# Patient Record
Sex: FEMALE | Race: BLACK OR AFRICAN AMERICAN | NOT HISPANIC OR LATINO | Employment: FULL TIME | URBAN - METROPOLITAN AREA
[De-identification: names, ages, dates, MRNs, and addresses within clinical notes are randomized per-mention and may not be internally consistent; named-entity substitution may affect disease eponyms.]

---

## 2017-02-09 DIAGNOSIS — I10 ESSENTIAL HYPERTENSION: ICD-10-CM

## 2017-02-09 RX ORDER — LISINOPRIL AND HYDROCHLOROTHIAZIDE 20; 25 MG/1; MG/1
TABLET ORAL
Qty: 90 TABLET | Refills: 0 | Status: SHIPPED | OUTPATIENT
Start: 2017-02-09 | End: 2017-05-20 | Stop reason: SDUPTHER

## 2017-03-21 DIAGNOSIS — Z30.41 ENCOUNTER FOR SURVEILLANCE OF CONTRACEPTIVE PILLS: ICD-10-CM

## 2017-03-21 RX ORDER — NORETHINDRONE ACETATE AND ETHINYL ESTRADIOL 1MG-20(21)
1 KIT ORAL DAILY
Qty: 28 TABLET | Refills: 3 | Status: SHIPPED | OUTPATIENT
Start: 2017-03-21 | End: 2017-07-27 | Stop reason: SINTOL

## 2017-03-21 NOTE — TELEPHONE ENCOUNTER
Patient is requesting a refill on birth control.  Last annual was on 04/21/2015.  Last seen on 06/21/2016.  Please advise

## 2017-03-21 NOTE — TELEPHONE ENCOUNTER
----- Message from Jojo Cline sent at 3/21/2017 11:14 AM CDT -----  Call pt at [phone]//regarding a rx refill for birth control call to [facility] on [address] @ [facility] [place]//[facility]

## 2017-03-21 NOTE — TELEPHONE ENCOUNTER
----- Message from Amrit Rousseau sent at 3/21/2017 11:22 AM CDT -----  Contact: Patient  Pt needs a return call @ ..[phone] (home) regarding a  refill on her birth control medication.  .. Please send to:    [id number] - [place] - [address] AT [facility]  [address] [place] [address]  Phone: [phone] Fax: [phone]

## 2017-05-20 DIAGNOSIS — I10 ESSENTIAL HYPERTENSION: ICD-10-CM

## 2017-05-22 RX ORDER — LISINOPRIL AND HYDROCHLOROTHIAZIDE 20; 25 MG/1; MG/1
TABLET ORAL
Qty: 90 TABLET | Refills: 0 | Status: SHIPPED | OUTPATIENT
Start: 2017-05-22 | End: 2017-08-15 | Stop reason: SDUPTHER

## 2017-07-27 ENCOUNTER — OFFICE VISIT (OUTPATIENT)
Dept: OBSTETRICS AND GYNECOLOGY | Facility: CLINIC | Age: 32
End: 2017-07-27
Payer: COMMERCIAL

## 2017-07-27 VITALS — WEIGHT: 231 LBS | HEIGHT: 65 IN | BODY MASS INDEX: 38.49 KG/M2

## 2017-07-27 DIAGNOSIS — Z30.41 ENCOUNTER FOR SURVEILLANCE OF CONTRACEPTIVE PILLS: ICD-10-CM

## 2017-07-27 DIAGNOSIS — B35.6 TINEA CRURIS: ICD-10-CM

## 2017-07-27 DIAGNOSIS — Z01.419 WELL WOMAN EXAM WITH ROUTINE GYNECOLOGICAL EXAM: Primary | ICD-10-CM

## 2017-07-27 PROCEDURE — 99999 PR PBB SHADOW E&M-EST. PATIENT-LVL II: CPT | Mod: PBBFAC,,, | Performed by: OBSTETRICS & GYNECOLOGY

## 2017-07-27 PROCEDURE — 99395 PREV VISIT EST AGE 18-39: CPT | Mod: S$GLB,,, | Performed by: OBSTETRICS & GYNECOLOGY

## 2017-07-27 RX ORDER — KETOCONAZOLE 20 MG/G
CREAM TOPICAL
Qty: 30 G | Refills: 1 | Status: SHIPPED | OUTPATIENT
Start: 2017-07-27 | End: 2018-11-02

## 2017-07-27 NOTE — PROGRESS NOTES
Subjective:       Patient ID: Malgorzata Yoder is a 32 y.o. female.    Chief Complaint:  Gynecologic Exam      History of Present Illness  Gynecologic Exam   The patient's pertinent negatives include no pelvic pain or vaginal discharge. Associated symptoms include rash. Pertinent negatives include no abdominal pain, back pain, constipation, diarrhea, fever, frequency, headaches, hematuria, nausea or vomiting. There is no history of menorrhagia.     Annual Exam-Premenopausal  Patient presents for annual exam. The patient complains of pruritic rash to bilateral breast folds. The patient is sexually active. GYN screening history: last pap: approximate date  and was normal. The patient wears seatbelts: yes. The patient participates in regular exercise: no. Has the patient ever been transfused or tattooed?: yes. The patient reports that there is not domestic violence in her life.  Pt would like to discuss switching to alternate OCP.  Pt stopped Microgestin use due to persistent breakthrough bleeding.        GYN & OB HistoryPatient's last menstrual period was 07/10/2017.   Date of Last Pap: 2016    OB History    Para Term  AB Living   1 1   1   1   SAB TAB Ectopic Multiple Live Births                  # Outcome Date GA Lbr Tre/2nd Weight Sex Delivery Anes PTL Lv   1  12   0.907 kg (2 lb) F CS-Unspec             Review of Systems  Review of Systems   Constitutional: Negative for activity change, appetite change, fatigue, fever and unexpected weight change.   Respiratory: Negative for shortness of breath.    Cardiovascular: Negative for chest pain, palpitations and leg swelling.   Gastrointestinal: Negative for abdominal pain, constipation, diarrhea, nausea and vomiting.   Genitourinary: Negative for dyspareunia, frequency, genital sores, hematuria, menorrhagia, menstrual problem, pelvic pain, vaginal bleeding, vaginal discharge, vaginal pain, dysmenorrhea and vaginal odor.    Musculoskeletal: Negative for back pain.   Skin:  Positive for rash.   Neurological: Negative for syncope and headaches.   Breast: Negative for breast mass, breast pain, nipple discharge and skin changes          Objective:    Physical Exam:   Constitutional: She is oriented to person, place, and time. She appears well-developed and well-nourished. No distress.    HENT:   Head: Normocephalic and atraumatic.    Eyes: EOM are normal. Pupils are equal, round, and reactive to light.    Neck: Normal range of motion. Neck supple.    Cardiovascular: Normal rate, regular rhythm and normal heart sounds.     Pulmonary/Chest: Effort normal and breath sounds normal. Right breast exhibits no inverted nipple, no mass, no nipple discharge, no skin change, no tenderness, no bleeding and no swelling. Left breast exhibits no inverted nipple, no mass, no nipple discharge, no skin change, no tenderness, no bleeding and no swelling. Breasts are symmetrical.            Abdominal: Soft. Bowel sounds are normal. She exhibits no distension. There is no tenderness.     Genitourinary: Vagina normal and uterus normal. Pelvic exam was performed with patient supine. There is no rash, tenderness, lesion or injury on the right labia. There is no rash, tenderness, lesion or injury on the left labia. Uterus is not deviated, not enlarged and not tender. Cervix is normal. Right adnexum displays no mass, no tenderness and no fullness. Left adnexum displays no mass, no tenderness and no fullness. No erythema, tenderness or bleeding in the vagina. No foreign body in the vagina. No signs of injury around the vagina. No vaginal discharge found. Cervix exhibits no motion tenderness, no discharge and no friability.           Musculoskeletal: Normal range of motion and moves all extremeties. She exhibits no edema or tenderness.       Neurological: She is alert and oriented to person, place, and time.    Skin: Skin is warm and dry.    Psychiatric: She has a  normal mood and affect. Her behavior is normal. Thought content normal.          Assessment:        1. Well woman exam with routine gynecological exam    2. Encounter for surveillance of contraceptive pills    3. Tinea cruris             Plan:      Well woman exam with routine gynecological exam  -     Pt was counseled on cervical/vaginal screening guidelines and recommendations.  Last pap NILM on 2016.  As per current ASCCP guidelines, next pap is due 2019.  -     Pt was advised on current breast cancer screening recommendations.  Pt requests to proceed with breast exam today.  -     Follow up with PCP for routine health maintenance needs.    Encounter for surveillance of contraceptive pills  -     norgestrel-ethinyl estradiol (LO/OVRAL) 0.3-30 mg-mcg per tablet; Take 1 tablet by mouth once daily.  Dispense: 28 tablet; Refill: 11  -     Medical history was reviewed.  Pt remains a candidate for OCP.  Will try higher dose OCP as Microgestin resulted in persistent breakthrough bleeding.      Tinea cruris  -     ketoconazole (NIZORAL) 2 % cream; Apply to affected area daily  Dispense: 30 g; Refill: 1      Return in about 1 year (around 7/27/2018).

## 2017-08-15 ENCOUNTER — OFFICE VISIT (OUTPATIENT)
Dept: INTERNAL MEDICINE | Facility: CLINIC | Age: 32
End: 2017-08-15
Payer: COMMERCIAL

## 2017-08-15 VITALS
SYSTOLIC BLOOD PRESSURE: 130 MMHG | BODY MASS INDEX: 38.6 KG/M2 | TEMPERATURE: 98 F | HEIGHT: 65 IN | DIASTOLIC BLOOD PRESSURE: 88 MMHG | HEART RATE: 64 BPM | OXYGEN SATURATION: 99 % | WEIGHT: 231.69 LBS

## 2017-08-15 DIAGNOSIS — G56.03 CARPAL TUNNEL SYNDROME ON BOTH SIDES: ICD-10-CM

## 2017-08-15 DIAGNOSIS — I10 ESSENTIAL HYPERTENSION: ICD-10-CM

## 2017-08-15 DIAGNOSIS — E66.01 SEVERE OBESITY (BMI 35.0-39.9): ICD-10-CM

## 2017-08-15 DIAGNOSIS — E87.6 HYPOKALEMIA: ICD-10-CM

## 2017-08-15 DIAGNOSIS — Z00.00 ROUTINE GENERAL MEDICAL EXAMINATION AT A HEALTH CARE FACILITY: Primary | ICD-10-CM

## 2017-08-15 PROCEDURE — 99999 PR PBB SHADOW E&M-EST. PATIENT-LVL III: CPT | Mod: PBBFAC,,, | Performed by: FAMILY MEDICINE

## 2017-08-15 PROCEDURE — 99395 PREV VISIT EST AGE 18-39: CPT | Mod: S$GLB,,, | Performed by: FAMILY MEDICINE

## 2017-08-15 RX ORDER — LISINOPRIL AND HYDROCHLOROTHIAZIDE 20; 25 MG/1; MG/1
1 TABLET ORAL DAILY
Qty: 90 TABLET | Refills: 3 | Status: SHIPPED | OUTPATIENT
Start: 2017-08-15 | End: 2018-10-31 | Stop reason: SDUPTHER

## 2017-08-15 NOTE — PROGRESS NOTES
"Subjective:       Patient ID: Malgorzata Yoder is a 32 y.o. female.    Chief Complaint: Annual Exam    32-year-old Afro-American female patient with Patient Active Problem List:     Hypertension     Hypokalemia     Severe obesity (BMI 35.0-39.9)  Here for routine annual physicals.  Patient reports that she's been taking her blood pressure medication regularly, I would like to know whether she needs to continue her potassium supplements has been out of potassium supplements lately.  Denies of any leg cramps, chest pain shortness of breath or palpitations.  Reports minimal fatigue  Has not been exercising lately  Patient reported that she had bilateral wrist pains off and on lately, in spite of having carpal tunnel surgery 2 years ago.  Patient reports that she lifts heavy stuff at work          Review of Systems   Constitutional: Positive for fatigue.   Eyes: Negative for visual disturbance.   Respiratory: Negative for shortness of breath.    Cardiovascular: Negative for chest pain and leg swelling.   Gastrointestinal: Negative for abdominal pain, nausea and vomiting.   Musculoskeletal: Positive for arthralgias. Negative for myalgias.   Skin: Negative for rash.   Neurological: Positive for numbness. Negative for weakness, light-headedness and headaches.   Psychiatric/Behavioral: Negative for sleep disturbance.         /88   Pulse 64   Temp 97.5 °F (36.4 °C) (Tympanic)   Ht 5' 5" (1.651 m)   Wt 105.1 kg (231 lb 11.3 oz)   LMP 07/10/2017   SpO2 99%   BMI 38.56 kg/m²   Objective:      Physical Exam   Constitutional: She is oriented to person, place, and time. She appears well-developed and well-nourished.   HENT:   Head: Normocephalic and atraumatic.   Mouth/Throat: Oropharynx is clear and moist.   Cardiovascular: Normal rate, regular rhythm and normal heart sounds.    No murmur heard.  Pulmonary/Chest: Effort normal and breath sounds normal. She has no wheezes.   Abdominal: Soft. Bowel sounds are " normal. There is no tenderness.   Musculoskeletal: She exhibits tenderness. She exhibits no edema.   Positive for bilateral wrist pain   Neurological: She is alert and oriented to person, place, and time.   Skin: Skin is warm and dry. No rash noted.   Psychiatric: She has a normal mood and affect.         Assessment:       1. Routine general medical examination at a health care facility    2. Essential hypertension    3. Hypokalemia    4. Severe obesity (BMI 35.0-39.9)    5. Carpal tunnel syndrome on both sides        Plan:   Routine general medical examination at a health care facility  -     CBC auto differential; Future; Expected date: 08/15/2017  -     Comprehensive metabolic panel; Future; Expected date: 08/15/2017  -     Lipid panel; Future; Expected date: 08/15/2017  -     TSH; Future; Expected date: 08/15/2017  -     Urinalysis; Future; Expected date: 08/15/2017  -     Vitamin D; Future; Expected date: 08/15/2017  -     Vitamin B12; Future; Expected date: 08/15/2017  Vital signs stable today.  Clinical exam stable.   Will check complete labs today non fasting  Advised to start Some modifications with low-fat and low-cholesterol diet and exercise 30 minutes daily  Up-to-date with screenings    Essential hypertension  -     Comprehensive metabolic panel; Future; Expected date: 08/15/2017  -     Lipid panel; Future; Expected date: 08/15/2017  -     lisinopril-hydrochlorothiazide (PRINZIDE,ZESTORETIC) 20-25 mg Tab; Take 1 tablet by mouth once daily.  Dispense: 90 tablet; Refill: 3  Blood pressure stable today, refill given on lisinopril hydrochlorothiazide 20/25 mg daily.   Advised to restrict salt intake and eat low-fat and low-cholesterol diet and exercise 30 minutes daily to lose weight with BMI 38    Hypokalemia - we will check BMP to check on potassium levels.     Severe obesity (BMI 35.0-39.9)-lifestyle modifications recommended as noted above    Carpal tunnel syndrome on both sides- status post bilateral  carpal tunnel surgery, advised to try using carpal tunnel brace, lifts heavy loads at work, if symptoms continue to persist discuss further with Dr. Silvina Mancera

## 2017-09-26 ENCOUNTER — LAB VISIT (OUTPATIENT)
Dept: LAB | Facility: HOSPITAL | Age: 32
End: 2017-09-26
Attending: OPHTHALMOLOGY
Payer: COMMERCIAL

## 2017-09-26 DIAGNOSIS — I10 ESSENTIAL HYPERTENSION: ICD-10-CM

## 2017-09-26 DIAGNOSIS — Z00.00 ROUTINE GENERAL MEDICAL EXAMINATION AT A HEALTH CARE FACILITY: ICD-10-CM

## 2017-09-26 LAB
25(OH)D3+25(OH)D2 SERPL-MCNC: 27 NG/ML
ALBUMIN SERPL BCP-MCNC: 3.5 G/DL
ALP SERPL-CCNC: 51 U/L
ALT SERPL W/O P-5'-P-CCNC: 21 U/L
ANION GAP SERPL CALC-SCNC: 9 MMOL/L
AST SERPL-CCNC: 23 U/L
BASOPHILS # BLD AUTO: 0.01 K/UL
BASOPHILS NFR BLD: 0.2 %
BILIRUB SERPL-MCNC: 0.4 MG/DL
BUN SERPL-MCNC: 8 MG/DL
CALCIUM SERPL-MCNC: 8.9 MG/DL
CHLORIDE SERPL-SCNC: 101 MMOL/L
CHOLEST SERPL-MCNC: 125 MG/DL
CHOLEST/HDLC SERPL: 3.1 {RATIO}
CO2 SERPL-SCNC: 28 MMOL/L
CREAT SERPL-MCNC: 1 MG/DL
DIFFERENTIAL METHOD: ABNORMAL
EOSINOPHIL # BLD AUTO: 0.1 K/UL
EOSINOPHIL NFR BLD: 1.8 %
ERYTHROCYTE [DISTWIDTH] IN BLOOD BY AUTOMATED COUNT: 13.6 %
EST. GFR  (AFRICAN AMERICAN): >60 ML/MIN/1.73 M^2
EST. GFR  (NON AFRICAN AMERICAN): >60 ML/MIN/1.73 M^2
GLUCOSE SERPL-MCNC: 87 MG/DL
HCT VFR BLD AUTO: 34.1 %
HDLC SERPL-MCNC: 40 MG/DL
HDLC SERPL: 32 %
HGB BLD-MCNC: 11.4 G/DL
LDLC SERPL CALC-MCNC: 71.4 MG/DL
LYMPHOCYTES # BLD AUTO: 2 K/UL
LYMPHOCYTES NFR BLD: 36.2 %
MCH RBC QN AUTO: 29.9 PG
MCHC RBC AUTO-ENTMCNC: 33.4 G/DL
MCV RBC AUTO: 90 FL
MONOCYTES # BLD AUTO: 0.5 K/UL
MONOCYTES NFR BLD: 8.4 %
NEUTROPHILS # BLD AUTO: 2.9 K/UL
NEUTROPHILS NFR BLD: 53.4 %
NONHDLC SERPL-MCNC: 85 MG/DL
PLATELET # BLD AUTO: 239 K/UL
PMV BLD AUTO: 12.1 FL
POTASSIUM SERPL-SCNC: 2.9 MMOL/L
PROT SERPL-MCNC: 7.6 G/DL
RBC # BLD AUTO: 3.81 M/UL
SODIUM SERPL-SCNC: 138 MMOL/L
TRIGL SERPL-MCNC: 68 MG/DL
TSH SERPL DL<=0.005 MIU/L-ACNC: 0.7 UIU/ML
VIT B12 SERPL-MCNC: 636 PG/ML
WBC # BLD AUTO: 5.47 K/UL

## 2017-09-26 PROCEDURE — 85025 COMPLETE CBC W/AUTO DIFF WBC: CPT

## 2017-09-26 PROCEDURE — 82306 VITAMIN D 25 HYDROXY: CPT

## 2017-09-26 PROCEDURE — 80061 LIPID PANEL: CPT

## 2017-09-26 PROCEDURE — 82607 VITAMIN B-12: CPT

## 2017-09-26 PROCEDURE — 80053 COMPREHEN METABOLIC PANEL: CPT

## 2017-09-26 PROCEDURE — 36415 COLL VENOUS BLD VENIPUNCTURE: CPT | Mod: PO

## 2017-09-26 PROCEDURE — 84443 ASSAY THYROID STIM HORMONE: CPT

## 2017-09-27 ENCOUNTER — TELEPHONE (OUTPATIENT)
Dept: INTERNAL MEDICINE | Facility: CLINIC | Age: 32
End: 2017-09-27

## 2017-09-27 DIAGNOSIS — E87.6 HYPOKALEMIA: Primary | ICD-10-CM

## 2017-09-27 RX ORDER — POTASSIUM CHLORIDE 1.5 G/1.58G
20 POWDER, FOR SOLUTION ORAL 2 TIMES DAILY
Qty: 60 PACKET | Refills: 0 | Status: SHIPPED | OUTPATIENT
Start: 2017-09-27 | End: 2018-11-05 | Stop reason: SDUPTHER

## 2017-09-27 NOTE — TELEPHONE ENCOUNTER
Extremely low potassium levels, will start on potassium supplements 20 mEq twice daily for a month.  Will plan to repeat potassium levels in 2 weeks.  If appropriate will drop it down to once a day later.  Urine showing some trace amount of blood but negative for infection.   Mild anemia noted on blood work, advised to start taking over-the-counter vitamin D3 2000 units daily for low vitamin D levels

## 2017-09-27 NOTE — TELEPHONE ENCOUNTER
Spoke to pt . Informed pt of results and recommendations and medication. Pt verbalized understanding

## 2017-09-28 ENCOUNTER — TELEPHONE (OUTPATIENT)
Dept: INTERNAL MEDICINE | Facility: CLINIC | Age: 32
End: 2017-09-28

## 2017-09-28 NOTE — TELEPHONE ENCOUNTER
----- Message from Marilee Lo sent at 9/28/2017 10:01 AM CDT -----  Please call [facility] at [phone] in regards to calling in edwin kumar in for pt.

## 2017-10-03 ENCOUNTER — OFFICE VISIT (OUTPATIENT)
Dept: INTERNAL MEDICINE | Facility: CLINIC | Age: 32
End: 2017-10-03
Payer: COMMERCIAL

## 2017-10-03 VITALS
DIASTOLIC BLOOD PRESSURE: 80 MMHG | HEIGHT: 65 IN | TEMPERATURE: 99 F | WEIGHT: 228.81 LBS | SYSTOLIC BLOOD PRESSURE: 124 MMHG | OXYGEN SATURATION: 99 % | HEART RATE: 76 BPM | BODY MASS INDEX: 38.12 KG/M2

## 2017-10-03 DIAGNOSIS — I10 ESSENTIAL HYPERTENSION: ICD-10-CM

## 2017-10-03 DIAGNOSIS — E87.6 HYPOKALEMIA: Primary | ICD-10-CM

## 2017-10-03 PROCEDURE — 99213 OFFICE O/P EST LOW 20 MIN: CPT | Mod: S$GLB,,, | Performed by: FAMILY MEDICINE

## 2017-10-03 PROCEDURE — 99999 PR PBB SHADOW E&M-EST. PATIENT-LVL III: CPT | Mod: PBBFAC,,, | Performed by: FAMILY MEDICINE

## 2017-10-03 NOTE — PROGRESS NOTES
"Subjective:       Patient ID: Malgorzata Yoder is a 32 y.o. female.    Chief Complaint: Follow-up    32-year-old Afro-American female patient with Patient Active Problem List:     Hypertension     Hypokalemia     Severe obesity (BMI 35.0-39.9)  Here for follow-up on her recent test results.  Patient reports that she just started taking potassium supplements since last night, has been taking only over the weekends, as she feels that the  potassium is making her to go  the bathroom more frequently, and patient is working on a project with close  Dead lines.   Denies of any leg cramps.  Reports fatigue.   Has been taking her blood pressure medication regularly  Denies of any other complaints today      Review of Systems   Constitutional: Positive for fatigue.   Eyes: Negative for visual disturbance.   Respiratory: Negative for shortness of breath.    Cardiovascular: Negative for chest pain, palpitations and leg swelling.   Gastrointestinal: Negative for abdominal pain, nausea and vomiting.   Musculoskeletal: Negative for arthralgias and myalgias.   Skin: Negative for rash.   Neurological: Negative for light-headedness and headaches.   Psychiatric/Behavioral: Negative for sleep disturbance.         /80 (BP Location: Right arm, Patient Position: Sitting)   Pulse 76   Temp 98.5 °F (36.9 °C) (Tympanic)   Ht 5' 5" (1.651 m)   Wt 103.8 kg (228 lb 13.4 oz)   LMP 09/12/2017 (Exact Date)   SpO2 99%   BMI 38.08 kg/m²   Objective:      Physical Exam   Constitutional: She is oriented to person, place, and time. She appears well-developed and well-nourished.   HENT:   Head: Normocephalic and atraumatic.   Mouth/Throat: Oropharynx is clear and moist.   Cardiovascular: Normal rate, regular rhythm and normal heart sounds.    No murmur heard.  Pulmonary/Chest: Effort normal and breath sounds normal. She has no wheezes.   Abdominal: Soft. Bowel sounds are normal. There is no tenderness.   Musculoskeletal: She exhibits " no edema or tenderness.   Neurological: She is alert and oriented to person, place, and time.   Skin: Skin is warm and dry. No rash noted.   Psychiatric: She has a normal mood and affect.       Lab Visit on 09/26/2017   Component Date Value Ref Range Status    WBC 09/26/2017 5.47  3.90 - 12.70 K/uL Final    RBC 09/26/2017 3.81* 4.00 - 5.40 M/uL Final    Hemoglobin 09/26/2017 11.4* 12.0 - 16.0 g/dL Final    Hematocrit 09/26/2017 34.1* 37.0 - 48.5 % Final    MCV 09/26/2017 90  82 - 98 fL Final    MCH 09/26/2017 29.9  27.0 - 31.0 pg Final    MCHC 09/26/2017 33.4  32.0 - 36.0 g/dL Final    RDW 09/26/2017 13.6  11.5 - 14.5 % Final    Platelets 09/26/2017 239  150 - 350 K/uL Final    MPV 09/26/2017 12.1  9.2 - 12.9 fL Final    Gran # 09/26/2017 2.9  1.8 - 7.7 K/uL Final    Lymph # 09/26/2017 2.0  1.0 - 4.8 K/uL Final    Mono # 09/26/2017 0.5  0.3 - 1.0 K/uL Final    Eos # 09/26/2017 0.1  0.0 - 0.5 K/uL Final    Baso # 09/26/2017 0.01  0.00 - 0.20 K/uL Final    Gran% 09/26/2017 53.4  38.0 - 73.0 % Final    Lymph% 09/26/2017 36.2  18.0 - 48.0 % Final    Mono% 09/26/2017 8.4  4.0 - 15.0 % Final    Eosinophil% 09/26/2017 1.8  0.0 - 8.0 % Final    Basophil% 09/26/2017 0.2  0.0 - 1.9 % Final    Differential Method 09/26/2017 Automated   Final    Sodium 09/26/2017 138  136 - 145 mmol/L Final    Potassium 09/26/2017 2.9* 3.5 - 5.1 mmol/L Final    Chloride 09/26/2017 101  95 - 110 mmol/L Final    CO2 09/26/2017 28  23 - 29 mmol/L Final    Glucose 09/26/2017 87  70 - 110 mg/dL Final    BUN, Bld 09/26/2017 8  6 - 20 mg/dL Final    Creatinine 09/26/2017 1.0  0.5 - 1.4 mg/dL Final    Calcium 09/26/2017 8.9  8.7 - 10.5 mg/dL Final    Total Protein 09/26/2017 7.6  6.0 - 8.4 g/dL Final    Albumin 09/26/2017 3.5  3.5 - 5.2 g/dL Final    Total Bilirubin 09/26/2017 0.4  0.1 - 1.0 mg/dL Final    Comment: For infants and newborns, interpretation of results should be based  on gestational age, weight and in  agreement with clinical  observations.  Premature Infant recommended reference ranges:  Up to 24 hours.............<8.0 mg/dL  Up to 48 hours............<12.0 mg/dL  3-5 days..................<15.0 mg/dL  6-29 days.................<15.0 mg/dL      Alkaline Phosphatase 09/26/2017 51* 55 - 135 U/L Final    AST 09/26/2017 23  10 - 40 U/L Final    ALT 09/26/2017 21  10 - 44 U/L Final    Anion Gap 09/26/2017 9  8 - 16 mmol/L Final    eGFR if African American 09/26/2017 >60.0  >60 mL/min/1.73 m^2 Final    eGFR if non African American 09/26/2017 >60.0  >60 mL/min/1.73 m^2 Final    Comment: Calculation used to obtain the estimated glomerular filtration  rate (eGFR) is the CKD-EPI equation. Since race is unknown   in our information system, the eGFR values for   -American and Non--American patients are given   for each creatinine result.      Cholesterol 09/26/2017 125  120 - 199 mg/dL Final    Comment: The National Cholesterol Education Program (NCEP) has set the  following guidelines (reference ranges) for Cholesterol:  Optimal.....................<200 mg/dL  Borderline High.............200-239 mg/dL  High........................> or = 240 mg/dL      Triglycerides 09/26/2017 68  30 - 150 mg/dL Final    Comment: The National Cholesterol Education Program (NCEP) has set the  following guidelines (reference values) for triglycerides:  Normal......................<150 mg/dL  Borderline High.............150-199 mg/dL  High........................200-499 mg/dL      HDL 09/26/2017 40  40 - 75 mg/dL Final    Comment: The National Cholesterol Education Program (NCEP) has set the  following guidelines (reference values) for HDL Cholesterol:  Low...............<40 mg/dL  Optimal...........>60 mg/dL      LDL Cholesterol 09/26/2017 71.4  63.0 - 159.0 mg/dL Final    Comment: The National Cholesterol Education Program (NCEP) has set the  following guidelines (reference values) for LDL  Cholesterol:  Optimal.......................<130 mg/dL  Borderline High...............130-159 mg/dL  High..........................160-189 mg/dL  Very High.....................>190 mg/dL      HDL/Chol Ratio 09/26/2017 32.0  20.0 - 50.0 % Final    Total Cholesterol/HDL Ratio 09/26/2017 3.1  2.0 - 5.0 Final    Non-HDL Cholesterol 09/26/2017 85  mg/dL Final    Comment: Risk category and Non-HDL cholesterol goals:  Coronary heart disease (CHD)or equivalent (10-year risk of CHD >20%):  Non-HDL cholesterol goal     <130 mg/dL  Two or more CHD risk factors and 10-year risk of CHD <= 20%:  Non-HDL cholesterol goal     <160 mg/dL  0 to 1 CHD risk factor:  Non-HDL cholesterol goal     <190 mg/dL      TSH 09/26/2017 0.698  0.400 - 4.000 uIU/mL Final    Vit D, 25-Hydroxy 09/26/2017 27* 30 - 96 ng/mL Final    Comment: Vitamin D deficiency.........<10 ng/mL                              Vitamin D insufficiency......10-29 ng/mL       Vitamin D sufficiency........> or equal to 30 ng/mL  Vitamin D toxicity............>100 ng/mL      Vitamin B-12 09/26/2017 636  210 - 950 pg/mL Final   Lab Visit on 09/26/2017   Component Date Value Ref Range Status    Specimen UA 09/26/2017 Urine, Clean Catch   Final    Color, UA 09/26/2017 Yellow  Yellow, Straw, [place] Final    Appearance, UA 09/26/2017 Clear  Clear Final    pH, UA 09/26/2017 7.0  5.0 - 8.0 Final    Specific Gravity, UA 09/26/2017 1.010  1.005 - 1.030 Final    Protein, UA 09/26/2017 Negative  Negative Final    Comment: Recommend a 24 hour urine protein or a urine   protein/creatinine ratio if globulin induced proteinuria is  clinically suspected.      Glucose, UA 09/26/2017 Negative  Negative Final    Ketones, UA 09/26/2017 Negative  Negative Final    Bilirubin (UA) 09/26/2017 Negative  Negative Final    Occult Blood UA 09/26/2017 1+* Negative Final    Nitrite, UA 09/26/2017 Negative  Negative Final    Leukocytes, UA 09/26/2017 Negative  Negative Final    RBC, UA  09/26/2017 10* 0 - 4 /hpf Final    WBC, UA 09/26/2017 5  0 - 5 /hpf Final    Microscopic Comment 09/26/2017 SEE COMMENT   Final    Comment: Other formed elements not mentioned in the report are not   present in the microscopic examination.          Assessment:       1. Hypokalemia    2. Essential hypertension        Plan:   Hypokalemia- patient was encouraged to start taking potassium supplements 20 mg twice daily as prescribed, and repeat potassium level on 10/11/17 as scheduled.   Information was given about potassium rich diet  Patient was advised that if her symptoms continue to persist or worsen should go to ER for possible IV potassium.  Patient verbalized understanding  Medication compliance discussed with patient today.    Essential hypertension-blood pressure stable today currently on lisinopril hydrochlorothiazide 20/25 mg daily    Reviewed labs available-  Urine showed trace amount of blood but likely secondary to menstrual cycles.  Positive for mild anemia, encouraged to eat iron and protein rich diet

## 2017-10-03 NOTE — PATIENT INSTRUCTIONS
Potassium-Rich Foods  The normal adult diet usually contains 2,000 mg to 4,000 mg of potassium per day. More potassium is needed when you lose too much potassium from your body. This can happen if you have diarrhea or vomiting. It can also happen if you take a medicine to make you urinate more (diuretic). To increase the amount of potassium in your diet, include these high-potassium foods.     [The (*) indicates foods highest in potassium.]  Vegetables  Artichokes. Cooked 1/2 cup, 200 mg to 300 mg*  Asparagus. Cooked 1/2 cup, 200 mg to 300 mg  Beans. White, red, [place]allero cooked 1/2 cup, 300 mg to 500 mg*  Beets. Cooked 1/2 cup, 200 mg to 300 mg  Broccoli. Cooked or raw 1 cup, 200 mg to 500 mg*  [place] sprouts. Cooked 1/2 cup, 200 mg to 300 mg  Cabbage. Raw 1 cup, 100 mg to 200 mg  Carrots. Raw or cooked 1/2 cup, 100 mg to 200 mg  Celery. Raw 1 cup, 200 mg to 300 mg  Lima beans. Fresh or frozen 1/2 cup, 300 mg to 500 mg*   Mushrooms. Raw or cooked 1/2 cup, 100 mg to 300 mg  Peas. Cooked 1/2 cup, 150 mg to 250 mg   Potatoes. Baked 1 medium, 500 mg to 900 mg*   Spinach. Cooked 1 cup, 800 mg to 900 mg*   Spinach. Raw 2 cups, 300 mg to 400 mg *  Squash, winter. Fresh, frozen, or cooked 1/2 cup, 200 mg to 400 mg   Tomato. Fresh 1 medium, 200 mg to 300 mg   Tomato juice. Canned 1/2 cup, 200 mg to 300 mg   Fruits  Apple juice. Unsweetened 1 cup, 200 mg to 300 mg   Apricots. Canned 1/2 cup, 200 mg to 300 mg   Apricots. Dried 4 pieces, 100 mg to 200 mg   Avocado. Raw 1/2 cup, 300 mg to 400 mg*  Banana. Fresh 1 small, 300 mg to 400 mg*   Cantaloupe. Fresh 1 cup diced, 300 mg to 400 mg*   Grape juice. Unsweetened 1 cup, 200 mg to 300 mg   Honeydew melon. Fresh 1 cup diced, 300 mg to 400 mg*   Orange. Fresh 1 medium, 200 mg to 300 mg    Orange juice. Unsweetened, fresh or frozen 1/2 cup, 200 mg to 300 mg  Pineapple juice. Unsweetened 1 cup, 300 mg to 400 mg   Prune juice. Unsweetened 1/2 cup, 300 mg to 400 mg*   Prunes. Dried 5  pieces, 300 mg to 400 mg*   Strawberries. Fresh or frozen 1 cup, 200 mg to 300 mg  Meat  Red meat. Cooked 3 ounces, 100 mg to 300 mg   Seafood  Cod, flounder, halibut. Cooked 3 ounces, 100 mg to 300 mg*  [place]. Cooked, 3 ounces 300 mg to 400 mg*   Scallops. Cooked 3 ounces, 200 mg to 300 mg*  Shrimp. Cooked 3/4 cup, 100 mg to 200 mg   Tuna. Fresh or canned 3/4 cup, 200 mg to 500 mg   Date Last Reviewed: 10/1/2016  © 9583-6100 [employer]. [address] [place] [address], [place] [address]. All rights reserved. This information is not intended as a substitute for professional medical care. Always follow your healthcare professional's instructions.

## 2017-10-05 ENCOUNTER — OFFICE VISIT (OUTPATIENT)
Dept: OBSTETRICS AND GYNECOLOGY | Facility: CLINIC | Age: 32
End: 2017-10-05
Payer: COMMERCIAL

## 2017-10-05 VITALS
BODY MASS INDEX: 37.73 KG/M2 | HEIGHT: 65 IN | WEIGHT: 226.44 LBS | SYSTOLIC BLOOD PRESSURE: 116 MMHG | DIASTOLIC BLOOD PRESSURE: 78 MMHG

## 2017-10-05 DIAGNOSIS — Z30.41 ENCOUNTER FOR SURVEILLANCE OF CONTRACEPTIVE PILLS: Primary | ICD-10-CM

## 2017-10-05 PROCEDURE — 99999 PR PBB SHADOW E&M-EST. PATIENT-LVL III: CPT | Mod: PBBFAC,,, | Performed by: OBSTETRICS & GYNECOLOGY

## 2017-10-05 PROCEDURE — 99212 OFFICE O/P EST SF 10 MIN: CPT | Mod: S$GLB,,, | Performed by: OBSTETRICS & GYNECOLOGY

## 2017-10-05 RX ORDER — NORGESTIMATE AND ETHINYL ESTRADIOL 7DAYSX3 28
1 KIT ORAL DAILY
Qty: 30 TABLET | Refills: 9 | Status: SHIPPED | OUTPATIENT
Start: 2017-10-05 | End: 2018-02-16 | Stop reason: SDUPTHER

## 2017-10-05 NOTE — PROGRESS NOTES
Subjective:       Patient ID: Malgorzata Yoder is a 32 y.o. female.    Chief Complaint:  Contraception      History of Present Illness  HPI  Pt would like to discuss switching contraceptives.  Current OCP has caused breakthough bleeding and pt would like a different option.  Otherwise doing well.    GYN & OB History  Patient's last menstrual period was 2017.   Date of Last Pap: 2016    OB History    Para Term  AB Living   1 1   1   1   SAB TAB Ectopic Multiple Live Births                  # Outcome Date GA Lbr Tre/2nd Weight Sex Delivery Anes PTL Lv   1  12   0.907 kg (2 lb) F CS-Unspec             Review of Systems  Review of Systems   Constitutional: Negative for activity change, appetite change, fatigue, fever and unexpected weight change.   Respiratory: Negative for shortness of breath.    Cardiovascular: Negative for chest pain.   Gastrointestinal: Negative for abdominal pain.   Genitourinary: Positive for menstrual problem. Negative for dyspareunia, menorrhagia, pelvic pain, vaginal bleeding, vaginal discharge, vaginal pain, dysmenorrhea and vaginal odor.   Musculoskeletal: Negative for back pain.   Neurological: Negative for syncope and headaches.           Objective:    Physical Exam:   Constitutional: She is oriented to person, place, and time. She appears well-developed and well-nourished. No distress.                           Neurological: She is alert and oriented to person, place, and time.     Psychiatric: She has a normal mood and affect. Her behavior is normal. Thought content normal.          Assessment:        1. Encounter for surveillance of contraceptive pills             Plan:      Encounter for surveillance of contraceptive pills  -     norgestimate-ethinyl estradiol (ORTHO TRI-CYCLEN,TRI-SPRINTEC) 0.18/0.215/0.25 mg-35 mcg (28) tablet; Take 1 tablet by mouth once daily.  Dispense: 30 tablet; Refill: 9  -     Pt was counseled on contraception  options, including associated risks and benefits of each.  Pt voiced understanding and desires to proceed with a higher dose OCP.  Medication dosing, side-effects, risks, benefits, and alternatives were discussed.  Medical history was reviewed and pt remains a candidate for OCP use.    Return in about 9 months (around 7/5/2018).

## 2017-10-11 ENCOUNTER — LAB VISIT (OUTPATIENT)
Dept: LAB | Facility: HOSPITAL | Age: 32
End: 2017-10-11
Attending: FAMILY MEDICINE
Payer: COMMERCIAL

## 2017-10-11 DIAGNOSIS — E87.6 HYPOKALEMIA: ICD-10-CM

## 2017-10-11 LAB — POTASSIUM SERPL-SCNC: 3.5 MMOL/L

## 2017-10-11 PROCEDURE — 84132 ASSAY OF SERUM POTASSIUM: CPT | Mod: PO

## 2017-10-11 PROCEDURE — 36415 COLL VENOUS BLD VENIPUNCTURE: CPT | Mod: PO

## 2018-02-16 ENCOUNTER — OFFICE VISIT (OUTPATIENT)
Dept: INTERNAL MEDICINE | Facility: CLINIC | Age: 33
End: 2018-02-16
Payer: COMMERCIAL

## 2018-02-16 ENCOUNTER — HOSPITAL ENCOUNTER (OUTPATIENT)
Dept: RADIOLOGY | Facility: HOSPITAL | Age: 33
Discharge: HOME | End: 2018-02-16
Attending: FAMILY MEDICINE
Payer: COMMERCIAL

## 2018-02-16 VITALS
HEIGHT: 65 IN | WEIGHT: 218.25 LBS | DIASTOLIC BLOOD PRESSURE: 84 MMHG | HEART RATE: 102 BPM | BODY MASS INDEX: 36.36 KG/M2 | OXYGEN SATURATION: 98 % | SYSTOLIC BLOOD PRESSURE: 126 MMHG | TEMPERATURE: 99 F

## 2018-02-16 DIAGNOSIS — I10 ESSENTIAL HYPERTENSION: ICD-10-CM

## 2018-02-16 DIAGNOSIS — G44.311 INTRACTABLE ACUTE POST-TRAUMATIC HEADACHE: Primary | ICD-10-CM

## 2018-02-16 DIAGNOSIS — E66.01 SEVERE OBESITY (BMI 35.0-39.9): ICD-10-CM

## 2018-02-16 DIAGNOSIS — G44.311 INTRACTABLE ACUTE POST-TRAUMATIC HEADACHE: ICD-10-CM

## 2018-02-16 PROCEDURE — 70150 X-RAY EXAM OF FACIAL BONES: CPT | Mod: 26,,, | Performed by: RADIOLOGY

## 2018-02-16 PROCEDURE — 99999 PR PBB SHADOW E&M-EST. PATIENT-LVL III: CPT | Mod: PBBFAC,,, | Performed by: FAMILY MEDICINE

## 2018-02-16 PROCEDURE — 99214 OFFICE O/P EST MOD 30 MIN: CPT | Mod: S$GLB,,, | Performed by: FAMILY MEDICINE

## 2018-02-16 PROCEDURE — 70150 X-RAY EXAM OF FACIAL BONES: CPT | Mod: TC,FY,PO

## 2018-02-16 PROCEDURE — 3008F BODY MASS INDEX DOCD: CPT | Mod: S$GLB,,, | Performed by: FAMILY MEDICINE

## 2018-02-16 RX ORDER — NORGESTIMATE AND ETHINYL ESTRADIOL 7DAYSX3 28
KIT ORAL
COMMUNITY
End: 2018-05-15 | Stop reason: SINTOL

## 2018-02-16 NOTE — PROGRESS NOTES
"Subjective:       Patient ID: Malgorzata Yoder is a 32 y.o. female.    Chief Complaint: Headache (case of asparagus fell on her head at work)    32-year-old Afro-American female patient with Patient Active Problem List:     Hypertension     Hypokalemia     Severe obesity (BMI 35.0-39.9)  Reported that at work patient had a case of asparagus fell from the top and hit her left forehead, causing bruising and superficial laceration, last Saturday, for which patient went to urgent care outside and was given naproxen, patient took just 1 day one tablet and has not been taking it currently.  Reports headache as 6/10, denies of any nausea vomiting, reported having minimal vision disturbances at the time of incident.   Denies of any chest pain or shortness of breath, taking her blood pressure medications regularly  Patient had tetanus booster on the day of the incident and she went to after-hours       Review of Systems   Constitutional: Negative for fatigue and fever.   HENT: Negative for congestion and postnasal drip.    Eyes: Negative for visual disturbance.   Respiratory: Negative for shortness of breath.    Cardiovascular: Negative for chest pain and leg swelling.   Gastrointestinal: Negative for abdominal pain, nausea and vomiting.   Musculoskeletal: Negative for myalgias.   Skin: Positive for wound. Negative for color change and rash.   Neurological: Positive for headaches. Negative for dizziness, syncope, light-headedness and numbness.   Psychiatric/Behavioral: Negative for sleep disturbance.         /84 (BP Location: Left arm, Patient Position: Sitting)   Pulse 102   Temp 98.8 °F (37.1 °C) (Tympanic)   Ht 5' 5" (1.651 m)   Wt 99 kg (218 lb 4.1 oz)   LMP 02/10/2018   SpO2 98%   BMI 36.32 kg/m²   Objective:      Physical Exam   Constitutional: She is oriented to person, place, and time. She appears well-developed and well-nourished.   HENT:   Head: Normocephalic and atraumatic.       Mouth/Throat: " Oropharynx is clear and moist.   Cardiovascular: Normal rate, regular rhythm and normal heart sounds.    No murmur heard.  Pulmonary/Chest: Effort normal and breath sounds normal. She has no wheezes.   Abdominal: Soft. Bowel sounds are normal. There is no tenderness.   Musculoskeletal: She exhibits tenderness. She exhibits no edema.   Positive for superficial laceration and tenderness noted to the left fore head just above the left eyebrow.    Neurological: She is alert and oriented to person, place, and time.   Skin: Skin is warm and dry. No rash noted. No erythema.   Psychiatric: She has a normal mood and affect.         Assessment:       1. Intractable acute post-traumatic headache    2. Essential hypertension    3. Severe obesity (BMI 35.0-39.9)        Plan:   Intractable acute post-traumatic headache  -     X-Ray Facial Bones  3 Or More View; Future; Expected date: 02/16/2018  Likely musculoskeletal causing superficial laceration due to trauma, from the case of asparagus falling on her forehead  Patient was advised to take naproxen as prescribed  Warm compresses recommended  Up-to-date with tetanus booster  Will get x-ray of the facial bones to look into any acute etiology causing ongoing headaches    Essential hypertension-blood pressure stable today currently on lisinopril hydrochlorothiazide 20/25 mg daily    Severe obesity (BMI 35.0-39.9)-lifestyle modifications recommended with diet and exercise to lose weight with BMI 36

## 2018-05-11 ENCOUNTER — TELEPHONE (OUTPATIENT)
Dept: OBSTETRICS AND GYNECOLOGY | Facility: CLINIC | Age: 33
End: 2018-05-11

## 2018-05-11 NOTE — TELEPHONE ENCOUNTER
Patient came to the clinic, she was requesting a refill on her birth control however would like a different medication called in to the pharmacy.  I informed the patient that she would need to see the doctor to discuss other options if she wished to switch the method of birth control.  She voiced understanding and scheduled an appointment for 05/15 at 11:45am.

## 2018-05-11 NOTE — TELEPHONE ENCOUNTER
----- Message from Tiffany Bowens sent at 5/11/2018  9:49 AM CDT -----  Contact: [phone]  Pt would like to consult with the  Nurse about Birth Control.  Please call back at [phone].  x-

## 2018-05-15 ENCOUNTER — OFFICE VISIT (OUTPATIENT)
Dept: OBSTETRICS AND GYNECOLOGY | Facility: CLINIC | Age: 33
End: 2018-05-15
Payer: COMMERCIAL

## 2018-05-15 VITALS
SYSTOLIC BLOOD PRESSURE: 144 MMHG | BODY MASS INDEX: 36.8 KG/M2 | HEIGHT: 65 IN | WEIGHT: 220.88 LBS | DIASTOLIC BLOOD PRESSURE: 90 MMHG

## 2018-05-15 DIAGNOSIS — Z30.41 ENCOUNTER FOR SURVEILLANCE OF CONTRACEPTIVE PILLS: Primary | ICD-10-CM

## 2018-05-15 PROCEDURE — 3008F BODY MASS INDEX DOCD: CPT | Mod: CPTII,S$GLB,, | Performed by: OBSTETRICS & GYNECOLOGY

## 2018-05-15 PROCEDURE — 3080F DIAST BP >= 90 MM HG: CPT | Mod: CPTII,S$GLB,, | Performed by: OBSTETRICS & GYNECOLOGY

## 2018-05-15 PROCEDURE — 99999 PR PBB SHADOW E&M-EST. PATIENT-LVL III: CPT | Mod: PBBFAC,,, | Performed by: OBSTETRICS & GYNECOLOGY

## 2018-05-15 PROCEDURE — 81025 URINE PREGNANCY TEST: CPT | Mod: S$GLB,,, | Performed by: OBSTETRICS & GYNECOLOGY

## 2018-05-15 PROCEDURE — 3077F SYST BP >= 140 MM HG: CPT | Mod: CPTII,S$GLB,, | Performed by: OBSTETRICS & GYNECOLOGY

## 2018-05-15 PROCEDURE — 99212 OFFICE O/P EST SF 10 MIN: CPT | Mod: S$GLB,,, | Performed by: OBSTETRICS & GYNECOLOGY

## 2018-05-15 NOTE — PROGRESS NOTES
Subjective:       Patient ID: Malgorzata Yoder is a 33 y.o. female.    Chief Complaint:  Contraception (discuss options )      History of Present Illness  HPI  Pt reports that has continued to have issues with daily bleeding on her current OCP.  Would like to discuss options, but prefers another OCP.      GYN & OB History  Patient's last menstrual period was 2018.   Date of Last Pap: 2016    OB History    Para Term  AB Living   1 1   1   1   SAB TAB Ectopic Multiple Live Births                  # Outcome Date GA Lbr Tre/2nd Weight Sex Delivery Anes PTL Lv   1  12 36w0d  0.907 kg (2 lb) F CS-Unspec             Review of Systems  Review of Systems   Constitutional: Negative for activity change, appetite change, fatigue, fever and unexpected weight change.   Respiratory: Negative for shortness of breath.    Cardiovascular: Negative for chest pain.   Gastrointestinal: Negative for abdominal pain.   Genitourinary: Positive for menstrual problem and vaginal bleeding. Negative for menorrhagia, pelvic pain, vaginal discharge, vaginal pain and vaginal odor.   Neurological: Negative for syncope and headaches.           Objective:    Physical Exam:   Constitutional: She is oriented to person, place, and time. She appears well-developed and well-nourished. No distress.                           Neurological: She is alert and oriented to person, place, and time.     Psychiatric: She has a normal mood and affect. Her behavior is normal. Thought content normal.          Assessment:        1. Encounter for surveillance of contraceptive pills             Plan:      Encounter for surveillance of contraceptive pills  -     POCT urine pregnancy  -     norethindrone-ethinyl estradiol (NECON) 0.5-35 mg-mcg per tablet; Take 1 tablet by mouth once daily.  Dispense: 28 tablet; Refill: 3  -     Pt was counseled on contraception options, including associated risks and benefits of each.  Pt voiced  understanding and desires to proceed with another OCP.  Medication dosing, side-effects, risks, benefits, and alternatives were discussed.  Medical history was reviewed and pt remains a candidate for OCP use.      Follow-up in about 3 months (around 8/15/2018) for Annual exam.

## 2018-07-31 ENCOUNTER — OFFICE VISIT (OUTPATIENT)
Dept: OBSTETRICS AND GYNECOLOGY | Facility: CLINIC | Age: 33
End: 2018-07-31
Payer: COMMERCIAL

## 2018-07-31 VITALS
WEIGHT: 228.38 LBS | BODY MASS INDEX: 38.01 KG/M2 | SYSTOLIC BLOOD PRESSURE: 122 MMHG | DIASTOLIC BLOOD PRESSURE: 70 MMHG

## 2018-07-31 DIAGNOSIS — Z11.3 SCREEN FOR STD (SEXUALLY TRANSMITTED DISEASE): ICD-10-CM

## 2018-07-31 DIAGNOSIS — Z30.8 ENCOUNTER FOR OTHER CONTRACEPTIVE MANAGEMENT: Primary | ICD-10-CM

## 2018-07-31 PROCEDURE — 3078F DIAST BP <80 MM HG: CPT | Mod: CPTII,S$GLB,, | Performed by: OBSTETRICS & GYNECOLOGY

## 2018-07-31 PROCEDURE — 87491 CHLMYD TRACH DNA AMP PROBE: CPT

## 2018-07-31 PROCEDURE — 99999 PR PBB SHADOW E&M-EST. PATIENT-LVL II: CPT | Mod: PBBFAC,,, | Performed by: OBSTETRICS & GYNECOLOGY

## 2018-07-31 PROCEDURE — 3074F SYST BP LT 130 MM HG: CPT | Mod: CPTII,S$GLB,, | Performed by: OBSTETRICS & GYNECOLOGY

## 2018-07-31 PROCEDURE — 99395 PREV VISIT EST AGE 18-39: CPT | Mod: S$GLB,,, | Performed by: OBSTETRICS & GYNECOLOGY

## 2018-07-31 NOTE — PROGRESS NOTES
Subjective:       Patient ID: Malgorzata Yoder is a 33 y.o. female.    Chief Complaint:  Well Woman and STD CHECK      History of Present Illness  HPI  Annual Exam-Premenopausal  Patient presents for annual exam. The patient has no complaints today. The patient is sexually active. GYN screening history: last pap: approximate date  and was normal. The patient wears seatbelts: yes. The patient participates in regular exercise: no. Has the patient ever been transfused or tattooed?: yes. The patient reports that there is not domestic violence in her life.  Menses are regular.  Denies excessive bleeding or cramping.  Discontinued OCP use due to breakthrough bleeding.  Desires to discuss options.  Requests Gc/Ct screening.  Denies known exposure.        GYN & OB History  Patient's last menstrual period was 2018 (exact date).   Date of Last Pap: 2016    OB History    Para Term  AB Living   1 1   1   1   SAB TAB Ectopic Multiple Live Births                  # Outcome Date GA Lbr Tre/2nd Weight Sex Delivery Anes PTL Lv   1  12 36w0d  0.907 kg (2 lb) F CS-Unspec             Review of Systems  Review of Systems   Constitutional: Negative for activity change, appetite change, fatigue, fever and unexpected weight change.   Respiratory: Negative for shortness of breath.    Cardiovascular: Negative for chest pain, palpitations and leg swelling.   Gastrointestinal: Negative for abdominal pain, bloating, blood in stool, constipation, diarrhea, nausea and vomiting.   Genitourinary: Negative for dyspareunia, dysuria, flank pain, frequency, genital sores, hematuria, menorrhagia, menstrual problem, pelvic pain, urgency, vaginal bleeding, vaginal discharge, vaginal pain, dysmenorrhea, urinary incontinence and vaginal odor.   Musculoskeletal: Negative for back pain.   Neurological: Negative for syncope and headaches.   Breast: Negative for breast mass, breast pain, nipple discharge and  skin changes          Objective:    Physical Exam:   Constitutional: She is oriented to person, place, and time. She appears well-developed and well-nourished. No distress.    HENT:   Head: Normocephalic and atraumatic.    Eyes: EOM are normal. Pupils are equal, round, and reactive to light.    Neck: Normal range of motion. Neck supple.    Cardiovascular: Normal rate, regular rhythm and normal heart sounds.     Pulmonary/Chest: Effort normal and breath sounds normal. Right breast exhibits no inverted nipple, no mass, no nipple discharge, no skin change, no tenderness, no bleeding and no swelling. Left breast exhibits no inverted nipple, no mass, no nipple discharge, no skin change, no tenderness, no bleeding and no swelling. Breasts are symmetrical.        Abdominal: Soft. Bowel sounds are normal. She exhibits no distension. There is no tenderness.     Genitourinary: Vagina normal and uterus normal. Pelvic exam was performed with patient supine. There is no rash, tenderness, lesion or injury on the right labia. There is no rash, tenderness, lesion or injury on the left labia. Uterus is not deviated, not enlarged and not tender. Cervix is normal. Right adnexum displays no mass, no tenderness and no fullness. Left adnexum displays no mass, no tenderness and no fullness. No erythema, tenderness or bleeding in the vagina. No foreign body in the vagina. No signs of injury around the vagina. No vaginal discharge found. Cervix exhibits no motion tenderness, no discharge and no friability.           Musculoskeletal: Normal range of motion and moves all extremeties. She exhibits no edema or tenderness.       Neurological: She is alert and oriented to person, place, and time.    Skin: Skin is warm and dry.    Psychiatric: She has a normal mood and affect. Her behavior is normal. Thought content normal.          Assessment:        1. Encounter for other contraceptive management    2. Screen for STD (sexually transmitted  disease)             Plan:      Encounter for other contraceptive management  -     levonorgestrel (MIRENA) 20 mcg/24 hr (5 years) IUD; 1 Intra Uterine Device by Intrauterine route once. for 1 dose  Dispense: 1 each; Refill: 0  -     Pt was counseled on cervical/vaginal screening guidelines and recommendations.  Last pap NILM on 2016.  As per current ASCCP guidelines, next pap is due 2019.  -     Pt was advised on current breast cancer screening recommendations.  Pt desires to proceed with breast exam today.  -     Follow up with PCP for routine health maintenance needs.  -     Pt was counseled on contraception options, including associated risks and benefits of each.  Pt voiced understanding and desires to proceed with Mirena.  Medication dosing, side-effects, risks, benefits, and alternatives were discussed.  Medical history was reviewed and pt is a candidate for Mirena use.    Screen for STD (sexually transmitted disease)  -     C. trachomatis/N. gonorrhoeae by AMP DNA      Follow-up in about 1 year (around 7/31/2019).

## 2018-08-01 LAB
C TRACH DNA SPEC QL NAA+PROBE: NOT DETECTED
N GONORRHOEA DNA SPEC QL NAA+PROBE: NOT DETECTED

## 2018-08-08 ENCOUNTER — TELEPHONE (OUTPATIENT)
Dept: PHARMACY | Facility: CLINIC | Age: 33
End: 2018-08-08

## 2018-08-15 NOTE — TELEPHONE ENCOUNTER
Pt notified that she has a $0.00 copay for Mirena on her pharmacy benefit but she may have a copay for her office visit. She declined pharmacist consultation.Will confirm delivery with [facility] staff.

## 2018-08-24 ENCOUNTER — TELEPHONE (OUTPATIENT)
Dept: OBSTETRICS AND GYNECOLOGY | Facility: CLINIC | Age: 33
End: 2018-08-24

## 2018-08-24 NOTE — TELEPHONE ENCOUNTER
pts mirena received at the [facility] location placed in the med room. Spoke with pt and notified her that we received her device. Scheduled insertion appt per pt request.

## 2018-10-31 ENCOUNTER — OFFICE VISIT (OUTPATIENT)
Dept: INTERNAL MEDICINE | Facility: CLINIC | Age: 33
End: 2018-10-31
Payer: COMMERCIAL

## 2018-10-31 VITALS
HEIGHT: 65 IN | TEMPERATURE: 99 F | SYSTOLIC BLOOD PRESSURE: 138 MMHG | BODY MASS INDEX: 38.45 KG/M2 | WEIGHT: 230.81 LBS | DIASTOLIC BLOOD PRESSURE: 88 MMHG | HEART RATE: 80 BPM | OXYGEN SATURATION: 98 %

## 2018-10-31 DIAGNOSIS — E87.6 HYPOKALEMIA: ICD-10-CM

## 2018-10-31 DIAGNOSIS — E55.9 VITAMIN D DEFICIENCY: ICD-10-CM

## 2018-10-31 DIAGNOSIS — E66.01 SEVERE OBESITY WITH BODY MASS INDEX (BMI) OF 35.0 TO 39.9 WITH SERIOUS COMORBIDITY: ICD-10-CM

## 2018-10-31 DIAGNOSIS — Z11.3 SCREEN FOR STD (SEXUALLY TRANSMITTED DISEASE): ICD-10-CM

## 2018-10-31 DIAGNOSIS — I10 ESSENTIAL HYPERTENSION: ICD-10-CM

## 2018-10-31 DIAGNOSIS — Z00.00 ROUTINE GENERAL MEDICAL EXAMINATION AT A HEALTH CARE FACILITY: Primary | ICD-10-CM

## 2018-10-31 PROCEDURE — 99999 PR PBB SHADOW E&M-EST. PATIENT-LVL III: CPT | Mod: PBBFAC,,, | Performed by: FAMILY MEDICINE

## 2018-10-31 PROCEDURE — 3075F SYST BP GE 130 - 139MM HG: CPT | Mod: CPTII,S$GLB,, | Performed by: FAMILY MEDICINE

## 2018-10-31 PROCEDURE — 3079F DIAST BP 80-89 MM HG: CPT | Mod: CPTII,S$GLB,, | Performed by: FAMILY MEDICINE

## 2018-10-31 PROCEDURE — 99395 PREV VISIT EST AGE 18-39: CPT | Mod: S$GLB,,, | Performed by: FAMILY MEDICINE

## 2018-10-31 RX ORDER — LISINOPRIL AND HYDROCHLOROTHIAZIDE 20; 25 MG/1; MG/1
TABLET ORAL
COMMUNITY
End: 2019-01-10 | Stop reason: SDUPTHER

## 2018-10-31 NOTE — PROGRESS NOTES
"Subjective:       Patient ID: Malgorzata Yoder is a 33 y.o. female.    Chief Complaint: Follow-up and Medication Refill    33-year-old  female patient with Patient Active Problem List:     Hypertension     Hypokalemia     Severe obesity with body mass index (BMI) of 35.0 to 39.9 with serious comorbidity  Here for routine annual physicals and reports that she has been taking her blood pressure medication regularly and takes potassium supplements once daily  Patient reports that she occasionally gets leg cramps and feet cramps especially at work when she lifts heavy things   Patient reports that her boyfriend has been diagnosed with yeast infection would like to get STD screening  Patient made appointment with gynecologist to discuss further in 2 days  Would like to get workup done while getting her labs  Denies any vaginal discharge or discomfort with urination at this time  Currently not using birth control pills  Denies any chest pain or difficulty breathing or abdominal discomfort, nausea vomiting      Review of Systems   Constitutional: Negative for fatigue.   Eyes: Negative for visual disturbance.   Respiratory: Negative for shortness of breath.    Cardiovascular: Negative for chest pain and leg swelling.   Gastrointestinal: Negative for abdominal pain, nausea and vomiting.   Musculoskeletal: Positive for myalgias.   Skin: Negative for rash.   Neurological: Negative for light-headedness and headaches.   Psychiatric/Behavioral: Negative for sleep disturbance.         /88 (BP Location: Right arm, Patient Position: Sitting)   Pulse 80   Temp 98.9 °F (37.2 °C) (Tympanic)   Ht 5' 5" (1.651 m)   Wt 104.7 kg (230 lb 13.2 oz)   LMP 10/25/2018 (Exact Date)   SpO2 98%   BMI 38.41 kg/m²   Objective:      Physical Exam   Constitutional: She is oriented to person, place, and time. She appears well-developed and well-nourished.   HENT:   Head: Normocephalic and atraumatic.   Mouth/Throat: " Oropharynx is clear and moist.   Cardiovascular: Normal rate, regular rhythm and normal heart sounds.   No murmur heard.  Pulmonary/Chest: Effort normal and breath sounds normal. She has no wheezes.   Abdominal: Soft. Bowel sounds are normal. There is no tenderness.   Musculoskeletal: She exhibits no edema.   Neurological: She is alert and oriented to person, place, and time.   Skin: Skin is warm and dry. No rash noted.   Psychiatric: She has a normal mood and affect.         Assessment:       1. Routine general medical examination at a health care facility    2. Essential hypertension    3. Hypokalemia    4. Vitamin D deficiency    5. Screen for STD (sexually transmitted disease)    6. Severe obesity with body mass index (BMI) of 35.0 to 39.9 with serious comorbidity        Plan:   Routine general medical examination at a health care facility  -     CBC auto differential; Future; Expected date: 10/31/2018  -     Comprehensive metabolic panel; Future; Expected date: 10/31/2018  -     Lipid panel; Future; Expected date: 10/31/2018  -     TSH; Future; Expected date: 10/31/2018  -     Vitamin D; Future; Expected date: 10/31/2018  -     Urinalysis; Future; Expected date: 10/31/2018  Vital signs stable today.  Clinical exam stable.  Encouraged to start lifestyle modifications with low-fat and low-cholesterol diet and exercise 30 min daily  Will check fasting labs on Friday  Refuses flu shot    Essential hypertension-initial blood pressure was elevated but repeat blood pressure by MD was 138/88, patient was encouraged to continue monitoring blood pressure trends  Currently taking lisinopril hydrochlorothiazide 20/25 mg daily    Hypokalemia-currently taking potassium supplements 20 mg daily    Vitamin D deficiency-recheck vitamin-D levels as it was minimally low in the past    Screen for STD (sexually transmitted disease)  -     C. trachomatis/N. gonorrhoeae by AMP DNA; Future; Expected date: 10/31/2018  -     HIV-1 and  HIV-2 antibodies; Future; Expected date: 10/31/2018  -     RPR; Future; Expected date: 10/31/2018  -     Hepatitis panel, acute; Future; Expected date: 10/31/2018  Patient clinically asymptomatic    Severe obesity with body mass index (BMI) of 35.0 to 39.9 with serious comorbidity  Encouraged to work on lifestyle modifications with low-fat and low-cholesterol diet and exercise 30 min daily to lose weight with BMI 38

## 2018-11-02 ENCOUNTER — LAB VISIT (OUTPATIENT)
Dept: LAB | Facility: HOSPITAL | Age: 33
End: 2018-11-02
Attending: FAMILY MEDICINE
Payer: COMMERCIAL

## 2018-11-02 ENCOUNTER — OFFICE VISIT (OUTPATIENT)
Dept: OBSTETRICS AND GYNECOLOGY | Facility: CLINIC | Age: 33
End: 2018-11-02
Payer: COMMERCIAL

## 2018-11-02 VITALS
WEIGHT: 230.81 LBS | HEIGHT: 65 IN | BODY MASS INDEX: 38.45 KG/M2 | SYSTOLIC BLOOD PRESSURE: 126 MMHG | DIASTOLIC BLOOD PRESSURE: 82 MMHG

## 2018-11-02 DIAGNOSIS — N89.8 VAGINAL DISCHARGE: Primary | ICD-10-CM

## 2018-11-02 DIAGNOSIS — Z11.3 SCREEN FOR STD (SEXUALLY TRANSMITTED DISEASE): ICD-10-CM

## 2018-11-02 DIAGNOSIS — R82.90 ABNORMAL URINE FINDING: Primary | ICD-10-CM

## 2018-11-02 DIAGNOSIS — Z00.00 ROUTINE GENERAL MEDICAL EXAMINATION AT A HEALTH CARE FACILITY: ICD-10-CM

## 2018-11-02 LAB
25(OH)D3+25(OH)D2 SERPL-MCNC: 18 NG/ML
ALBUMIN SERPL BCP-MCNC: 3.5 G/DL
ALP SERPL-CCNC: 62 U/L
ALT SERPL W/O P-5'-P-CCNC: 19 U/L
ANION GAP SERPL CALC-SCNC: 9 MMOL/L
AST SERPL-CCNC: 15 U/L
BACTERIA #/AREA URNS HPF: ABNORMAL /HPF
BASOPHILS # BLD AUTO: 0.03 K/UL
BASOPHILS NFR BLD: 0.5 %
BILIRUB SERPL-MCNC: 0.3 MG/DL
BILIRUB UR QL STRIP: NEGATIVE
BUN SERPL-MCNC: 9 MG/DL
CALCIUM SERPL-MCNC: 9.4 MG/DL
CHLORIDE SERPL-SCNC: 102 MMOL/L
CHOLEST SERPL-MCNC: 173 MG/DL
CHOLEST/HDLC SERPL: 3.5 {RATIO}
CLARITY UR: ABNORMAL
CO2 SERPL-SCNC: 27 MMOL/L
COLOR UR: YELLOW
CREAT SERPL-MCNC: 0.9 MG/DL
DIFFERENTIAL METHOD: NORMAL
EOSINOPHIL # BLD AUTO: 0.2 K/UL
EOSINOPHIL NFR BLD: 2.8 %
ERYTHROCYTE [DISTWIDTH] IN BLOOD BY AUTOMATED COUNT: 13 %
EST. GFR  (AFRICAN AMERICAN): >60 ML/MIN/1.73 M^2
EST. GFR  (NON AFRICAN AMERICAN): >60 ML/MIN/1.73 M^2
GLUCOSE SERPL-MCNC: 90 MG/DL
GLUCOSE UR QL STRIP: NEGATIVE
HCT VFR BLD AUTO: 39 %
HDLC SERPL-MCNC: 49 MG/DL
HDLC SERPL: 28.3 %
HGB BLD-MCNC: 12.9 G/DL
HGB UR QL STRIP: ABNORMAL
IMM GRANULOCYTES # BLD AUTO: 0.03 K/UL
IMM GRANULOCYTES NFR BLD AUTO: 0.5 %
KETONES UR QL STRIP: NEGATIVE
LDLC SERPL CALC-MCNC: 105.4 MG/DL
LEUKOCYTE ESTERASE UR QL STRIP: ABNORMAL
LYMPHOCYTES # BLD AUTO: 1.9 K/UL
LYMPHOCYTES NFR BLD: 33.5 %
MCH RBC QN AUTO: 30 PG
MCHC RBC AUTO-ENTMCNC: 33.1 G/DL
MCV RBC AUTO: 91 FL
MICROSCOPIC COMMENT: ABNORMAL
MONOCYTES # BLD AUTO: 0.5 K/UL
MONOCYTES NFR BLD: 8 %
NEUTROPHILS # BLD AUTO: 3.2 K/UL
NEUTROPHILS NFR BLD: 54.7 %
NITRITE UR QL STRIP: NEGATIVE
NONHDLC SERPL-MCNC: 124 MG/DL
NRBC BLD-RTO: 0 /100 WBC
PH UR STRIP: 7 [PH] (ref 5–8)
PLATELET # BLD AUTO: 231 K/UL
PMV BLD AUTO: 11.6 FL
POTASSIUM SERPL-SCNC: 3.4 MMOL/L
PROT SERPL-MCNC: 7.7 G/DL
PROT UR QL STRIP: NEGATIVE
RBC # BLD AUTO: 4.3 M/UL
RBC #/AREA URNS HPF: 3 /HPF (ref 0–4)
SODIUM SERPL-SCNC: 138 MMOL/L
SP GR UR STRIP: <=1.005 (ref 1–1.03)
SQUAMOUS #/AREA URNS HPF: 3 /HPF
TRIGL SERPL-MCNC: 93 MG/DL
TSH SERPL DL<=0.005 MIU/L-ACNC: 0.98 UIU/ML
URN SPEC COLLECT METH UR: ABNORMAL
WBC # BLD AUTO: 5.76 K/UL
WBC #/AREA URNS HPF: 10 /HPF (ref 0–5)

## 2018-11-02 PROCEDURE — 84443 ASSAY THYROID STIM HORMONE: CPT

## 2018-11-02 PROCEDURE — 86703 HIV-1/HIV-2 1 RESULT ANTBDY: CPT

## 2018-11-02 PROCEDURE — 87088 URINE BACTERIA CULTURE: CPT

## 2018-11-02 PROCEDURE — 80053 COMPREHEN METABOLIC PANEL: CPT

## 2018-11-02 PROCEDURE — 3008F BODY MASS INDEX DOCD: CPT | Mod: CPTII,S$GLB,, | Performed by: OBSTETRICS & GYNECOLOGY

## 2018-11-02 PROCEDURE — 85025 COMPLETE CBC W/AUTO DIFF WBC: CPT

## 2018-11-02 PROCEDURE — 87591 N.GONORRHOEAE DNA AMP PROB: CPT

## 2018-11-02 PROCEDURE — 80074 ACUTE HEPATITIS PANEL: CPT

## 2018-11-02 PROCEDURE — 87147 CULTURE TYPE IMMUNOLOGIC: CPT

## 2018-11-02 PROCEDURE — 87210 SMEAR WET MOUNT SALINE/INK: CPT | Mod: QW,S$GLB,, | Performed by: OBSTETRICS & GYNECOLOGY

## 2018-11-02 PROCEDURE — 99213 OFFICE O/P EST LOW 20 MIN: CPT | Mod: S$GLB,,, | Performed by: OBSTETRICS & GYNECOLOGY

## 2018-11-02 PROCEDURE — 80061 LIPID PANEL: CPT

## 2018-11-02 PROCEDURE — 3079F DIAST BP 80-89 MM HG: CPT | Mod: CPTII,S$GLB,, | Performed by: OBSTETRICS & GYNECOLOGY

## 2018-11-02 PROCEDURE — 82306 VITAMIN D 25 HYDROXY: CPT

## 2018-11-02 PROCEDURE — 81000 URINALYSIS NONAUTO W/SCOPE: CPT | Mod: PO

## 2018-11-02 PROCEDURE — 87086 URINE CULTURE/COLONY COUNT: CPT

## 2018-11-02 PROCEDURE — 86592 SYPHILIS TEST NON-TREP QUAL: CPT

## 2018-11-02 PROCEDURE — 36415 COLL VENOUS BLD VENIPUNCTURE: CPT | Mod: PO

## 2018-11-02 PROCEDURE — 99999 PR PBB SHADOW E&M-EST. PATIENT-LVL II: CPT | Mod: PBBFAC,,, | Performed by: OBSTETRICS & GYNECOLOGY

## 2018-11-02 PROCEDURE — 3074F SYST BP LT 130 MM HG: CPT | Mod: CPTII,S$GLB,, | Performed by: OBSTETRICS & GYNECOLOGY

## 2018-11-02 NOTE — PROGRESS NOTES
"  Subjective:       Patient ID: Malgorzata Yoder is a 33 y.o. female.    Chief Complaint:  Vaginal Discharge      History of Present Illness  HPI  Pt made appointment for evaluation of discharge.  However, pt denies having any abnormal discharge.  She does report that her partner currently has a "yeast rash" on his genital area and requests to be evaluated to make sure she doesn't have a yeast infection.  Reports no symptoms.  Had Gc/Ct test ordered by PCP this AM.    GYN & OB History  Patient's last menstrual period was 10/25/2018 (exact date).   Date of Last Pap: 2016    OB History    Para Term  AB Living   1 1   1   1   SAB TAB Ectopic Multiple Live Births                  # Outcome Date GA Lbr Tre/2nd Weight Sex Delivery Anes PTL Lv   1  12 36w0d  0.907 kg (2 lb) F CS-Unspec             Review of Systems  Review of Systems   Constitutional: Negative for activity change, appetite change, fatigue, fever and unexpected weight change.   Respiratory: Negative for shortness of breath.    Cardiovascular: Negative for chest pain, palpitations and leg swelling.   Gastrointestinal: Negative for abdominal pain, bloating, blood in stool, constipation, diarrhea, nausea and vomiting.   Genitourinary: Negative for dyspareunia, dysuria, flank pain, frequency, genital sores, hematuria, menorrhagia, menstrual problem, pelvic pain, urgency, vaginal bleeding, vaginal discharge, vaginal pain, dysmenorrhea, urinary incontinence and vaginal odor.   Musculoskeletal: Negative for back pain.   Neurological: Negative for syncope and headaches.           Objective:    Physical Exam:   Constitutional: She is oriented to person, place, and time. She appears well-developed and well-nourished. No distress.       Cardiovascular: Normal rate and regular rhythm.     Pulmonary/Chest: Effort normal.        Abdominal: Soft. Bowel sounds are normal. She exhibits no distension. There is no tenderness.   "   Genitourinary: Vagina normal and uterus normal. Pelvic exam was performed with patient supine. There is no rash, tenderness, lesion or injury on the right labia. There is no rash, tenderness, lesion or injury on the left labia. Uterus is not deviated, not enlarged and not tender. Cervix is normal. Right adnexum displays no mass, no tenderness and no fullness. Left adnexum displays no mass, no tenderness and no fullness. No erythema, tenderness or bleeding in the vagina. No foreign body in the vagina. No signs of injury around the vagina. No vaginal discharge found. Cervix exhibits no motion tenderness, no discharge and no friability.   Genitourinary Comments: Wet prep: negative for trichomonas, yeast, or clue cells           Musculoskeletal: Normal range of motion and moves all extremeties. She exhibits no edema or tenderness.       Neurological: She is alert and oriented to person, place, and time.    Skin: Skin is warm and dry.    Psychiatric: She has a normal mood and affect. Her behavior is normal. Thought content normal.          Assessment:        1. Vaginal discharge             Plan:      Vaginal discharge  -     POCT Wet Prep  -     No evidence of rash or vaginitis on exam today.  Pt reassured.  Await Gc/Ct results through PCP office.      Follow-up if symptoms worsen or fail to improve.

## 2018-11-03 LAB
C TRACH DNA SPEC QL NAA+PROBE: NOT DETECTED
N GONORRHOEA DNA SPEC QL NAA+PROBE: NOT DETECTED

## 2018-11-04 LAB — BACTERIA UR CULT: NORMAL

## 2018-11-05 DIAGNOSIS — E87.6 HYPOKALEMIA: ICD-10-CM

## 2018-11-05 DIAGNOSIS — E55.9 VITAMIN D DEFICIENCY: Primary | ICD-10-CM

## 2018-11-05 LAB
HAV IGM SERPL QL IA: NEGATIVE
HBV CORE IGM SERPL QL IA: NEGATIVE
HBV SURFACE AG SERPL QL IA: NEGATIVE
HCV AB SERPL QL IA: NEGATIVE
HIV 1+2 AB+HIV1 P24 AG SERPL QL IA: NEGATIVE
RPR SER QL: NORMAL

## 2018-11-05 RX ORDER — ERGOCALCIFEROL 1.25 MG/1
50000 CAPSULE ORAL
Qty: 10 CAPSULE | Refills: 0 | Status: ON HOLD | OUTPATIENT
Start: 2018-11-05 | End: 2019-09-10 | Stop reason: HOSPADM

## 2018-11-05 RX ORDER — POTASSIUM CHLORIDE 1.5 G/1.58G
20 POWDER, FOR SOLUTION ORAL 2 TIMES DAILY
Qty: 60 PACKET | Refills: 0 | Status: ON HOLD | OUTPATIENT
Start: 2018-11-05 | End: 2019-09-10 | Stop reason: HOSPADM

## 2018-12-19 ENCOUNTER — TELEPHONE (OUTPATIENT)
Dept: OBSTETRICS AND GYNECOLOGY | Facility: CLINIC | Age: 33
End: 2018-12-19

## 2018-12-19 NOTE — TELEPHONE ENCOUNTER
----- Message from Gordon Woods sent at 12/19/2018 10:37 AM CST -----  Contact: colin Asher/ [facility]  She's calling in regards to a new RX for hydrocortisone cream, morgan advishardik, morgan fax this to [phone] (fax)/ # [phone]

## 2019-01-10 DIAGNOSIS — I10 ESSENTIAL HYPERTENSION: ICD-10-CM

## 2019-01-10 RX ORDER — LISINOPRIL AND HYDROCHLOROTHIAZIDE 20; 25 MG/1; MG/1
TABLET ORAL
Qty: 90 TABLET | Refills: 0 | Status: SHIPPED | OUTPATIENT
Start: 2019-01-10 | End: 2019-05-20 | Stop reason: SDUPTHER

## 2019-05-20 DIAGNOSIS — I10 ESSENTIAL HYPERTENSION: ICD-10-CM

## 2019-05-20 RX ORDER — LISINOPRIL AND HYDROCHLOROTHIAZIDE 20; 25 MG/1; MG/1
TABLET ORAL
Qty: 90 TABLET | Refills: 0 | Status: SHIPPED | OUTPATIENT
Start: 2019-05-20 | End: 2019-07-02

## 2019-06-06 DIAGNOSIS — G56.03 CARPAL TUNNEL SYNDROME ON BOTH SIDES: Primary | ICD-10-CM

## 2019-06-10 ENCOUNTER — TELEPHONE (OUTPATIENT)
Dept: ORTHOPEDICS | Facility: CLINIC | Age: 34
End: 2019-06-10

## 2019-06-11 ENCOUNTER — OFFICE VISIT (OUTPATIENT)
Dept: PHYSICAL MEDICINE AND REHAB | Facility: CLINIC | Age: 34
End: 2019-06-11
Payer: COMMERCIAL

## 2019-06-11 VITALS
HEART RATE: 63 BPM | WEIGHT: 230.81 LBS | SYSTOLIC BLOOD PRESSURE: 128 MMHG | DIASTOLIC BLOOD PRESSURE: 85 MMHG | BODY MASS INDEX: 38.45 KG/M2 | HEIGHT: 65 IN

## 2019-06-11 DIAGNOSIS — M79.601 PARESTHESIA AND PAIN OF BOTH UPPER EXTREMITIES: Primary | ICD-10-CM

## 2019-06-11 DIAGNOSIS — R20.2 PARESTHESIA AND PAIN OF BOTH UPPER EXTREMITIES: Primary | ICD-10-CM

## 2019-06-11 DIAGNOSIS — G56.03 CARPAL TUNNEL SYNDROME ON BOTH SIDES: ICD-10-CM

## 2019-06-11 DIAGNOSIS — M79.602 PARESTHESIA AND PAIN OF BOTH UPPER EXTREMITIES: Primary | ICD-10-CM

## 2019-06-11 PROCEDURE — 99999 PR PBB SHADOW E&M-EST. PATIENT-LVL III: ICD-10-PCS | Mod: PBBFAC,,, | Performed by: PHYSICAL MEDICINE & REHABILITATION

## 2019-06-11 PROCEDURE — 3074F SYST BP LT 130 MM HG: CPT | Mod: CPTII,S$GLB,, | Performed by: PHYSICAL MEDICINE & REHABILITATION

## 2019-06-11 PROCEDURE — 3079F DIAST BP 80-89 MM HG: CPT | Mod: CPTII,S$GLB,, | Performed by: PHYSICAL MEDICINE & REHABILITATION

## 2019-06-11 PROCEDURE — 99204 OFFICE O/P NEW MOD 45 MIN: CPT | Mod: 25,S$GLB,, | Performed by: PHYSICAL MEDICINE & REHABILITATION

## 2019-06-11 PROCEDURE — 95886 PR EMG COMPLETE, W/ NERVE CONDUCTION STUDIES, 5+ MUSCLES: ICD-10-PCS | Mod: 26,S$GLB,, | Performed by: PHYSICAL MEDICINE & REHABILITATION

## 2019-06-11 PROCEDURE — 3079F PR MOST RECENT DIASTOLIC BLOOD PRESSURE 80-89 MM HG: ICD-10-PCS | Mod: CPTII,S$GLB,, | Performed by: PHYSICAL MEDICINE & REHABILITATION

## 2019-06-11 PROCEDURE — 95911 PR NERVE CONDUCTION STUDY; 9-10 STUDIES: ICD-10-PCS | Mod: 26,S$GLB,, | Performed by: PHYSICAL MEDICINE & REHABILITATION

## 2019-06-11 PROCEDURE — 3008F PR BODY MASS INDEX (BMI) DOCUMENTED: ICD-10-PCS | Mod: CPTII,S$GLB,, | Performed by: PHYSICAL MEDICINE & REHABILITATION

## 2019-06-11 PROCEDURE — 99204 PR OFFICE/OUTPT VISIT, NEW, LEVL IV, 45-59 MIN: ICD-10-PCS | Mod: 25,S$GLB,, | Performed by: PHYSICAL MEDICINE & REHABILITATION

## 2019-06-11 PROCEDURE — 95886 MUSC TEST DONE W/N TEST COMP: CPT | Mod: 26,S$GLB,, | Performed by: PHYSICAL MEDICINE & REHABILITATION

## 2019-06-11 PROCEDURE — 3074F PR MOST RECENT SYSTOLIC BLOOD PRESSURE < 130 MM HG: ICD-10-PCS | Mod: CPTII,S$GLB,, | Performed by: PHYSICAL MEDICINE & REHABILITATION

## 2019-06-11 PROCEDURE — 95911 NRV CNDJ TEST 9-10 STUDIES: CPT | Mod: 26,S$GLB,, | Performed by: PHYSICAL MEDICINE & REHABILITATION

## 2019-06-11 PROCEDURE — 99999 PR PBB SHADOW E&M-EST. PATIENT-LVL III: CPT | Mod: PBBFAC,,, | Performed by: PHYSICAL MEDICINE & REHABILITATION

## 2019-06-11 PROCEDURE — 3008F BODY MASS INDEX DOCD: CPT | Mod: CPTII,S$GLB,, | Performed by: PHYSICAL MEDICINE & REHABILITATION
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
## 2019-06-13 ENCOUNTER — TELEPHONE (OUTPATIENT)
Dept: ORTHOPEDICS | Facility: CLINIC | Age: 34
End: 2019-06-13

## 2019-07-01 ENCOUNTER — TELEPHONE (OUTPATIENT)
Dept: OBSTETRICS AND GYNECOLOGY | Facility: CLINIC | Age: 34
End: 2019-07-01

## 2019-07-01 NOTE — TELEPHONE ENCOUNTER
----- Message from Marshall Lopez sent at 7/1/2019  3:25 PM CDT -----  Contact: [phone]  Pt would like a call back to schedule an appt . Please call back at [phone].       Thank You,   Marshall Lopez    
Spoke to patient and scheduled her appointments for 07/02/19 at 7:40am to see NIALL Delcid at the [place] location. 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 Has questions regarding restrictions, etc. Patient verbalized understanding to appointment date, time, and location.   
0

## 2019-07-01 NOTE — TELEPHONE ENCOUNTER
----- Message from Chuck Lopez sent at 7/1/2019  1:16 PM CDT -----  Pt is requesting a call from nurse to schedule an apt. Please call pt back [phone]

## 2019-07-02 ENCOUNTER — INITIAL PRENATAL (OUTPATIENT)
Dept: OBSTETRICS AND GYNECOLOGY | Facility: CLINIC | Age: 34
End: 2019-07-02
Payer: COMMERCIAL

## 2019-07-02 ENCOUNTER — LAB VISIT (OUTPATIENT)
Dept: LAB | Facility: HOSPITAL | Age: 34
End: 2019-07-02
Attending: ADVANCED PRACTICE MIDWIFE
Payer: COMMERCIAL

## 2019-07-02 VITALS
BODY MASS INDEX: 40.32 KG/M2 | DIASTOLIC BLOOD PRESSURE: 78 MMHG | SYSTOLIC BLOOD PRESSURE: 124 MMHG | WEIGHT: 242.31 LBS

## 2019-07-02 DIAGNOSIS — I10 ESSENTIAL HYPERTENSION: ICD-10-CM

## 2019-07-02 DIAGNOSIS — O09.299 HX OF PREECLAMPSIA, PRIOR PREGNANCY, CURRENTLY PREGNANT: ICD-10-CM

## 2019-07-02 DIAGNOSIS — Z34.90 NORMAL INTRAUTERINE PREGNANCY, ANTEPARTUM: Primary | ICD-10-CM

## 2019-07-02 DIAGNOSIS — Z98.890 HISTORY OF CERCLAGE, CURRENTLY PREGNANT: ICD-10-CM

## 2019-07-02 DIAGNOSIS — J45.909 ASTHMA, UNSPECIFIED ASTHMA SEVERITY, UNSPECIFIED WHETHER COMPLICATED, UNSPECIFIED WHETHER PERSISTENT: ICD-10-CM

## 2019-07-02 DIAGNOSIS — O34.219 HISTORY OF CESAREAN DELIVERY, CURRENTLY PREGNANT: ICD-10-CM

## 2019-07-02 DIAGNOSIS — O09.299 HISTORY OF CERCLAGE, CURRENTLY PREGNANT: ICD-10-CM

## 2019-07-02 DIAGNOSIS — Z34.90 NORMAL INTRAUTERINE PREGNANCY, ANTEPARTUM: ICD-10-CM

## 2019-07-02 LAB
ALBUMIN SERPL BCP-MCNC: 3.6 G/DL (ref 3.5–5.2)
ALP SERPL-CCNC: 56 U/L (ref 55–135)
ALT SERPL W/O P-5'-P-CCNC: 15 U/L (ref 10–44)
ANION GAP SERPL CALC-SCNC: 10 MMOL/L (ref 8–16)
AST SERPL-CCNC: 14 U/L (ref 10–40)
BASOPHILS # BLD AUTO: 0.03 K/UL (ref 0–0.2)
BASOPHILS NFR BLD: 0.5 % (ref 0–1.9)
BILIRUB SERPL-MCNC: 0.4 MG/DL (ref 0.1–1)
BILIRUB UR QL STRIP: NEGATIVE
BUN SERPL-MCNC: 7 MG/DL (ref 6–20)
C TRACH DNA SPEC QL NAA+PROBE: NOT DETECTED
CALCIUM SERPL-MCNC: 9.2 MG/DL (ref 8.7–10.5)
CHLORIDE SERPL-SCNC: 105 MMOL/L (ref 95–110)
CLARITY UR: ABNORMAL
CO2 SERPL-SCNC: 23 MMOL/L (ref 23–29)
COLOR UR: YELLOW
CREAT SERPL-MCNC: 0.9 MG/DL (ref 0.5–1.4)
CREAT UR-MCNC: 206 MG/DL (ref 15–325)
DIFFERENTIAL METHOD: NORMAL
EOSINOPHIL # BLD AUTO: 0.2 K/UL (ref 0–0.5)
EOSINOPHIL NFR BLD: 2.6 % (ref 0–8)
ERYTHROCYTE [DISTWIDTH] IN BLOOD BY AUTOMATED COUNT: 13.7 % (ref 11.5–14.5)
EST. GFR  (AFRICAN AMERICAN): >60 ML/MIN/1.73 M^2
EST. GFR  (NON AFRICAN AMERICAN): >60 ML/MIN/1.73 M^2
GLUCOSE SERPL-MCNC: 89 MG/DL (ref 70–110)
GLUCOSE UR QL STRIP: NEGATIVE
HCG INTACT+B SERPL-ACNC: 238 MIU/ML
HCT VFR BLD AUTO: 40.4 % (ref 37–48.5)
HGB BLD-MCNC: 13 G/DL (ref 12–16)
HGB S BLD QL SOLY: NEGATIVE
HGB UR QL STRIP: ABNORMAL
IMM GRANULOCYTES # BLD AUTO: 0.02 K/UL (ref 0–0.04)
IMM GRANULOCYTES NFR BLD AUTO: 0.4 % (ref 0–0.5)
KETONES UR QL STRIP: NEGATIVE
LEUKOCYTE ESTERASE UR QL STRIP: NEGATIVE
LYMPHOCYTES # BLD AUTO: 1.7 K/UL (ref 1–4.8)
LYMPHOCYTES NFR BLD: 30.6 % (ref 18–48)
MCH RBC QN AUTO: 29.8 PG (ref 27–31)
MCHC RBC AUTO-ENTMCNC: 32.2 G/DL (ref 32–36)
MCV RBC AUTO: 93 FL (ref 82–98)
MONOCYTES # BLD AUTO: 0.5 K/UL (ref 0.3–1)
MONOCYTES NFR BLD: 8.1 % (ref 4–15)
N GONORRHOEA DNA SPEC QL NAA+PROBE: NOT DETECTED
NEUTROPHILS # BLD AUTO: 3.3 K/UL (ref 1.8–7.7)
NEUTROPHILS NFR BLD: 57.8 % (ref 38–73)
NITRITE UR QL STRIP: NEGATIVE
NRBC BLD-RTO: 0 /100 WBC
PH UR STRIP: 6 [PH] (ref 5–8)
PLATELET # BLD AUTO: 215 K/UL (ref 150–350)
PMV BLD AUTO: 11.8 FL (ref 9.2–12.9)
POTASSIUM SERPL-SCNC: 3.7 MMOL/L (ref 3.5–5.1)
PROT SERPL-MCNC: 7.7 G/DL (ref 6–8.4)
PROT UR QL STRIP: NEGATIVE
PROT UR-MCNC: 17 MG/DL (ref 0–15)
PROT/CREAT UR: 0.08 MG/G{CREAT} (ref 0–0.2)
RBC # BLD AUTO: 4.36 M/UL (ref 4–5.4)
SODIUM SERPL-SCNC: 138 MMOL/L (ref 136–145)
SP GR UR STRIP: 1.02 (ref 1–1.03)
URN SPEC COLLECT METH UR: ABNORMAL
WBC # BLD AUTO: 5.68 K/UL (ref 3.9–12.7)

## 2019-07-02 PROCEDURE — 88141 CYTOPATH C/V INTERPRET: CPT | Mod: ,,, | Performed by: PATHOLOGY

## 2019-07-02 PROCEDURE — 99213 OFFICE O/P EST LOW 20 MIN: CPT | Mod: S$GLB,,, | Performed by: ADVANCED PRACTICE MIDWIFE

## 2019-07-02 PROCEDURE — 36415 COLL VENOUS BLD VENIPUNCTURE: CPT

## 2019-07-02 PROCEDURE — 82570 ASSAY OF URINE CREATININE: CPT

## 2019-07-02 PROCEDURE — 99213 PR OFFICE/OUTPT VISIT, EST, LEVL III, 20-29 MIN: ICD-10-PCS | Mod: S$GLB,,, | Performed by: ADVANCED PRACTICE MIDWIFE

## 2019-07-02 PROCEDURE — 85660 RBC SICKLE CELL TEST: CPT

## 2019-07-02 PROCEDURE — 86592 SYPHILIS TEST NON-TREP QUAL: CPT

## 2019-07-02 PROCEDURE — 86901 BLOOD TYPING SEROLOGIC RH(D): CPT

## 2019-07-02 PROCEDURE — 99999 PR PBB SHADOW E&M-EST. PATIENT-LVL III: CPT | Mod: PBBFAC,,, | Performed by: ADVANCED PRACTICE MIDWIFE

## 2019-07-02 PROCEDURE — 3008F PR BODY MASS INDEX (BMI) DOCUMENTED: ICD-10-PCS | Mod: CPTII,S$GLB,, | Performed by: ADVANCED PRACTICE MIDWIFE

## 2019-07-02 PROCEDURE — 86850 RBC ANTIBODY SCREEN: CPT

## 2019-07-02 PROCEDURE — 86762 RUBELLA ANTIBODY: CPT

## 2019-07-02 PROCEDURE — 88142 CYTOPATH C/V THIN LAYER: CPT | Performed by: PATHOLOGY

## 2019-07-02 PROCEDURE — 80053 COMPREHEN METABOLIC PANEL: CPT

## 2019-07-02 PROCEDURE — 87491 CHLMYD TRACH DNA AMP PROBE: CPT

## 2019-07-02 PROCEDURE — 87340 HEPATITIS B SURFACE AG IA: CPT

## 2019-07-02 PROCEDURE — 87086 URINE CULTURE/COLONY COUNT: CPT

## 2019-07-02 PROCEDURE — 3008F BODY MASS INDEX DOCD: CPT | Mod: CPTII,S$GLB,, | Performed by: ADVANCED PRACTICE MIDWIFE

## 2019-07-02 PROCEDURE — 81003 URINALYSIS AUTO W/O SCOPE: CPT

## 2019-07-02 PROCEDURE — 84702 CHORIONIC GONADOTROPIN TEST: CPT

## 2019-07-02 PROCEDURE — 86703 HIV-1/HIV-2 1 RESULT ANTBDY: CPT

## 2019-07-02 PROCEDURE — 99999 PR PBB SHADOW E&M-EST. PATIENT-LVL III: ICD-10-PCS | Mod: PBBFAC,,, | Performed by: ADVANCED PRACTICE MIDWIFE

## 2019-07-02 PROCEDURE — 85025 COMPLETE CBC W/AUTO DIFF WBC: CPT

## 2019-07-02 PROCEDURE — 88141 LIQUID-BASED PAP SMEAR, SCREENING: ICD-10-PCS | Mod: ,,, | Performed by: PATHOLOGY

## 2019-07-02 NOTE — PATIENT INSTRUCTIONS
Adapting to Pregnancy: First Trimester  As your body adjusts, you may have to change or limit your daily activities. Youll need more rest. You may also need to use the energy you have more wisely.     Eat stomach-friendly foods like cottage cheese, crackers, or bread throughout the day.   Your changing body  Almost every part of your body is affected as you adapt to pregnancy. The uterus and cervix will begin to soften right away. You may not look very pregnant during the first 3 months. But you are likely to have some common signs of early pregnancy:  · Nausea  · Fatigue  · Frequent urination  · Mood swings  · Bloating of the abdomen  · Missed or light periods (first trimester bleeding)  · Nipple or breast tenderness, breast swelling  Its not too late to start good habits  What matters most is protecting your baby from this moment on. If you smoke, drink alcohol, or use drugs, now is the time to stop. If you need help, talk with your healthcare provider.  · Smoking increases the risk of  stillbirth or having a low-birth-weight baby. If you smoke, quit now.  · Alcohol and drugs have been linked with miscarriage, birth defects, intellectual disability, and low birth weight. Do not drink alcohol or take drugs.  Tips to relieve nausea  Although nausea can happen at any time of the day, it may be worse in the morning. To help prevent nausea:  · Eat small, light meals at frequent intervals.  · Get up slowly. Eat a few unsalted crackers before you get out of bed.  · Avoid smells that bother you.  · Avoid spicy and fatty foods.  · Eat an ice pop in your favorite flavor.  · Get plenty of rest.  · Ask your healthcare provider about taking madonna or vitamin B6 for nausea and vomiting.  · Talk with your healthcare provider if you take vitamins that upset your stomach.  Work concerns  The end of the first trimester is a good time to discuss working during pregnancy with your employer. Follow your healthcare providers  "advice if your job requires you to stand for a long time, work with hazardous tools, or even sit at a desk all day. Your workspace, workload, or scheduled hours may need to be adjusted. Perhaps you can change body postures more often or take an extra break.  Advice for travel  Talk to your healthcare provider first, but the second trimester may be the best time for any travel. You may be advised to avoid certain trips while youre pregnant. Food and water can be concerns in developing countries. Travel by car is a good choice, as you can stop, get out, and stretch. Bring snacks and water along. Fasten the lap belt below your belly, low over your hips. Also be sure to wear the shoulder harness.  Intimacy  Unless your healthcare provider tells you to, there is no reason to stop having sex while youre pregnant. You or your partner may notice changes in desire. Desire may be less in the first trimester, due to nausea and fatigue. In the second trimester, sex may be very enjoyable. The third trimester can be a challenge comfort-wise. Try different positions and see whats best for you both.  Date Last Reviewed: 8/16/2015  © 8416-7819 [employer] [address] [place] [address]. All rights reserved. This information is not intended as a substitute for professional medical care. Always follow your healthcare professional's instructions.        Pregnancy: Your First Trimester Changes  The first trimester is a time of rapid development for your baby. Because your baby is growing so quickly, it is important that you start a healthy lifestyle right away. By the end of the first trimester, your baby has formed all of its major body organs and weighs just over an ounce.     Actual size of baby is 1/4"    Month 1 (Weeks 1 to 4)  The placenta (the organ that nourishes your baby) begins to form. The brain, spinal cord, heart, gastrointestinal tract, and lungs begin to develop. Your baby is about 1/4 inch " "long by the end of the first month.     Actual size of baby is 1"    Month 2 (Weeks 5 to 8)  All of your babys major body organs form. The face, fingers, toes, ears, and eyes appear. By the end of the month, your baby is about 1-inch long.     Actual size of baby is 4"    Month 3 (Weeks 9 to 12)  Your baby can open and close its fists and mouth. The sexual organs begin to form. As the first trimester ends, your baby is about 3-inches long.  Date Last Reviewed: 8/16/2015  © 5848-9109 [employer]. [address] [place] [address]. All rights reserved. This information is not intended as a substitute for professional medical care. Always follow your healthcare professional's instructions.        "

## 2019-07-02 NOTE — PROGRESS NOTES
New OB today.    Regular consent signed today.    Last Pap 2016-normal.    Pap, GC and chlamydia today.    Prenatal lab with beta HCG.    Chronic hypertensive on lisinopril 20 mg HCTZ 25 mg since .  PCP Dr. Abdul Normotensive today will change to labetalol 100 mg b.i.d..  Add CMP and PCR to prenatal lab and advised daily baby aspirin started 12-14 weeks.    History of  section at 36 weeks at [facility] with Dr. Cass Haywood, also history of right oophorectomy at another time.  Medical release forms are requested for both these operative notes  History of cerclage at 4 months, incidental finding of amniotic sac in vaginal vault at late prenatal visit.  Immediately sent for cerclage -this note has been requested  Advised daily prenatal vitamins.    First trimester precautions reviewed.    Follow-up beta HCG results

## 2019-07-03 ENCOUNTER — TELEPHONE (OUTPATIENT)
Dept: OBSTETRICS AND GYNECOLOGY | Facility: CLINIC | Age: 34
End: 2019-07-03

## 2019-07-03 DIAGNOSIS — O34.219 HISTORY OF CESAREAN DELIVERY, CURRENTLY PREGNANT: Primary | ICD-10-CM

## 2019-07-03 LAB
ABO + RH BLD: NORMAL
BACTERIA UR CULT: NORMAL
BLD GP AB SCN CELLS X3 SERPL QL: NORMAL
HBV SURFACE AG SERPL QL IA: NEGATIVE
HIV 1+2 AB+HIV1 P24 AG SERPL QL IA: NEGATIVE
RH BLD: NORMAL
RPR SER QL: NORMAL
RUBV IGG SER-ACNC: 39.6 IU/ML
RUBV IGG SER-IMP: REACTIVE

## 2019-07-03 RX ORDER — LABETALOL 100 MG/1
100 TABLET, FILM COATED ORAL 2 TIMES DAILY
Qty: 60 TABLET | Refills: 11 | Status: SHIPPED | OUTPATIENT
Start: 2019-07-03 | End: 2020-01-15

## 2019-07-08 ENCOUNTER — LAB VISIT (OUTPATIENT)
Dept: LAB | Facility: HOSPITAL | Age: 34
End: 2019-07-08
Attending: ADVANCED PRACTICE MIDWIFE
Payer: COMMERCIAL

## 2019-07-08 DIAGNOSIS — O34.219 HISTORY OF CESAREAN DELIVERY, CURRENTLY PREGNANT: ICD-10-CM

## 2019-07-08 LAB — HCG INTACT+B SERPL-ACNC: 2386 MIU/ML

## 2019-07-08 PROCEDURE — 36415 COLL VENOUS BLD VENIPUNCTURE: CPT

## 2019-07-08 PROCEDURE — 84702 CHORIONIC GONADOTROPIN TEST: CPT

## 2019-07-09 ENCOUNTER — TELEPHONE (OUTPATIENT)
Dept: OBSTETRICS AND GYNECOLOGY | Facility: CLINIC | Age: 34
End: 2019-07-09

## 2019-07-09 NOTE — TELEPHONE ENCOUNTER
Called, informed the beta HCG results [id number].  Patient doing well with no complaints and ultrasound is scheduled for 715 at 7:30 a.m..  Miscarriage precautions are reviewed

## 2019-07-15 ENCOUNTER — ROUTINE PRENATAL (OUTPATIENT)
Dept: OBSTETRICS AND GYNECOLOGY | Facility: CLINIC | Age: 34
End: 2019-07-15
Payer: COMMERCIAL

## 2019-07-15 ENCOUNTER — PROCEDURE VISIT (OUTPATIENT)
Dept: OBSTETRICS AND GYNECOLOGY | Facility: CLINIC | Age: 34
End: 2019-07-15
Payer: COMMERCIAL

## 2019-07-15 VITALS
DIASTOLIC BLOOD PRESSURE: 74 MMHG | BODY MASS INDEX: 41.64 KG/M2 | WEIGHT: 250.25 LBS | SYSTOLIC BLOOD PRESSURE: 134 MMHG

## 2019-07-15 DIAGNOSIS — O34.219 HISTORY OF CESAREAN DELIVERY, CURRENTLY PREGNANT: ICD-10-CM

## 2019-07-15 DIAGNOSIS — Z98.890 HISTORY OF CERCLAGE, CURRENTLY PREGNANT: Primary | ICD-10-CM

## 2019-07-15 DIAGNOSIS — Z34.90 NORMAL INTRAUTERINE PREGNANCY, ANTEPARTUM: ICD-10-CM

## 2019-07-15 DIAGNOSIS — O09.299 HISTORY OF CERCLAGE, CURRENTLY PREGNANT: Primary | ICD-10-CM

## 2019-07-15 PROCEDURE — 76801 PR US, OB <14WKS, TRANSABD, SINGLE GESTATION: ICD-10-PCS | Mod: S$GLB,,, | Performed by: OBSTETRICS & GYNECOLOGY

## 2019-07-15 PROCEDURE — 0502F PR SUBSEQUENT PRENATAL CARE: ICD-10-PCS | Mod: CPTII,S$GLB,, | Performed by: ADVANCED PRACTICE MIDWIFE

## 2019-07-15 PROCEDURE — 0502F SUBSEQUENT PRENATAL CARE: CPT | Mod: CPTII,S$GLB,, | Performed by: ADVANCED PRACTICE MIDWIFE

## 2019-07-15 PROCEDURE — 76801 OB US < 14 WKS SINGLE FETUS: CPT | Mod: S$GLB,,, | Performed by: OBSTETRICS & GYNECOLOGY

## 2019-07-15 PROCEDURE — 99999 PR PBB SHADOW E&M-EST. PATIENT-LVL II: ICD-10-PCS | Mod: PBBFAC,,, | Performed by: ADVANCED PRACTICE MIDWIFE

## 2019-07-15 PROCEDURE — 99999 PR PBB SHADOW E&M-EST. PATIENT-LVL II: CPT | Mod: PBBFAC,,, | Performed by: ADVANCED PRACTICE MIDWIFE

## 2019-07-15 NOTE — PATIENT INSTRUCTIONS
Adapting to Pregnancy: First Trimester  As your body adjusts, you may have to change or limit your daily activities. Youll need more rest. You may also need to use the energy you have more wisely.     Eat stomach-friendly foods like cottage cheese, crackers, or bread throughout the day.   Your changing body  Almost every part of your body is affected as you adapt to pregnancy. The uterus and cervix will begin to soften right away. You may not look very pregnant during the first 3 months. But you are likely to have some common signs of early pregnancy:  · Nausea  · Fatigue  · Frequent urination  · Mood swings  · Bloating of the abdomen  · Missed or light periods (first trimester bleeding)  · Nipple or breast tenderness, breast swelling  Its not too late to start good habits  What matters most is protecting your baby from this moment on. If you smoke, drink alcohol, or use drugs, now is the time to stop. If you need help, talk with your healthcare provider.  · Smoking increases the risk of  stillbirth or having a low-birth-weight baby. If you smoke, quit now.  · Alcohol and drugs have been linked with miscarriage, birth defects, intellectual disability, and low birth weight. Do not drink alcohol or take drugs.  Tips to relieve nausea  Although nausea can happen at any time of the day, it may be worse in the morning. To help prevent nausea:  · Eat small, light meals at frequent intervals.  · Get up slowly. Eat a few unsalted crackers before you get out of bed.  · Avoid smells that bother you.  · Avoid spicy and fatty foods.  · Eat an ice pop in your favorite flavor.  · Get plenty of rest.  · Ask your healthcare provider about taking madonna or vitamin B6 for nausea and vomiting.  · Talk with your healthcare provider if you take vitamins that upset your stomach.  Work concerns  The end of the first trimester is a good time to discuss working during pregnancy with your employer. Follow your healthcare providers  "advice if your job requires you to stand for a long time, work with hazardous tools, or even sit at a desk all day. Your workspace, workload, or scheduled hours may need to be adjusted. Perhaps you can change body postures more often or take an extra break.  Advice for travel  Talk to your healthcare provider first, but the second trimester may be the best time for any travel. You may be advised to avoid certain trips while youre pregnant. Food and water can be concerns in developing countries. Travel by car is a good choice, as you can stop, get out, and stretch. Bring snacks and water along. Fasten the lap belt below your belly, low over your hips. Also be sure to wear the shoulder harness.  Intimacy  Unless your healthcare provider tells you to, there is no reason to stop having sex while youre pregnant. You or your partner may notice changes in desire. Desire may be less in the first trimester, due to nausea and fatigue. In the second trimester, sex may be very enjoyable. The third trimester can be a challenge comfort-wise. Try different positions and see whats best for you both.  Date Last Reviewed: 8/16/2015  © 2688-6977 [employer]. [address] [place] [address]. All rights reserved. This information is not intended as a substitute for professional medical care. Always follow your healthcare professional's instructions.        Pregnancy: Your First Trimester Changes  The first trimester is a time of rapid development for your baby. Because your baby is growing so quickly, it is important that you start a healthy lifestyle right away. By the end of the first trimester, your baby has formed all of its major body organs and weighs just over an ounce.     Actual size of baby is 1/4"    Month 1 (Weeks 1 to 4)  The placenta (the organ that nourishes your baby) begins to form. The brain, spinal cord, heart, gastrointestinal tract, and lungs begin to develop. Your baby is about 1/4 inch " "long by the end of the first month.     Actual size of baby is 1"    Month 2 (Weeks 5 to 8)  All of your babys major body organs form. The face, fingers, toes, ears, and eyes appear. By the end of the month, your baby is about 1-inch long.     Actual size of baby is 4"    Month 3 (Weeks 9 to 12)  Your baby can open and close its fists and mouth. The sexual organs begin to form. As the first trimester ends, your baby is about 3-inches long.  Date Last Reviewed: 8/16/2015  © 0034-8714 [employer]. [address] [place] [address]. All rights reserved. This information is not intended as a substitute for professional medical care. Always follow your healthcare professional's instructions.        "

## 2019-07-15 NOTE — PROGRESS NOTES
Prenatal lab -unremarkable.    Normotensive.    Ultrasound-single viable intrauterine pregnancy consistent with LMP dating.    Declines screening.    Repeat ultrasound -cervical length prior to seeing Dr. Smith secondary to history of cervical cerclage.    First trimester precautions reviewed

## 2019-08-22 ENCOUNTER — PROCEDURE VISIT (OUTPATIENT)
Dept: OBSTETRICS AND GYNECOLOGY | Facility: CLINIC | Age: 34
End: 2019-08-22
Payer: COMMERCIAL

## 2019-08-22 ENCOUNTER — ROUTINE PRENATAL (OUTPATIENT)
Dept: OBSTETRICS AND GYNECOLOGY | Facility: CLINIC | Age: 34
End: 2019-08-22
Payer: COMMERCIAL

## 2019-08-22 VITALS
BODY MASS INDEX: 40.54 KG/M2 | WEIGHT: 243.63 LBS | SYSTOLIC BLOOD PRESSURE: 112 MMHG | DIASTOLIC BLOOD PRESSURE: 78 MMHG

## 2019-08-22 DIAGNOSIS — O99.210 OBESITY COMPLICATING PREGNANCY, CHILDBIRTH, OR PUERPERIUM, ANTEPARTUM: ICD-10-CM

## 2019-08-22 DIAGNOSIS — O09.299 HISTORY OF CERCLAGE, CURRENTLY PREGNANT: ICD-10-CM

## 2019-08-22 DIAGNOSIS — Z98.891 H/O: C-SECTION: Primary | ICD-10-CM

## 2019-08-22 DIAGNOSIS — Z98.890 HISTORY OF CERCLAGE, CURRENTLY PREGNANT: ICD-10-CM

## 2019-08-22 DIAGNOSIS — Z3A.12 PREGNANCY WITH 12 COMPLETED WEEKS GESTATION: ICD-10-CM

## 2019-08-22 PROCEDURE — 0502F PR SUBSEQUENT PRENATAL CARE: ICD-10-PCS | Mod: CPTII,S$GLB,, | Performed by: OBSTETRICS & GYNECOLOGY

## 2019-08-22 PROCEDURE — 76817 PR US, OB, TRANSVAG APPROACH: ICD-10-PCS | Mod: S$GLB,,, | Performed by: OBSTETRICS & GYNECOLOGY

## 2019-08-22 PROCEDURE — 99999 PR PBB SHADOW E&M-EST. PATIENT-LVL II: CPT | Mod: PBBFAC,,, | Performed by: OBSTETRICS & GYNECOLOGY

## 2019-08-22 PROCEDURE — 76817 TRANSVAGINAL US OBSTETRIC: CPT | Mod: S$GLB,,, | Performed by: OBSTETRICS & GYNECOLOGY

## 2019-08-22 PROCEDURE — 99999 PR PBB SHADOW E&M-EST. PATIENT-LVL II: ICD-10-PCS | Mod: PBBFAC,,, | Performed by: OBSTETRICS & GYNECOLOGY

## 2019-08-22 PROCEDURE — 0502F SUBSEQUENT PRENATAL CARE: CPT | Mod: CPTII,S$GLB,, | Performed by: OBSTETRICS & GYNECOLOGY

## 2019-08-23 ENCOUNTER — TELEPHONE (OUTPATIENT)
Dept: OBSTETRICS AND GYNECOLOGY | Facility: CLINIC | Age: 34
End: 2019-08-23

## 2019-08-23 DIAGNOSIS — N88.3 INCOMPETENT CERVIX: Primary | ICD-10-CM

## 2019-08-26 ENCOUNTER — TELEPHONE (OUTPATIENT)
Dept: OBSTETRICS AND GYNECOLOGY | Facility: CLINIC | Age: 34
End: 2019-08-26

## 2019-08-26 NOTE — PROGRESS NOTES
Pt here to discuss . Op note from previous cs reviewed-does not specify LTCS incision. Cs done for NRFHTs after induction at 36w for severe preE. Pt instructed to start on baby ASA. Aware of r/b of , limitations in SONYA.  Also has h/o rescue cerclage w/ hourglassing membranes-no records for review. If true, will need prophylactic cerclage 14-16 weeks.

## 2019-08-26 NOTE — TELEPHONE ENCOUNTER
Attempted to contact patient x2. No answer. Unable to leave message. Automatic voice message: Your call cannot be completed at this time, please try again later.

## 2019-08-26 NOTE — TELEPHONE ENCOUNTER
----- Message from Vandana Smith MD sent at 8/26/2019 12:58 PM CDT -----  Pt scheduled for cerclage. No records available from prenatal history or previous cerclage (only has cs op note). Please re-request

## 2019-08-28 DIAGNOSIS — Z01.818 PRE-OP EXAM: Primary | ICD-10-CM

## 2019-08-28 NOTE — PRE-PROCEDURE INSTRUCTIONS
Pre op instructions reviewed with patient per phone.    To confirm, Your surgeon has instructed you:  Surgery is scheduled 09/10/19 at 1000.      Please report to [facility] 1st floor main lobby by 0830.  Pre admit office to call afternoon prior to surgery with final arrival time.    I informed pt that OB would be monitoring baby's heart tones pre and post op. Also, pt states she has a piercing in her ear that is not removable. I instructed her to get the piercing business she is going to visit to get a plastic/rubber replacement for her nose ring to remove her earring. I explained that the earring must come out. Pt verbalized understanding.     INSTRUCTIONS IMPORTANT!!!  ¨ No smoking after 12 midnight, the night before surgery.  ¨ No solid food after 12 midnight, but you may have clear liquids up until 3 hours prior to surgery.  This includes: grape, cranberry, and apple juice (not orange, and no coffee.)   ¨ OK to brush teeth, but no gum, candy or mints!    ¨ Take only these medicines with a small swallow of water-morning of surgery.  labetalol  ____  Do not wear makeup, including mascara.  ____  No powder, lotions or creams to surgical area.  ____  Please remove all jewelry, including piercings and leave at home.  ____  No money or valuables needed. Please leave at home.  ____  Please bring identification and insurance information to hospital.  ____  If going home the same day, arrange for a ride home. You will not be able to   drive if Anesthesia was used.  ____  Children, under 12 years old, must remain in the waiting room with an adult.  They are not allowed in patient areas.  ____  Wear loose fitting clothing. Allow for dressings, bandages.  ____  Stop Aspirin, Ibuprofen, Motrin and Aleve at least 5-7 days before surgery, unless otherwise instructed by your doctor, or the nurse.   You MAY use Tylenol/acetaminophen until day of surgery.  ____  If you take diabetic medication, do not  take am of surgery unless instructed by   Doctor.  ____ Stop taking any Fish Oil supplement or any Vitamins that contain Vitamin E at least 5 days prior to surgery.          Bathing Instructions-- The night before surgery and the morning prior to coming to the hospital:   -Do not shave the surgical area.   -Shower and wash your hair and body as usual with your regular soap and shampoo.   -Rinse your hair and body completely.   -Dry with clean, soft towel.  Do not use lotion, cream, deodorant, or powders on   the surgical site.    Use antibacterial soap in place of hibiclens if your surgery is on the head, face or genitals.         Surgical Site Infection    Prevention of surgical site infections:     -Keep incisions clean and dry.   -Do not soak/submerge incisions in water until completely healed.   -Do not apply lotions, powders, creams, or deodorants to site.   -Always make sure hands are cleaned with antibacterial soap/ alcohol-based   prior to touching the surgical site.  (This includes doctors, nurses, staff, and yourself.)    Signs and symptoms:   -Redness and pain around the area where you had surgery   -Drainage of cloudy fluid from your surgical wound   -Fever over 100.4  I have read or had read and explained to me, and understand the above information.

## 2019-08-29 ENCOUNTER — ANESTHESIA EVENT (OUTPATIENT)
Dept: SURGERY | Facility: HOSPITAL | Age: 34
End: 2019-08-29
Payer: COMMERCIAL

## 2019-08-29 ENCOUNTER — CLINICAL SUPPORT (OUTPATIENT)
Dept: CARDIOLOGY | Facility: CLINIC | Age: 34
End: 2019-08-29
Payer: COMMERCIAL

## 2019-08-29 ENCOUNTER — LAB VISIT (OUTPATIENT)
Dept: LAB | Facility: HOSPITAL | Age: 34
End: 2019-08-29
Attending: OBSTETRICS & GYNECOLOGY
Payer: COMMERCIAL

## 2019-08-29 DIAGNOSIS — Z01.818 PRE-OP EXAM: ICD-10-CM

## 2019-08-29 LAB
BASOPHILS # BLD AUTO: 0.03 K/UL (ref 0–0.2)
BASOPHILS NFR BLD: 0.4 % (ref 0–1.9)
DIFFERENTIAL METHOD: ABNORMAL
EOSINOPHIL # BLD AUTO: 0.2 K/UL (ref 0–0.5)
EOSINOPHIL NFR BLD: 2.2 % (ref 0–8)
ERYTHROCYTE [DISTWIDTH] IN BLOOD BY AUTOMATED COUNT: 13.6 % (ref 11.5–14.5)
HCT VFR BLD AUTO: 35.5 % (ref 37–48.5)
HGB BLD-MCNC: 11.7 G/DL (ref 12–16)
IMM GRANULOCYTES # BLD AUTO: 0.03 K/UL (ref 0–0.04)
IMM GRANULOCYTES NFR BLD AUTO: 0.4 % (ref 0–0.5)
LYMPHOCYTES # BLD AUTO: 1.8 K/UL (ref 1–4.8)
LYMPHOCYTES NFR BLD: 23.9 % (ref 18–48)
MCH RBC QN AUTO: 30.2 PG (ref 27–31)
MCHC RBC AUTO-ENTMCNC: 33 G/DL (ref 32–36)
MCV RBC AUTO: 92 FL (ref 82–98)
MONOCYTES # BLD AUTO: 0.6 K/UL (ref 0.3–1)
MONOCYTES NFR BLD: 7.9 % (ref 4–15)
NEUTROPHILS # BLD AUTO: 5 K/UL (ref 1.8–7.7)
NEUTROPHILS NFR BLD: 65.2 % (ref 38–73)
NRBC BLD-RTO: 0 /100 WBC
PLATELET # BLD AUTO: 194 K/UL (ref 150–350)
PMV BLD AUTO: 12 FL (ref 9.2–12.9)
RBC # BLD AUTO: 3.88 M/UL (ref 4–5.4)
WBC # BLD AUTO: 7.7 K/UL (ref 3.9–12.7)

## 2019-08-29 PROCEDURE — 93010 EKG 12-LEAD: ICD-10-PCS | Mod: S$GLB,,, | Performed by: INTERNAL MEDICINE

## 2019-08-29 PROCEDURE — 93005 ELECTROCARDIOGRAM TRACING: CPT | Mod: S$GLB,,, | Performed by: OBSTETRICS & GYNECOLOGY

## 2019-08-29 PROCEDURE — 36415 COLL VENOUS BLD VENIPUNCTURE: CPT

## 2019-08-29 PROCEDURE — 85025 COMPLETE CBC W/AUTO DIFF WBC: CPT

## 2019-08-29 PROCEDURE — 93005 EKG 12-LEAD: ICD-10-PCS | Mod: S$GLB,,, | Performed by: OBSTETRICS & GYNECOLOGY

## 2019-08-29 PROCEDURE — 93010 ELECTROCARDIOGRAM REPORT: CPT | Mod: S$GLB,,, | Performed by: INTERNAL MEDICINE

## 2019-08-29 NOTE — TELEPHONE ENCOUNTER
Spoke to patient and let her know that we need to get a copy of her previous cerclage that was done. Asked if she could come to sign a medical records release form. Patient says she can come today since she has blood work to do at the [place] location at 11:45am. She will come before her blood work. Let her know to come to the 2nd floor and ask for Dr. Andersen nurse to sign the medical records release. TIEN Pimentel notified at the [place] that patient will be coming.

## 2019-09-10 ENCOUNTER — HOSPITAL ENCOUNTER (OUTPATIENT)
Facility: HOSPITAL | Age: 34
Discharge: HOME | End: 2019-09-10
Attending: OBSTETRICS & GYNECOLOGY | Admitting: OBSTETRICS & GYNECOLOGY
Payer: COMMERCIAL

## 2019-09-10 ENCOUNTER — ANESTHESIA (OUTPATIENT)
Dept: SURGERY | Facility: HOSPITAL | Age: 34
End: 2019-09-10
Payer: COMMERCIAL

## 2019-09-10 ENCOUNTER — TELEPHONE (OUTPATIENT)
Dept: OBSTETRICS AND GYNECOLOGY | Facility: CLINIC | Age: 34
End: 2019-09-10

## 2019-09-10 VITALS
WEIGHT: 247.38 LBS | HEIGHT: 65 IN | BODY MASS INDEX: 41.22 KG/M2 | OXYGEN SATURATION: 99 % | HEART RATE: 78 BPM | DIASTOLIC BLOOD PRESSURE: 73 MMHG | TEMPERATURE: 99 F | SYSTOLIC BLOOD PRESSURE: 136 MMHG | RESPIRATION RATE: 18 BRPM

## 2019-09-10 DIAGNOSIS — O09.292 H/O INCOMPETENT CERVIX, CURRENTLY PREGNANT, SECOND TRIMESTER: ICD-10-CM

## 2019-09-10 PROBLEM — O34.32 CERVICAL CERCLAGE SUTURE PRESENT IN SECOND TRIMESTER: Status: ACTIVE | Noted: 2019-09-10

## 2019-09-10 PROCEDURE — 59320 REVISION OF CERVIX: CPT | Mod: ,,, | Performed by: OBSTETRICS & GYNECOLOGY

## 2019-09-10 PROCEDURE — 71000033 HC RECOVERY, INTIAL HOUR: Performed by: OBSTETRICS & GYNECOLOGY

## 2019-09-10 PROCEDURE — 63600175 PHARM REV CODE 636 W HCPCS: Performed by: NURSE ANESTHETIST, CERTIFIED REGISTERED

## 2019-09-10 PROCEDURE — 71000015 HC POSTOP RECOV 1ST HR: Performed by: OBSTETRICS & GYNECOLOGY

## 2019-09-10 PROCEDURE — 36000706: Performed by: OBSTETRICS & GYNECOLOGY

## 2019-09-10 PROCEDURE — S0020 INJECTION, BUPIVICAINE HYDRO: HCPCS | Performed by: ANESTHESIOLOGY

## 2019-09-10 PROCEDURE — 37000009 HC ANESTHESIA EA ADD 15 MINS: Performed by: OBSTETRICS & GYNECOLOGY

## 2019-09-10 PROCEDURE — 71000039 HC RECOVERY, EACH ADD'L HOUR: Performed by: OBSTETRICS & GYNECOLOGY

## 2019-09-10 PROCEDURE — 59320 PR REVISION CERVIX W PREG,VAG APPRCH: ICD-10-PCS | Mod: ,,, | Performed by: OBSTETRICS & GYNECOLOGY

## 2019-09-10 PROCEDURE — 25000003 PHARM REV CODE 250: Performed by: ANESTHESIOLOGY

## 2019-09-10 PROCEDURE — 71000016 HC POSTOP RECOV ADDL HR: Performed by: OBSTETRICS & GYNECOLOGY

## 2019-09-10 PROCEDURE — 36000707: Performed by: OBSTETRICS & GYNECOLOGY

## 2019-09-10 PROCEDURE — 37000008 HC ANESTHESIA 1ST 15 MINUTES: Performed by: OBSTETRICS & GYNECOLOGY

## 2019-09-10 RX ORDER — SODIUM CHLORIDE, SODIUM LACTATE, POTASSIUM CHLORIDE, CALCIUM CHLORIDE 600; 310; 30; 20 MG/100ML; MG/100ML; MG/100ML; MG/100ML
INJECTION, SOLUTION INTRAVENOUS CONTINUOUS PRN
Status: DISCONTINUED | OUTPATIENT
Start: 2019-09-10 | End: 2019-09-10

## 2019-09-10 RX ORDER — FENTANYL CITRATE 50 UG/ML
25 INJECTION, SOLUTION INTRAMUSCULAR; INTRAVENOUS EVERY 5 MIN PRN
Status: DISCONTINUED | OUTPATIENT
Start: 2019-09-10 | End: 2019-09-10 | Stop reason: HOSPADM

## 2019-09-10 RX ORDER — FENTANYL CITRATE 50 UG/ML
INJECTION, SOLUTION INTRAMUSCULAR; INTRAVENOUS
Status: DISCONTINUED | OUTPATIENT
Start: 2019-09-10 | End: 2019-09-10

## 2019-09-10 RX ORDER — SODIUM CHLORIDE 0.9 % (FLUSH) 0.9 %
10 SYRINGE (ML) INJECTION
Status: DISCONTINUED | OUTPATIENT
Start: 2019-09-10 | End: 2019-09-10 | Stop reason: HOSPADM

## 2019-09-10 RX ORDER — CHLORHEXIDINE GLUCONATE ORAL RINSE 1.2 MG/ML
10 SOLUTION DENTAL
Status: DISCONTINUED | OUTPATIENT
Start: 2019-09-10 | End: 2019-09-10 | Stop reason: HOSPADM

## 2019-09-10 RX ORDER — ACETAMINOPHEN 10 MG/ML
1000 INJECTION, SOLUTION INTRAVENOUS ONCE
Status: DISCONTINUED | OUTPATIENT
Start: 2019-09-10 | End: 2019-09-10 | Stop reason: HOSPADM

## 2019-09-10 RX ORDER — ONDANSETRON 2 MG/ML
INJECTION INTRAMUSCULAR; INTRAVENOUS
Status: DISCONTINUED | OUTPATIENT
Start: 2019-09-10 | End: 2019-09-10

## 2019-09-10 RX ORDER — ONDANSETRON 2 MG/ML
4 INJECTION INTRAMUSCULAR; INTRAVENOUS DAILY PRN
Status: DISCONTINUED | OUTPATIENT
Start: 2019-09-10 | End: 2019-09-10 | Stop reason: HOSPADM

## 2019-09-10 RX ORDER — BUPIVACAINE HYDROCHLORIDE 7.5 MG/ML
INJECTION, SOLUTION EPIDURAL; RETROBULBAR
Status: COMPLETED | OUTPATIENT
Start: 2019-09-10 | End: 2019-09-10

## 2019-09-10 RX ADMIN — BUPIVACAINE HYDROCHLORIDE 1.4 ML: 7.5 INJECTION, SOLUTION EPIDURAL; RETROBULBAR at 07:09

## 2019-09-10 RX ADMIN — ONDANSETRON 4 MG: 2 INJECTION, SOLUTION INTRAMUSCULAR; INTRAVENOUS at 07:09

## 2019-09-10 RX ADMIN — SODIUM CHLORIDE, SODIUM LACTATE, POTASSIUM CHLORIDE, AND CALCIUM CHLORIDE: 600; 310; 30; 20 INJECTION, SOLUTION INTRAVENOUS at 07:09

## 2019-09-10 RX ADMIN — FENTANYL CITRATE 10 MCG: 50 INJECTION, SOLUTION INTRAMUSCULAR; INTRAVENOUS at 07:09

## 2019-09-10 RX ADMIN — SODIUM CHLORIDE, SODIUM LACTATE, POTASSIUM CHLORIDE, AND CALCIUM CHLORIDE: 600; 310; 30; 20 INJECTION, SOLUTION INTRAVENOUS at 06:09

## 2019-09-10 RX ADMIN — FENTANYL CITRATE 15 MCG: 50 INJECTION, SOLUTION INTRAMUSCULAR; INTRAVENOUS at 07:09

## 2019-09-10 NOTE — OP NOTE
DATE OF PROCEDURE 9/10/2019    PREOP DIAGNOSIS:   1. Intrauterine pregnancy at 14 weeks 6 days  2. History of cervical incompetence    POSTOP DIAGNOSIS:  1. Intrauterine pregnancy at 14 weeks 6 days  2. History of cervical incompetence    PROCEDURE:  1. Catina Cerclage    SURGEON:  Vandana Smith MD    ASSISTANT:  Shalonda Mae MS III    ANESTHESIA:  spinal    COMPLICATIONS:  None    EBL:  Minimal    FLUIDS:  300mL    UOP:  Red rubber     FINDINGS:  Cervix, non-dilated, patulous    INDICATIONS:  Malgorzata Yoder is a 34 y.o. female  presents for a history indicated cerclage.  Consents have been signed and reviewed.  Questions have been answered.    PROCEDURE:  Patient was taken to the operating room where spinal anesthesia was administered and found to be adequate.  She was prepped and draped in normal sterile fashion.  A weighted sterile speculum was placed.  The anterior lip of the cervix was grasped with a ring forcep.  Using mersilene tape, a suture was placed in a circumferential, purse-string fashion around the cervix being careful to avoid the bladder and the rectum.  Once the cerclage was placed, the suture was cinched down and suture was tied at 12 o'clock.      All instruments were removed from the vagina.  Sponge, lap, needle and instrument count was correct x 2.  Patient was taken to the recovery room in stable condition.

## 2019-09-10 NOTE — H&P
[facility]  Obstetrics  History & Physical    Patient Name: Malgorzata Yoder  MRN: [id number]  Admission Date: 9/10/2019  Primary Care Provider: Sofia Abdul MD    Subjective:     Principal Problem: incompetent cervix    History of Present Illness:  Malgorzata Yoder 34 y.o.  with IUP 14w6d & h/o incompetent cervix requiring rescue cerclage    Obstetric HPI:  Patient reports no vaginal bleeding/SROM.    This pregnancy has been complicated by h/o incompetent cervix requiring rescue cerclage last pregnancy    OB History    Para Term  AB Living   2 1 0 1 0 1   SAB TAB Ectopic Multiple Live Births   0 0 0 0 0      # Outcome Date GA Lbr Tre/2nd Weight Sex Delivery Anes PTL Lv   2 Current            1  12 36w0d  0.907 kg (2 lb) F CS-Unspec EPI Y       Complications: Hypertension affecting pregnancy, Pre-eclampsia     Past Medical History:   Diagnosis Date    Asthma     childhood    History of ovarian cyst 2010    Hypertension      Past Surgical History:   Procedure Laterality Date    CARPAL TUNNEL RELEASE Bilateral      SECTION      MOUTH SURGERY      OOPHORECTOMY Right 2010    RELEASE-CARPAL TUNNEL-BILATERAL Bilateral 2015    Performed by [facility] OR    TENOSYNOVECTOMY Bilateral 2015    Performed by [facility] at [facility] OR       [facility] Medications   Medication Sig    labetalol (NORMODYNE) 100 MG tablet Take 1 tablet (100 mg total) by mouth 2 (two) times daily.    prenatal vit calc,iron,folic (PRENATAL VITAMIN ORAL) Take by mouth.    ergocalciferol (ERGOCALCIFEROL) 50,000 unit Cap Take 1 capsule (50,000 Units total) by mouth every 7 days.    potassium chloride (KLOR-CON) 20 mEq Pack Take 20 mEq by mouth 2 (two) times daily.       Review of patient's allergies indicates:  No Known Allergies     Family History     Problem Relation (Age of Onset)    Aneurysm Mother    Breast cancer Maternal Grandmother     Cancer Maternal Aunt        Tobacco Use    Smoking status: Never Smoker    Smokeless tobacco: Never Used   Substance and Sexual Activity    Alcohol use: No    Drug use: No    Sexual activity: Yes     Partners: Male     Birth control/protection: None     Review of Systems   All other systems reviewed and are negative.     Objective:     Vital Signs (Most Recent):  Temp: 98.1 °F (36.7 °C) (09/10/19 0604)  Pulse: 75 (09/10/19 0604)  Resp: 18 (09/10/19 0604)  BP: (!) 140/75 (09/10/19 0604)  SpO2: 100 % (09/10/19 0604) Vital Signs (24h Range):  Temp:  [98.1 °F (36.7 °C)] 98.1 °F (36.7 °C)  Pulse:  [75] 75  Resp:  [18] 18  SpO2:  [100 %] 100 %  BP: (140)/(75) 140/75     Weight: 112.2 kg (247 lb 5.7 oz)  Body mass index is 41.16 kg/m².    FHT: 140     Physical Exam:   Constitutional: She is oriented to person, place, and time. She appears well-developed and well-nourished.        Pulmonary/Chest: Effort normal.        Abdominal: Soft. She exhibits no distension. There is no tenderness.     Genitourinary:   Genitourinary Comments: deferred           Musculoskeletal: Normal range of motion and moves all extremeties.       Neurological: She is alert and oriented to person, place, and time.    Skin: Skin is warm and dry.    Psychiatric: She has a normal mood and affect. Her behavior is normal.     Significant Labs:  Lab Results   Component Value Date    GROUPTRH A NEG 2019    HEPBSAG Negative 2019       I have personallly reviewed all pertinent lab results from the last 24 hours.    Assessment/Plan:     34 y.o. female  at 14w6d for:    H/O incompetent cervix, currently pregnant, second trimester  Rescue cerclage last pregnancy.    Hx of preeclampsia, prior pregnancy, currently pregnant         History of  delivery, currently pregnant         Hypokalemia        to OR for prophylactic cerclage    Vandana Smith MD  Obstetrics  [facility]

## 2019-09-10 NOTE — TRANSFER OF CARE
"Anesthesia Transfer of Care Note    Patient: Malgorzata Yoder    Procedure(s) Performed: Procedure(s) (LRB):  CERCLAGE, CERVIX (N/A)    Patient location: PACU    Anesthesia Type: spinal    Transport from OR: Transported from OR on room air with adequate spontaneous ventilation    Post pain: adequate analgesia    Post assessment: no apparent anesthetic complications    Post vital signs: stable    Level of consciousness: awake, alert and oriented    Nausea/Vomiting: no nausea/vomiting    Complications: none    Transfer of care protocol was followed      Last vitals:   Visit Vitals  BP (!) 140/75 (BP Location: Right arm, Patient Position: Sitting)   Pulse 75   Temp 36.7 °C (98.1 °F) (Temporal)   Resp 18   Ht 5' 5" (1.651 m)   Wt 112.2 kg (247 lb 5.7 oz)   LMP 05/29/2019 (Exact Date)   SpO2 100%   Breastfeeding? No   BMI 41.16 kg/m²     "

## 2019-09-10 NOTE — PLAN OF CARE
Pt resting on stretcher, eating breakfast. Pt still with fine motor movement to bilat le. Pt unable to bend knees.

## 2019-09-10 NOTE — DISCHARGE SUMMARY
[facility]   Obstetrics  Discharge Summary      Patient Name: Malgorzata Yoder  MRN: [id number]  Admission Date: 9/10/2019  Hospital Length of Stay: 0 days  Discharge Date and Time: 9/10/19  Attending Physician: Vandana Smith MD   Discharging Provider: Vandana Smith MD   Primary Care Provider: Sofia Abdul MD    HPI: Malgorzata Yoder 34 y.o.  with IUP 14w6d & h/o incompetent cervix requiring rescue cerclage    FHT: 140s pre-op   Procedure(s) (LRB):  CERCLAGE, CERVIX (N/A)     Hospital Course:   Pt presents for prophylactic cerclage. Pt underwent Dominique cerclage without complications. She was discharged home in stable condition       Final Active Diagnoses:    Diagnosis Date Noted POA    PRINCIPAL PROBLEM:  Cervical cerclage suture present in second trimester [O34.32] 09/10/2019 No    H/O incompetent cervix, currently pregnant, second trimester [O09.292] 09/10/2019 Not Applicable      Problems Resolved During this Admission:        Labs: All labs within the past 24 hours have been reviewed    Immunizations     None          This patient has no babies on file.  Pending Diagnostic Studies:     None          Discharged Condition: good    Disposition: Home or Self Care    Follow Up:  Follow-up Information     Vandana Smith MD On 2019.    Specialties:  Obstetrics, Obstetrics and Gynecology  Why:  10am [facility]  Contact information:  [id number]  [place] [address]  [phone]                 Patient Instructions:   No discharge procedures on file.  Medications:  Current Discharge Medication List      CONTINUE these medications which have NOT CHANGED    Details   labetalol (NORMODYNE) 100 MG tablet Take 1 tablet (100 mg total) by mouth 2 (two) times daily.  Qty: 60 tablet, Refills: 11      prenatal vit calc,iron,folic (PRENATAL VITAMIN ORAL) Take by mouth.         STOP taking these medications       ergocalciferol (ERGOCALCIFEROL) 50,000 unit Cap Comments:    Reason for Stopping:         potassium chloride (KLOR-CON) 20 mEq Pack Comments:   Reason for Stopping:               Vandana Smith MD  Obstetrics  [facility]

## 2019-09-10 NOTE — ANESTHESIA POSTPROCEDURE EVALUATION
Anesthesia Post Evaluation    Patient: Malgorzata Yoder    Procedure(s) Performed: Procedure(s) (LRB):  CERCLAGE, CERVIX (N/A)    Final Anesthesia Type: spinal  Patient location during evaluation: PACU  Patient participation: Yes- Able to Participate  Level of consciousness: awake and alert  Post-procedure vital signs: reviewed and stable  Pain management: adequate  Airway patency: patent  PONV status at discharge: No PONV  Anesthetic complications: no      Cardiovascular status: hemodynamically stable  Respiratory status: spontaneous ventilation  Hydration status: euvolemic  Follow-up not needed.          Vitals Value Taken Time   /82 9/10/2019  8:21 AM   Temp 36.7 °C (98 °F) 9/10/2019  7:43 AM   Pulse 60 9/10/2019  8:21 AM   Resp 14 9/10/2019  8:21 AM   SpO2 99 % 9/10/2019  8:21 AM   Vitals shown include unvalidated device data.      No case tracking events are documented in the log.      Pain/Elba Score: Elba Score: 9 (9/10/2019  8:15 AM)

## 2019-09-10 NOTE — TELEPHONE ENCOUNTER
----- Message from Vandana Smith MD sent at 9/10/2019  7:36 AM CDT -----  Post op cerclage 9/26 @ 10am. Cancel her appt w/ jan on 9/25

## 2019-09-10 NOTE — SUBJECTIVE & OBJECTIVE
Obstetric HPI:  Patient reports no vaginal bleeding/SROM.    This pregnancy has been complicated by h/o incompetent cervix requiring rescue cerclage last pregnancy    OB History    Para Term  AB Living   2 1 0 1 0 1   SAB TAB Ectopic Multiple Live Births   0 0 0 0 0      # Outcome Date GA Lbr Tre/2nd Weight Sex Delivery Anes PTL Lv   2 Current            1  12 36w0d  0.907 kg (2 lb) F CS-Unspec EPI Y       Complications: Hypertension affecting pregnancy, Pre-eclampsia     Past Medical History:   Diagnosis Date    Asthma     childhood    History of ovarian cyst     Hypertension      Past Surgical History:   Procedure Laterality Date    CARPAL TUNNEL RELEASE Bilateral      SECTION      MOUTH SURGERY      OOPHORECTOMY Right 2010    RELEASE-CARPAL TUNNEL-BILATERAL Bilateral 2015    [facility] OR    TENOSYNOVECTOMY Bilateral 2015    Performed by [facility] at [facility] OR       PTA Medications   Medication Sig    labetalol (NORMODYNE) 100 MG tablet Take 1 tablet (100 mg total) by mouth 2 (two) times daily.    prenatal vit calc,iron,folic (PRENATAL VITAMIN ORAL) Take by mouth.    ergocalciferol (ERGOCALCIFEROL) 50,000 unit Cap Take 1 capsule (50,000 Units total) by mouth every 7 days.    potassium chloride (KLOR-CON) 20 mEq Pack Take 20 mEq by mouth 2 (two) times daily.       Review of patient's allergies indicates:  No Known Allergies     Family History     Problem Relation (Age of Onset)    Aneurysm Mother    Breast cancer Maternal Grandmother    Cancer Maternal Aunt        Tobacco Use    Smoking status: Never Smoker    Smokeless tobacco: Never Used   Substance and Sexual Activity    Alcohol use: No    Drug use: No    Sexual activity: Yes     Partners: Male     Birth control/protection: None     Review of Systems   All other systems reviewed and are negative.     Objective:     Vital Signs (Most Recent):  Temp: 98.1  °F (36.7 °C) (09/10/19 0604)  Pulse: 75 (09/10/19 0604)  Resp: 18 (09/10/19 0604)  BP: (!) 140/75 (09/10/19 0604)  SpO2: 100 % (09/10/19 0604) Vital Signs (24h Range):  Temp:  [98.1 °F (36.7 °C)] 98.1 °F (36.7 °C)  Pulse:  [75] 75  Resp:  [18] 18  SpO2:  [100 %] 100 %  BP: (140)/(75) 140/75     Weight: 112.2 kg (247 lb 5.7 oz)  Body mass index is 41.16 kg/m².    FHT: 140     Physical Exam:   Constitutional: She is oriented to person, place, and time. She appears well-developed and well-nourished.        Pulmonary/Chest: Effort normal.        Abdominal: Soft. She exhibits no distension. There is no tenderness.     Genitourinary:   Genitourinary Comments: deferred           Musculoskeletal: Normal range of motion and moves all extremeties.       Neurological: She is alert and oriented to person, place, and time.    Skin: Skin is warm and dry.    Psychiatric: She has a normal mood and affect. Her behavior is normal.     Significant Labs:  Lab Results   Component Value Date    GROUPTRH A NEG 07/02/2019    HEPBSAG Negative 07/02/2019       I have personallly reviewed all pertinent lab results from the last 24 hours.

## 2019-09-10 NOTE — PLAN OF CARE
Pt resting on stretcher, denies pain at present. VSS. Pt striving to meet d/c criteria once spinal anesthesia begins to wear off more. Will cont to monitor. See flow sheet for detailed assessment.

## 2019-09-10 NOTE — TELEPHONE ENCOUNTER
----- Message from Vandana Smith MD sent at 9/10/2019  6:59 AM CDT -----  Can you see if you can get rescue cerclage records from last pregnancy? They told ciaran they didn't have records for her but she obviously had cs w/ them.

## 2019-09-10 NOTE — PLAN OF CARE
"Pt now has gross motor movement to diandra le but states "my feet feel very heavy". Informed pt we can wait a bit longer, verbalized agreement.  "

## 2019-09-10 NOTE — ANESTHESIA PREPROCEDURE EVALUATION
09/10/2019  Malgorzata Yoder is a 34 y.o., female.    Anesthesia Evaluation    I have reviewed the Patient Summary Reports.    I have reviewed the Nursing Notes.   I have reviewed the Medications.     Review of Systems  Anesthesia Hx:  No problems with previous Anesthesia    Social:  Non-Smoker, No Alcohol Use    Cardiovascular:   Hypertension    Pulmonary:   Asthma mild and asymptomatic    Renal/:  Renal/ Normal     Hepatic/GI:  Hepatic/GI Normal    Endocrine:  Endocrine Normal        Physical Exam  General:  Obesity    Airway/Jaw/Neck:  Airway Findings: Mouth Opening: Normal Tongue: Normal  General Airway Assessment: Adult, Average  Mallampati: III  Improves to II with phonation.  TM Distance: Normal, at least 6 cm      Dental:  Dental Findings: In tact   Chest/Lungs:  Chest/Lungs Findings: Clear to auscultation     Heart/Vascular:  Heart Findings: Rate: Normal        Mental Status:  Mental Status Findings:  Cooperative         Anesthesia Plan  Type of Anesthesia, risks & benefits discussed:  Anesthesia Type:  general, spinal  Patient's Preference:   Intra-op Monitoring Plan: standard ASA monitors  Intra-op Monitoring Plan Comments:   Post Op Pain Control Plan: multimodal analgesia and per primary service following discharge from PACU  Post Op Pain Control Plan Comments:   Induction:   IV  Beta Blocker:  Patient is on a Beta-Blocker and has received one dose within the past 24 hours (No further documentation required).       Informed Consent: Patient understands risks and agrees with Anesthesia plan.  Questions answered. Anesthesia consent signed with patient.  ASA Score: 2     Day of Surgery Review of History & Physical:  There are no significant changes.          Ready For Surgery From Anesthesia Perspective.     Patient Active Problem List   Diagnosis    Hypertension    Hypokalemia    Carpal  tunnel syndrome on both sides    Severe obesity with body mass index (BMI) of 35.0 to 39.9 with serious comorbidity    Paresthesia and pain of both upper extremities    History of  delivery, currently pregnant    Hx of preeclampsia, prior pregnancy, currently pregnant    History of cerclage, currently pregnant    Asthma       Chemistry        Component Value Date/Time     2019    K 3.7 2019     2019 09    CO2 23 2019    BUN 7 2019    CREATININE 0.9 2019    GLU 89 2019 09        Component Value Date/Time    CALCIUM 9.2 2019    ALKPHOS 56 2019    AST 14 2019    ALT 15 2019    BILITOT 0.4 2019    ESTGFRAFRICA >60.0 2019    EGFRNONAA >60.0 2019        Lab Results   Component Value Date    WBC 7.70 2019    HGB 11.7 (L) 2019    HCT 35.5 (L) 2019    MCV 92 2019     2019

## 2019-09-10 NOTE — ANESTHESIA PROCEDURE NOTES
Spinal    Diagnosis: anesthesia  Patient location during procedure: OR  Start time: 9/10/2019 7:05 AM  Timeout: 9/10/2019 7:00 AM  End time: 9/10/2019 7:15 AM    Staffing  Authorizing Provider: Hector Holloway II, MD  Performing Provider: Tessa Alvarez CRNA    Preanesthetic Checklist  Completed: patient identified, site marked, surgical consent, pre-op evaluation, timeout performed, IV checked, risks and benefits discussed and monitors and equipment checked  Spinal Block  Patient position: sitting  Prep: Betadine  Patient monitoring: heart rate, continuous pulse ox and frequent blood pressure checks  Approach: midline  Location: L4-5  Injection technique: single shot  CSF Fluid: clear free-flowing CSF  Needle  Needle type: pencan.   Needle gauge: 25 G  Needle length: 3.5 in  Needle localization: anatomical landmarks  Assessment  Sensory level: T11   Dermatomal levels determined by alcohol wipe  Ease of block: easy  Patient's tolerance of the procedure: comfortable throughout block and no complaints  Additional Notes  Fentanyl 10mcg given SAB

## 2019-09-10 NOTE — TELEPHONE ENCOUNTER
I spoke with Estella at [facility] medical records and they don't have any records of the patient having a cerclage done there. Did the patient say whether it was done some where else?

## 2019-09-10 NOTE — TELEPHONE ENCOUNTER
I re-faxed the medical release to [facility] to get the op report from her Cerclage.

## 2019-09-26 ENCOUNTER — ROUTINE PRENATAL (OUTPATIENT)
Dept: OBSTETRICS AND GYNECOLOGY | Facility: CLINIC | Age: 34
End: 2019-09-26
Payer: COMMERCIAL

## 2019-09-26 VITALS
BODY MASS INDEX: 41.05 KG/M2 | DIASTOLIC BLOOD PRESSURE: 82 MMHG | SYSTOLIC BLOOD PRESSURE: 124 MMHG | WEIGHT: 246.69 LBS

## 2019-09-26 DIAGNOSIS — Z3A.17 PREGNANCY WITH 17 COMPLETED WEEKS GESTATION: Primary | ICD-10-CM

## 2019-09-26 DIAGNOSIS — Z36.89 ENCOUNTER FOR FETAL ANATOMIC SURVEY: ICD-10-CM

## 2019-09-26 PROCEDURE — 90686 IIV4 VACC NO PRSV 0.5 ML IM: CPT | Mod: S$GLB,,, | Performed by: OBSTETRICS & GYNECOLOGY

## 2019-09-26 PROCEDURE — 90471 IMMUNIZATION ADMIN: CPT | Mod: S$GLB,,, | Performed by: OBSTETRICS & GYNECOLOGY

## 2019-09-26 PROCEDURE — 90471 FLU VACCINE (QUAD) GREATER THAN OR EQUAL TO 3YO PRESERVATIVE FREE IM: ICD-10-PCS | Mod: S$GLB,,, | Performed by: OBSTETRICS & GYNECOLOGY

## 2019-09-26 PROCEDURE — 0502F PR SUBSEQUENT PRENATAL CARE: ICD-10-PCS | Mod: CPTII,S$GLB,, | Performed by: OBSTETRICS & GYNECOLOGY

## 2019-09-26 PROCEDURE — 99999 PR PBB SHADOW E&M-EST. PATIENT-LVL II: ICD-10-PCS | Mod: PBBFAC,,, | Performed by: OBSTETRICS & GYNECOLOGY

## 2019-09-26 PROCEDURE — 0502F SUBSEQUENT PRENATAL CARE: CPT | Mod: CPTII,S$GLB,, | Performed by: OBSTETRICS & GYNECOLOGY

## 2019-09-26 PROCEDURE — 99999 PR PBB SHADOW E&M-EST. PATIENT-LVL II: CPT | Mod: PBBFAC,,, | Performed by: OBSTETRICS & GYNECOLOGY

## 2019-09-26 PROCEDURE — 90686 FLU VACCINE (QUAD) GREATER THAN OR EQUAL TO 3YO PRESERVATIVE FREE IM: ICD-10-PCS | Mod: S$GLB,,, | Performed by: OBSTETRICS & GYNECOLOGY

## 2019-09-26 NOTE — NURSING
Used two patient identifiers. Verified allergies. Flu Quad IM injection given to left deltoid; patient tolerated well. Asked patient to remain in clinic for 15 minutes to be monitored for adverse reaction. Patient verbalized understanding.

## 2019-09-26 NOTE — PROGRESS NOTES
Reports swelling in hands & feet at work. Reports had spotting as well 1 day. Normal exam today. rec pt to continue being active & working. Cerclage stitch in place, normal cervix, normal discharge. RTC 3 wk w/ visit & anatomy scan. Flu vaccine today  Confirmed that had last cerclage at [facility] they have no record of pt; confirmed same name spelling

## 2019-10-02 ENCOUNTER — TELEPHONE (OUTPATIENT)
Dept: OBSTETRICS AND GYNECOLOGY | Facility: CLINIC | Age: 34
End: 2019-10-02

## 2019-10-02 NOTE — TELEPHONE ENCOUNTER
Informed patient that she will need to come by and fill out the last sheet of her FMLA as well as sign it.  Patient states that she will come in tomorrow 10/13/19.

## 2019-10-17 ENCOUNTER — ROUTINE PRENATAL (OUTPATIENT)
Dept: OBSTETRICS AND GYNECOLOGY | Facility: CLINIC | Age: 34
End: 2019-10-17
Payer: COMMERCIAL

## 2019-10-17 ENCOUNTER — PROCEDURE VISIT (OUTPATIENT)
Dept: OBSTETRICS AND GYNECOLOGY | Facility: CLINIC | Age: 34
End: 2019-10-17
Payer: COMMERCIAL

## 2019-10-17 VITALS
SYSTOLIC BLOOD PRESSURE: 108 MMHG | WEIGHT: 250.88 LBS | DIASTOLIC BLOOD PRESSURE: 80 MMHG | BODY MASS INDEX: 41.75 KG/M2

## 2019-10-17 DIAGNOSIS — I10 ESSENTIAL HYPERTENSION: ICD-10-CM

## 2019-10-17 DIAGNOSIS — N88.3 CERVICAL INCOMPETENCE: ICD-10-CM

## 2019-10-17 DIAGNOSIS — Z3A.20 PREGNANCY WITH 20 COMPLETED WEEKS GESTATION: Primary | ICD-10-CM

## 2019-10-17 DIAGNOSIS — Z36.89 ENCOUNTER FOR FETAL ANATOMIC SURVEY: ICD-10-CM

## 2019-10-17 PROCEDURE — 76805 OB US >/= 14 WKS SNGL FETUS: CPT | Mod: S$GLB,,, | Performed by: OBSTETRICS & GYNECOLOGY

## 2019-10-17 PROCEDURE — 0502F PR SUBSEQUENT PRENATAL CARE: ICD-10-PCS | Mod: CPTII,S$GLB,, | Performed by: OBSTETRICS & GYNECOLOGY

## 2019-10-17 PROCEDURE — 76805 PR US, OB 14+WKS, TRANSABD, SINGLE GESTATION: ICD-10-PCS | Mod: S$GLB,,, | Performed by: OBSTETRICS & GYNECOLOGY

## 2019-10-17 PROCEDURE — 0502F SUBSEQUENT PRENATAL CARE: CPT | Mod: CPTII,S$GLB,, | Performed by: OBSTETRICS & GYNECOLOGY

## 2019-10-17 PROCEDURE — 99999 PR PBB SHADOW E&M-EST. PATIENT-LVL II: ICD-10-PCS | Mod: PBBFAC,,, | Performed by: OBSTETRICS & GYNECOLOGY

## 2019-10-17 PROCEDURE — 99999 PR PBB SHADOW E&M-EST. PATIENT-LVL II: CPT | Mod: PBBFAC,,, | Performed by: OBSTETRICS & GYNECOLOGY

## 2019-10-17 NOTE — PROGRESS NOTES
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 Anatomy normal except subopt nose/extremities. Repeat in 4 wk w/ next visit.

## 2019-10-22 DIAGNOSIS — I10 ESSENTIAL HYPERTENSION: ICD-10-CM

## 2019-10-22 RX ORDER — LISINOPRIL AND HYDROCHLOROTHIAZIDE 20; 25 MG/1; MG/1
TABLET ORAL
Qty: 90 TABLET | Refills: 0 | OUTPATIENT
Start: 2019-10-22

## 2019-11-07 ENCOUNTER — TELEPHONE (OUTPATIENT)
Dept: OBSTETRICS AND GYNECOLOGY | Facility: CLINIC | Age: 34
End: 2019-11-07

## 2019-11-07 NOTE — TELEPHONE ENCOUNTER
Attempted to contact patient.  No answer. Left message to call back. Patient's work accommodation form is complete on our end, just need employee to fill out some information on her end.

## 2019-11-11 ENCOUNTER — TELEPHONE (OUTPATIENT)
Dept: OBSTETRICS AND GYNECOLOGY | Facility: CLINIC | Age: 34
End: 2019-11-11

## 2019-11-11 NOTE — TELEPHONE ENCOUNTER
----- Message from Farhana Aranda sent at 11/11/2019 10:20 AM CST -----  Contact: Patient  Patient needs to nurse regarding her accomodation, would not be more specific. Please call her back at [phone]. Thank you

## 2019-11-11 NOTE — TELEPHONE ENCOUNTER
Informed patient that she needs to fill out her information on the accommodation paperwork.  Patient coming to [place] location to fill out the paperwork.

## 2019-11-13 ENCOUNTER — PROCEDURE VISIT (OUTPATIENT)
Dept: OBSTETRICS AND GYNECOLOGY | Facility: CLINIC | Age: 34
End: 2019-11-13
Payer: COMMERCIAL

## 2019-11-13 ENCOUNTER — ROUTINE PRENATAL (OUTPATIENT)
Dept: OBSTETRICS AND GYNECOLOGY | Facility: CLINIC | Age: 34
End: 2019-11-13
Payer: COMMERCIAL

## 2019-11-13 VITALS — WEIGHT: 253.5 LBS | BODY MASS INDEX: 42.19 KG/M2 | SYSTOLIC BLOOD PRESSURE: 134 MMHG | DIASTOLIC BLOOD PRESSURE: 84 MMHG

## 2019-11-13 DIAGNOSIS — Z3A.24 PREGNANCY WITH 24 COMPLETED WEEKS GESTATION: ICD-10-CM

## 2019-11-13 DIAGNOSIS — Z98.891 H/O: C-SECTION: ICD-10-CM

## 2019-11-13 PROCEDURE — 76816 PR  US,PREGNANT UTERUS,F/U,TRANSABD APP: ICD-10-PCS | Mod: S$GLB,,, | Performed by: OBSTETRICS & GYNECOLOGY

## 2019-11-13 PROCEDURE — 0502F SUBSEQUENT PRENATAL CARE: CPT | Mod: CPTII,S$GLB,, | Performed by: OBSTETRICS & GYNECOLOGY

## 2019-11-13 PROCEDURE — 99999 PR PBB SHADOW E&M-EST. PATIENT-LVL II: CPT | Mod: PBBFAC,,, | Performed by: OBSTETRICS & GYNECOLOGY

## 2019-11-13 PROCEDURE — 76816 OB US FOLLOW-UP PER FETUS: CPT | Mod: S$GLB,,, | Performed by: OBSTETRICS & GYNECOLOGY

## 2019-11-13 PROCEDURE — 0502F PR SUBSEQUENT PRENATAL CARE: ICD-10-PCS | Mod: CPTII,S$GLB,, | Performed by: OBSTETRICS & GYNECOLOGY

## 2019-11-13 PROCEDURE — 99999 PR PBB SHADOW E&M-EST. PATIENT-LVL II: ICD-10-PCS | Mod: PBBFAC,,, | Performed by: OBSTETRICS & GYNECOLOGY

## 2019-11-22 ENCOUNTER — TELEPHONE (OUTPATIENT)
Dept: OBSTETRICS AND GYNECOLOGY | Facility: CLINIC | Age: 34
End: 2019-11-22

## 2019-11-22 NOTE — TELEPHONE ENCOUNTER
----- Message from Evita Aquino sent at 11/22/2019  1:04 PM CST -----  Contact: [employer]   Requesting a call back at  [phone]  for information on a form sent over for the patient.

## 2019-11-22 NOTE — TELEPHONE ENCOUNTER
Spoke to Perry with [employer], and he needed to clarify that the information on the form was correct, and done by our clinic.  Informed Perry that the  filled out a portion of the form, and I finished the rest, as well as the patient filled in her demographic information with verbal understanding.  Perry also needed more information as to why the patient cannot operate heavy equipement.  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

## 2019-12-06 ENCOUNTER — TELEPHONE (OUTPATIENT)
Dept: OBSTETRICS AND GYNECOLOGY | Facility: CLINIC | Age: 34
End: 2019-12-06

## 2019-12-06 NOTE — TELEPHONE ENCOUNTER
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 At this gestational age, she should be able to operate most

## 2019-12-06 NOTE — TELEPHONE ENCOUNTER
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  Right now patient states that she is loosing hours, as she cannot lift or operate the fork-lift.  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

## 2019-12-06 NOTE — TELEPHONE ENCOUNTER
Patient states that [employer] is making her redo the accommodation paperwork we originally did, as we need to be more specific about what she can and cannot do.  Should patient be operating heavy machinery?  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  Please advise on what her limitations are?  Patient stated that if she cannot operate the machinery, she will have to transfer out of her department or go out on short term disability.

## 2019-12-09 ENCOUNTER — TELEPHONE (OUTPATIENT)
Dept: OBSTETRICS AND GYNECOLOGY | Facility: CLINIC | Age: 34
End: 2019-12-09

## 2019-12-09 NOTE — TELEPHONE ENCOUNTER
----- Message from Gissel Soto sent at 12/9/2019 11:03 AM CST -----  Contact: pt  Type:  Patient Returning Call    Who Called:Patient  Who Left Message for Patient:nurse  Does the patient know what this is regarding?:nurse  Would the patient rather a call back or a response via [facility]? Call back  Best Call Back Number:[phone]  Additional Information: na

## 2019-12-12 ENCOUNTER — LAB VISIT (OUTPATIENT)
Dept: LAB | Facility: HOSPITAL | Age: 34
End: 2019-12-12
Attending: OBSTETRICS & GYNECOLOGY
Payer: COMMERCIAL

## 2019-12-12 ENCOUNTER — ROUTINE PRENATAL (OUTPATIENT)
Dept: OBSTETRICS AND GYNECOLOGY | Facility: CLINIC | Age: 34
End: 2019-12-12
Payer: COMMERCIAL

## 2019-12-12 VITALS
SYSTOLIC BLOOD PRESSURE: 130 MMHG | DIASTOLIC BLOOD PRESSURE: 84 MMHG | BODY MASS INDEX: 41.49 KG/M2 | WEIGHT: 249.31 LBS

## 2019-12-12 DIAGNOSIS — Z67.91 RH NEGATIVE STATUS DURING PREGNANCY IN THIRD TRIMESTER: ICD-10-CM

## 2019-12-12 DIAGNOSIS — Z3A.28 PREGNANCY WITH 28 COMPLETED WEEKS GESTATION: ICD-10-CM

## 2019-12-12 DIAGNOSIS — Z98.891 H/O: C-SECTION: Primary | ICD-10-CM

## 2019-12-12 DIAGNOSIS — O26.893 RH NEGATIVE STATUS DURING PREGNANCY IN THIRD TRIMESTER: ICD-10-CM

## 2019-12-12 DIAGNOSIS — O10.013 ESSENTIAL HYPERTENSION AFFECTING PREGNANCY IN THIRD TRIMESTER: ICD-10-CM

## 2019-12-12 DIAGNOSIS — Z98.891 H/O: C-SECTION: ICD-10-CM

## 2019-12-12 LAB
ABO + RH BLD: NORMAL
BASOPHILS # BLD AUTO: 0.02 K/UL (ref 0–0.2)
BASOPHILS NFR BLD: 0.4 % (ref 0–1.9)
BLD GP AB SCN CELLS X3 SERPL QL: NORMAL
DIFFERENTIAL METHOD: ABNORMAL
EOSINOPHIL # BLD AUTO: 0.1 K/UL (ref 0–0.5)
EOSINOPHIL NFR BLD: 2.4 % (ref 0–8)
ERYTHROCYTE [DISTWIDTH] IN BLOOD BY AUTOMATED COUNT: 13.8 % (ref 11.5–14.5)
GLUCOSE SERPL-MCNC: 108 MG/DL (ref 70–140)
HCT VFR BLD AUTO: 36.5 % (ref 37–48.5)
HGB BLD-MCNC: 11.7 G/DL (ref 12–16)
IMM GRANULOCYTES # BLD AUTO: 0.03 K/UL (ref 0–0.04)
IMM GRANULOCYTES NFR BLD AUTO: 0.6 % (ref 0–0.5)
LYMPHOCYTES # BLD AUTO: 1.4 K/UL (ref 1–4.8)
LYMPHOCYTES NFR BLD: 26.1 % (ref 18–48)
MCH RBC QN AUTO: 30.5 PG (ref 27–31)
MCHC RBC AUTO-ENTMCNC: 32.1 G/DL (ref 32–36)
MCV RBC AUTO: 95 FL (ref 82–98)
MONOCYTES # BLD AUTO: 0.4 K/UL (ref 0.3–1)
MONOCYTES NFR BLD: 7.1 % (ref 4–15)
NEUTROPHILS # BLD AUTO: 3.4 K/UL (ref 1.8–7.7)
NEUTROPHILS NFR BLD: 63.4 % (ref 38–73)
NRBC BLD-RTO: 0 /100 WBC
PLATELET # BLD AUTO: 190 K/UL (ref 150–350)
PMV BLD AUTO: 11.8 FL (ref 9.2–12.9)
RBC # BLD AUTO: 3.83 M/UL (ref 4–5.4)
WBC # BLD AUTO: 5.37 K/UL (ref 3.9–12.7)

## 2019-12-12 PROCEDURE — 85025 COMPLETE CBC W/AUTO DIFF WBC: CPT

## 2019-12-12 PROCEDURE — 99999 PR PBB SHADOW E&M-EST. PATIENT-LVL II: ICD-10-PCS | Mod: PBBFAC,,, | Performed by: OBSTETRICS & GYNECOLOGY

## 2019-12-12 PROCEDURE — 90715 TDAP VACCINE 7 YRS/> IM: CPT | Mod: S$GLB,,, | Performed by: OBSTETRICS & GYNECOLOGY

## 2019-12-12 PROCEDURE — 36415 COLL VENOUS BLD VENIPUNCTURE: CPT

## 2019-12-12 PROCEDURE — 86901 BLOOD TYPING SEROLOGIC RH(D): CPT

## 2019-12-12 PROCEDURE — 99999 PR PBB SHADOW E&M-EST. PATIENT-LVL II: CPT | Mod: PBBFAC,,, | Performed by: OBSTETRICS & GYNECOLOGY

## 2019-12-12 PROCEDURE — 86592 SYPHILIS TEST NON-TREP QUAL: CPT

## 2019-12-12 PROCEDURE — 96372 THER/PROPH/DIAG INJ SC/IM: CPT | Mod: 59,S$GLB,, | Performed by: OBSTETRICS & GYNECOLOGY

## 2019-12-12 PROCEDURE — 96372 RHO (D) IMMUNE GLOBULIN: ICD-10-PCS | Mod: 59,S$GLB,, | Performed by: OBSTETRICS & GYNECOLOGY

## 2019-12-12 PROCEDURE — 82950 GLUCOSE TEST: CPT

## 2019-12-12 PROCEDURE — 90715 TDAP VACCINE GREATER THAN OR EQUAL TO 7YO IM: ICD-10-PCS | Mod: S$GLB,,, | Performed by: OBSTETRICS & GYNECOLOGY

## 2019-12-12 PROCEDURE — 90471 IMMUNIZATION ADMIN: CPT | Mod: S$GLB,,, | Performed by: OBSTETRICS & GYNECOLOGY

## 2019-12-12 PROCEDURE — 0502F SUBSEQUENT PRENATAL CARE: CPT | Mod: CPTII,S$GLB,, | Performed by: OBSTETRICS & GYNECOLOGY

## 2019-12-12 PROCEDURE — 90471 TDAP VACCINE GREATER THAN OR EQUAL TO 7YO IM: ICD-10-PCS | Mod: S$GLB,,, | Performed by: OBSTETRICS & GYNECOLOGY

## 2019-12-12 PROCEDURE — 86703 HIV-1/HIV-2 1 RESULT ANTBDY: CPT

## 2019-12-12 PROCEDURE — 0502F PR SUBSEQUENT PRENATAL CARE: ICD-10-PCS | Mod: CPTII,S$GLB,, | Performed by: OBSTETRICS & GYNECOLOGY

## 2019-12-12 NOTE — NURSING
Verified pt with two identifiers name and . Allergies and medications reviewed. TDAP administered IM to left deltoid and Rhogam 300 mcg administered to left ventrogluteal while using aseptic technique. No discomfort noted. Pt tolerated well. Advised pt to wait 15 minutes in the lobby to monitor for side effects. Pt verbalized understanding.

## 2019-12-12 NOTE — PROGRESS NOTES
Work limitations paper refilled. No longer wants . Desires repeat csection-will plan for 39w. RTC 2 weeks.   tdap & rhogam today w/ DM labs

## 2019-12-13 LAB
HIV 1+2 AB+HIV1 P24 AG SERPL QL IA: NEGATIVE
RPR SER QL: NORMAL

## 2019-12-26 ENCOUNTER — ROUTINE PRENATAL (OUTPATIENT)
Dept: OBSTETRICS AND GYNECOLOGY | Facility: CLINIC | Age: 34
End: 2019-12-26
Payer: COMMERCIAL

## 2019-12-26 VITALS
BODY MASS INDEX: 41.79 KG/M2 | DIASTOLIC BLOOD PRESSURE: 76 MMHG | SYSTOLIC BLOOD PRESSURE: 122 MMHG | WEIGHT: 251.13 LBS

## 2019-12-26 DIAGNOSIS — Z3A.30 PREGNANCY WITH 30 COMPLETED WEEKS GESTATION: Primary | ICD-10-CM

## 2019-12-26 DIAGNOSIS — I10 ESSENTIAL HYPERTENSION: ICD-10-CM

## 2019-12-26 DIAGNOSIS — Z98.891 H/O: C-SECTION: ICD-10-CM

## 2019-12-26 PROCEDURE — 0502F PR SUBSEQUENT PRENATAL CARE: ICD-10-PCS | Mod: CPTII,S$GLB,, | Performed by: OBSTETRICS & GYNECOLOGY

## 2019-12-26 PROCEDURE — 99999 PR PBB SHADOW E&M-EST. PATIENT-LVL II: ICD-10-PCS | Mod: PBBFAC,,, | Performed by: OBSTETRICS & GYNECOLOGY

## 2019-12-26 PROCEDURE — 0502F SUBSEQUENT PRENATAL CARE: CPT | Mod: CPTII,S$GLB,, | Performed by: OBSTETRICS & GYNECOLOGY

## 2019-12-26 PROCEDURE — 99999 PR PBB SHADOW E&M-EST. PATIENT-LVL II: CPT | Mod: PBBFAC,,, | Performed by: OBSTETRICS & GYNECOLOGY

## 2020-01-08 ENCOUNTER — LAB VISIT (OUTPATIENT)
Dept: LAB | Facility: HOSPITAL | Age: 35
End: 2020-01-08
Attending: OBSTETRICS & GYNECOLOGY
Payer: COMMERCIAL

## 2020-01-08 ENCOUNTER — PROCEDURE VISIT (OUTPATIENT)
Dept: OBSTETRICS AND GYNECOLOGY | Facility: CLINIC | Age: 35
End: 2020-01-08
Payer: COMMERCIAL

## 2020-01-08 ENCOUNTER — ROUTINE PRENATAL (OUTPATIENT)
Dept: OBSTETRICS AND GYNECOLOGY | Facility: CLINIC | Age: 35
End: 2020-01-08
Payer: COMMERCIAL

## 2020-01-08 VITALS
DIASTOLIC BLOOD PRESSURE: 98 MMHG | BODY MASS INDEX: 41.82 KG/M2 | WEIGHT: 251.31 LBS | SYSTOLIC BLOOD PRESSURE: 152 MMHG

## 2020-01-08 DIAGNOSIS — O16.3 ELEVATED BLOOD PRESSURE COMPLICATING PREGNANCY IN THIRD TRIMESTER, ANTEPARTUM: ICD-10-CM

## 2020-01-08 DIAGNOSIS — Z3A.32 PREGNANCY WITH 32 COMPLETED WEEKS GESTATION: ICD-10-CM

## 2020-01-08 DIAGNOSIS — I10 ESSENTIAL HYPERTENSION: ICD-10-CM

## 2020-01-08 DIAGNOSIS — Z3A.32 PREGNANCY WITH 32 COMPLETED WEEKS GESTATION: Primary | ICD-10-CM

## 2020-01-08 DIAGNOSIS — O10.013 ESSENTIAL HYPERTENSION AFFECTING PREGNANCY IN THIRD TRIMESTER: ICD-10-CM

## 2020-01-08 LAB
ALBUMIN SERPL BCP-MCNC: 3.1 G/DL (ref 3.5–5.2)
ALP SERPL-CCNC: 63 U/L (ref 55–135)
ALT SERPL W/O P-5'-P-CCNC: 13 U/L (ref 10–44)
ANION GAP SERPL CALC-SCNC: 9 MMOL/L (ref 8–16)
AST SERPL-CCNC: 14 U/L (ref 10–40)
BASOPHILS # BLD AUTO: 0.03 K/UL (ref 0–0.2)
BASOPHILS NFR BLD: 0.5 % (ref 0–1.9)
BILIRUB SERPL-MCNC: 0.3 MG/DL (ref 0.1–1)
BUN SERPL-MCNC: 5 MG/DL (ref 6–20)
CALCIUM SERPL-MCNC: 8.7 MG/DL (ref 8.7–10.5)
CHLORIDE SERPL-SCNC: 105 MMOL/L (ref 95–110)
CO2 SERPL-SCNC: 22 MMOL/L (ref 23–29)
CREAT SERPL-MCNC: 0.8 MG/DL (ref 0.5–1.4)
CREAT UR-MCNC: 199 MG/DL (ref 15–325)
DIFFERENTIAL METHOD: ABNORMAL
EOSINOPHIL # BLD AUTO: 0.2 K/UL (ref 0–0.5)
EOSINOPHIL NFR BLD: 3.1 % (ref 0–8)
ERYTHROCYTE [DISTWIDTH] IN BLOOD BY AUTOMATED COUNT: 13.7 % (ref 11.5–14.5)
EST. GFR  (AFRICAN AMERICAN): >60 ML/MIN/1.73 M^2
EST. GFR  (NON AFRICAN AMERICAN): >60 ML/MIN/1.73 M^2
GLUCOSE SERPL-MCNC: 80 MG/DL (ref 70–110)
HCT VFR BLD AUTO: 33.1 % (ref 37–48.5)
HGB BLD-MCNC: 10.9 G/DL (ref 12–16)
IMM GRANULOCYTES # BLD AUTO: 0.03 K/UL (ref 0–0.04)
IMM GRANULOCYTES NFR BLD AUTO: 0.5 % (ref 0–0.5)
LYMPHOCYTES # BLD AUTO: 1.7 K/UL (ref 1–4.8)
LYMPHOCYTES NFR BLD: 30.3 % (ref 18–48)
MCH RBC QN AUTO: 31.3 PG (ref 27–31)
MCHC RBC AUTO-ENTMCNC: 32.9 G/DL (ref 32–36)
MCV RBC AUTO: 95 FL (ref 82–98)
MONOCYTES # BLD AUTO: 0.5 K/UL (ref 0.3–1)
MONOCYTES NFR BLD: 8.4 % (ref 4–15)
NEUTROPHILS # BLD AUTO: 3.1 K/UL (ref 1.8–7.7)
NEUTROPHILS NFR BLD: 57.2 % (ref 38–73)
NRBC BLD-RTO: 0 /100 WBC
PLATELET # BLD AUTO: 168 K/UL (ref 150–350)
PMV BLD AUTO: 12.4 FL (ref 9.2–12.9)
POTASSIUM SERPL-SCNC: 3.4 MMOL/L (ref 3.5–5.1)
PROT SERPL-MCNC: 6.9 G/DL (ref 6–8.4)
PROT UR-MCNC: 25 MG/DL (ref 0–15)
PROT/CREAT UR: 0.13 MG/G{CREAT} (ref 0–0.2)
RBC # BLD AUTO: 3.48 M/UL (ref 4–5.4)
SODIUM SERPL-SCNC: 136 MMOL/L (ref 136–145)
WBC # BLD AUTO: 5.47 K/UL (ref 3.9–12.7)

## 2020-01-08 PROCEDURE — 0502F PR SUBSEQUENT PRENATAL CARE: ICD-10-PCS | Mod: CPTII,S$GLB,, | Performed by: OBSTETRICS & GYNECOLOGY

## 2020-01-08 PROCEDURE — 76816 OB US FOLLOW-UP PER FETUS: CPT | Mod: 59,S$GLB,, | Performed by: OBSTETRICS & GYNECOLOGY

## 2020-01-08 PROCEDURE — 76816 PR  US,PREGNANT UTERUS,F/U,TRANSABD APP: ICD-10-PCS | Mod: 59,S$GLB,, | Performed by: OBSTETRICS & GYNECOLOGY

## 2020-01-08 PROCEDURE — 99999 PR PBB SHADOW E&M-EST. PATIENT-LVL II: ICD-10-PCS | Mod: PBBFAC,,, | Performed by: OBSTETRICS & GYNECOLOGY

## 2020-01-08 PROCEDURE — 80053 COMPREHEN METABOLIC PANEL: CPT

## 2020-01-08 PROCEDURE — 84156 ASSAY OF PROTEIN URINE: CPT

## 2020-01-08 PROCEDURE — 76819 FETAL BIOPHYS PROFIL W/O NST: CPT | Mod: 59,S$GLB,, | Performed by: OBSTETRICS & GYNECOLOGY

## 2020-01-08 PROCEDURE — 36415 COLL VENOUS BLD VENIPUNCTURE: CPT

## 2020-01-08 PROCEDURE — 99999 PR PBB SHADOW E&M-EST. PATIENT-LVL II: CPT | Mod: PBBFAC,,, | Performed by: OBSTETRICS & GYNECOLOGY

## 2020-01-08 PROCEDURE — 76819 PR US, OB, FETAL BIOPHYSICAL, W/O NST: ICD-10-PCS | Mod: 59,S$GLB,, | Performed by: OBSTETRICS & GYNECOLOGY

## 2020-01-08 PROCEDURE — 0502F SUBSEQUENT PRENATAL CARE: CPT | Mod: CPTII,S$GLB,, | Performed by: OBSTETRICS & GYNECOLOGY

## 2020-01-08 PROCEDURE — 85025 COMPLETE CBC W/AUTO DIFF WBC: CPT

## 2020-01-08 NOTE — PROGRESS NOTES
Elevated BPs today, denies symptoms. Does not have BP monitor at home despite h/o CHTN. Recommend pt to obtain. preE labs today. DTRs 1+ upper & lower extremities. Urine dip 250 blood, trace protein. U/S today AGA, vertex, BPP 8/8. Orders for serial u/s placed. RTC 2 weeks

## 2020-01-10 ENCOUNTER — PATIENT MESSAGE (OUTPATIENT)
Dept: OBSTETRICS AND GYNECOLOGY | Facility: CLINIC | Age: 35
End: 2020-01-10

## 2020-01-15 ENCOUNTER — LAB VISIT (OUTPATIENT)
Dept: LAB | Facility: HOSPITAL | Age: 35
End: 2020-01-15
Attending: OBSTETRICS & GYNECOLOGY
Payer: COMMERCIAL

## 2020-01-15 ENCOUNTER — LAB VISIT (OUTPATIENT)
Dept: LAB | Facility: HOSPITAL | Age: 35
End: 2020-01-15
Payer: COMMERCIAL

## 2020-01-15 ENCOUNTER — PROCEDURE VISIT (OUTPATIENT)
Dept: OBSTETRICS AND GYNECOLOGY | Facility: CLINIC | Age: 35
End: 2020-01-15
Payer: COMMERCIAL

## 2020-01-15 DIAGNOSIS — Z3A.32 PREGNANCY WITH 32 COMPLETED WEEKS GESTATION: ICD-10-CM

## 2020-01-15 DIAGNOSIS — O14.93 PRE-ECLAMPSIA IN THIRD TRIMESTER: ICD-10-CM

## 2020-01-15 DIAGNOSIS — O14.93 PRE-ECLAMPSIA IN THIRD TRIMESTER: Primary | ICD-10-CM

## 2020-01-15 DIAGNOSIS — I10 ESSENTIAL HYPERTENSION: ICD-10-CM

## 2020-01-15 DIAGNOSIS — O16.3 ELEVATED BLOOD PRESSURE COMPLICATING PREGNANCY IN THIRD TRIMESTER, ANTEPARTUM: ICD-10-CM

## 2020-01-15 LAB
ALBUMIN SERPL BCP-MCNC: 3.2 G/DL (ref 3.5–5.2)
ALP SERPL-CCNC: 69 U/L (ref 55–135)
ALT SERPL W/O P-5'-P-CCNC: 19 U/L (ref 10–44)
ANION GAP SERPL CALC-SCNC: 8 MMOL/L (ref 8–16)
AST SERPL-CCNC: 18 U/L (ref 10–40)
BASOPHILS # BLD AUTO: 0.02 K/UL (ref 0–0.2)
BASOPHILS NFR BLD: 0.3 % (ref 0–1.9)
BILIRUB SERPL-MCNC: 0.3 MG/DL (ref 0.1–1)
BUN SERPL-MCNC: 4 MG/DL (ref 6–20)
CALCIUM SERPL-MCNC: 9 MG/DL (ref 8.7–10.5)
CHLORIDE SERPL-SCNC: 104 MMOL/L (ref 95–110)
CO2 SERPL-SCNC: 25 MMOL/L (ref 23–29)
CREAT SERPL-MCNC: 0.7 MG/DL (ref 0.5–1.4)
DIFFERENTIAL METHOD: ABNORMAL
EOSINOPHIL # BLD AUTO: 0.2 K/UL (ref 0–0.5)
EOSINOPHIL NFR BLD: 2.3 % (ref 0–8)
ERYTHROCYTE [DISTWIDTH] IN BLOOD BY AUTOMATED COUNT: 14 % (ref 11.5–14.5)
EST. GFR  (AFRICAN AMERICAN): >60 ML/MIN/1.73 M^2
EST. GFR  (NON AFRICAN AMERICAN): >60 ML/MIN/1.73 M^2
GLUCOSE SERPL-MCNC: 76 MG/DL (ref 70–110)
HCT VFR BLD AUTO: 35.1 % (ref 37–48.5)
HGB BLD-MCNC: 11.3 G/DL (ref 12–16)
IMM GRANULOCYTES # BLD AUTO: 0.04 K/UL (ref 0–0.04)
IMM GRANULOCYTES NFR BLD AUTO: 0.6 % (ref 0–0.5)
LYMPHOCYTES # BLD AUTO: 1.5 K/UL (ref 1–4.8)
LYMPHOCYTES NFR BLD: 23.1 % (ref 18–48)
MCH RBC QN AUTO: 30.7 PG (ref 27–31)
MCHC RBC AUTO-ENTMCNC: 32.2 G/DL (ref 32–36)
MCV RBC AUTO: 95 FL (ref 82–98)
MONOCYTES # BLD AUTO: 0.6 K/UL (ref 0.3–1)
MONOCYTES NFR BLD: 8.5 % (ref 4–15)
NEUTROPHILS # BLD AUTO: 4.3 K/UL (ref 1.8–7.7)
NEUTROPHILS NFR BLD: 65.2 % (ref 38–73)
NRBC BLD-RTO: 0 /100 WBC
PLATELET # BLD AUTO: 161 K/UL (ref 150–350)
PMV BLD AUTO: 12.2 FL (ref 9.2–12.9)
POTASSIUM SERPL-SCNC: 3.5 MMOL/L (ref 3.5–5.1)
PROT SERPL-MCNC: 7.1 G/DL (ref 6–8.4)
RBC # BLD AUTO: 3.68 M/UL (ref 4–5.4)
SODIUM SERPL-SCNC: 137 MMOL/L (ref 136–145)
WBC # BLD AUTO: 6.62 K/UL (ref 3.9–12.7)

## 2020-01-15 PROCEDURE — 84156 ASSAY OF PROTEIN URINE: CPT

## 2020-01-15 PROCEDURE — 36415 COLL VENOUS BLD VENIPUNCTURE: CPT

## 2020-01-15 PROCEDURE — 76819 FETAL BIOPHYS PROFIL W/O NST: CPT | Mod: S$GLB,,, | Performed by: OBSTETRICS & GYNECOLOGY

## 2020-01-15 PROCEDURE — 85025 COMPLETE CBC W/AUTO DIFF WBC: CPT

## 2020-01-15 PROCEDURE — 76819 PR US, OB, FETAL BIOPHYSICAL, W/O NST: ICD-10-PCS | Mod: S$GLB,,, | Performed by: OBSTETRICS & GYNECOLOGY

## 2020-01-15 PROCEDURE — 80053 COMPREHEN METABOLIC PANEL: CPT

## 2020-01-15 RX ORDER — LABETALOL 100 MG/1
200 TABLET, FILM COATED ORAL 2 TIMES DAILY
Qty: 120 TABLET | Refills: 11 | Status: ON HOLD | OUTPATIENT
Start: 2020-01-15 | End: 2020-02-22 | Stop reason: HOSPADM

## 2020-01-15 NOTE — PROGRESS NOTES
Pt presents for u/s. Did not take her BP today. After taking labetalol, BP still 170/110s. preE labs 1 week ago normal. Overall asymptomatic. rec increasing labetalol to 200mg bid & repeat preE labs today

## 2020-01-16 ENCOUNTER — PATIENT MESSAGE (OUTPATIENT)
Dept: GYNECOLOGY | Facility: HOSPITAL | Age: 35
End: 2020-01-16

## 2020-01-16 LAB
CREAT UR-MCNC: 49 MG/DL (ref 15–325)
PROT UR-MCNC: <7 MG/DL (ref 0–15)
PROT/CREAT UR: NORMAL MG/G{CREAT} (ref 0–0.2)

## 2020-01-22 ENCOUNTER — ROUTINE PRENATAL (OUTPATIENT)
Dept: OBSTETRICS AND GYNECOLOGY | Facility: CLINIC | Age: 35
End: 2020-01-22
Payer: COMMERCIAL

## 2020-01-22 ENCOUNTER — TELEPHONE (OUTPATIENT)
Dept: OBSTETRICS AND GYNECOLOGY | Facility: CLINIC | Age: 35
End: 2020-01-22

## 2020-01-22 ENCOUNTER — PROCEDURE VISIT (OUTPATIENT)
Dept: OBSTETRICS AND GYNECOLOGY | Facility: CLINIC | Age: 35
End: 2020-01-22
Payer: COMMERCIAL

## 2020-01-22 VITALS
SYSTOLIC BLOOD PRESSURE: 146 MMHG | DIASTOLIC BLOOD PRESSURE: 90 MMHG | WEIGHT: 250.44 LBS | BODY MASS INDEX: 41.68 KG/M2

## 2020-01-22 DIAGNOSIS — O10.013 ESSENTIAL HYPERTENSION AFFECTING PREGNANCY IN THIRD TRIMESTER: Primary | ICD-10-CM

## 2020-01-22 DIAGNOSIS — N30.00 ACUTE CYSTITIS WITHOUT HEMATURIA: ICD-10-CM

## 2020-01-22 DIAGNOSIS — O34.33 INCOMPETENT CERVIX IN PREGNANCY, ANTEPARTUM, THIRD TRIMESTER: ICD-10-CM

## 2020-01-22 DIAGNOSIS — Z98.891 H/O: C-SECTION: Primary | ICD-10-CM

## 2020-01-22 DIAGNOSIS — Z3A.34 PREGNANCY WITH 34 COMPLETED WEEKS GESTATION: ICD-10-CM

## 2020-01-22 DIAGNOSIS — Z98.891 H/O: C-SECTION: ICD-10-CM

## 2020-01-22 DIAGNOSIS — I10 ESSENTIAL HYPERTENSION: ICD-10-CM

## 2020-01-22 DIAGNOSIS — O16.3 HYPERTENSION AFFECTING PREGNANCY IN THIRD TRIMESTER: ICD-10-CM

## 2020-01-22 LAB
BACTERIA #/AREA URNS HPF: ABNORMAL /HPF
BILIRUB UR QL STRIP: NEGATIVE
CLARITY UR: ABNORMAL
COLOR UR: YELLOW
GLUCOSE UR QL STRIP: NEGATIVE
HGB UR QL STRIP: ABNORMAL
KETONES UR QL STRIP: ABNORMAL
LEUKOCYTE ESTERASE UR QL STRIP: ABNORMAL
MICROSCOPIC COMMENT: ABNORMAL
NITRITE UR QL STRIP: NEGATIVE
PH UR STRIP: 6 [PH] (ref 5–8)
PROT UR QL STRIP: NEGATIVE
RBC #/AREA URNS HPF: 2 /HPF (ref 0–4)
SP GR UR STRIP: 1.02 (ref 1–1.03)
SQUAMOUS #/AREA URNS HPF: 6 /HPF
URN SPEC COLLECT METH UR: ABNORMAL
WBC #/AREA URNS HPF: 50 /HPF (ref 0–5)

## 2020-01-22 PROCEDURE — 3008F PR BODY MASS INDEX (BMI) DOCUMENTED: ICD-10-PCS | Mod: CPTII,S$GLB,, | Performed by: OBSTETRICS & GYNECOLOGY

## 2020-01-22 PROCEDURE — 0502F PR SUBSEQUENT PRENATAL CARE: ICD-10-PCS | Mod: S$GLB,,, | Performed by: OBSTETRICS & GYNECOLOGY

## 2020-01-22 PROCEDURE — 81000 URINALYSIS NONAUTO W/SCOPE: CPT

## 2020-01-22 PROCEDURE — 99999 PR PBB SHADOW E&M-EST. PATIENT-LVL II: CPT | Mod: PBBFAC,,, | Performed by: OBSTETRICS & GYNECOLOGY

## 2020-01-22 PROCEDURE — 76819 PR US, OB, FETAL BIOPHYSICAL, W/O NST: ICD-10-PCS | Mod: S$GLB,,, | Performed by: OBSTETRICS & GYNECOLOGY

## 2020-01-22 PROCEDURE — 87086 URINE CULTURE/COLONY COUNT: CPT

## 2020-01-22 PROCEDURE — 3008F BODY MASS INDEX DOCD: CPT | Mod: CPTII,S$GLB,, | Performed by: OBSTETRICS & GYNECOLOGY

## 2020-01-22 PROCEDURE — 0502F SUBSEQUENT PRENATAL CARE: CPT | Mod: S$GLB,,, | Performed by: OBSTETRICS & GYNECOLOGY

## 2020-01-22 PROCEDURE — 99999 PR PBB SHADOW E&M-EST. PATIENT-LVL II: ICD-10-PCS | Mod: PBBFAC,,, | Performed by: OBSTETRICS & GYNECOLOGY

## 2020-01-22 PROCEDURE — 76819 FETAL BIOPHYS PROFIL W/O NST: CPT | Mod: S$GLB,,, | Performed by: OBSTETRICS & GYNECOLOGY

## 2020-01-22 NOTE — TELEPHONE ENCOUNTER
----- Message from Vandana Smith MD sent at 1/22/2020  1:37 PM CST -----  Doing her repeat cs 2/20 @ 12p: can you book me out from 11a on (last pt at 11a) & move Rosetta & Nella up

## 2020-01-23 LAB
BACTERIA UR CULT: NORMAL
BACTERIA UR CULT: NORMAL

## 2020-01-29 ENCOUNTER — PROCEDURE VISIT (OUTPATIENT)
Dept: OBSTETRICS AND GYNECOLOGY | Facility: CLINIC | Age: 35
End: 2020-01-29
Payer: COMMERCIAL

## 2020-01-29 DIAGNOSIS — O16.3 HYPERTENSION AFFECTING PREGNANCY IN THIRD TRIMESTER: ICD-10-CM

## 2020-01-29 DIAGNOSIS — I10 ESSENTIAL HYPERTENSION: ICD-10-CM

## 2020-01-29 PROCEDURE — 76819 FETAL BIOPHYS PROFIL W/O NST: CPT | Mod: 59,S$GLB,, | Performed by: OBSTETRICS & GYNECOLOGY

## 2020-01-29 PROCEDURE — 76816 PR  US,PREGNANT UTERUS,F/U,TRANSABD APP: ICD-10-PCS | Mod: 59,S$GLB,, | Performed by: OBSTETRICS & GYNECOLOGY

## 2020-01-29 PROCEDURE — 76816 OB US FOLLOW-UP PER FETUS: CPT | Mod: 59,S$GLB,, | Performed by: OBSTETRICS & GYNECOLOGY

## 2020-01-29 PROCEDURE — 76819 PR US, OB, FETAL BIOPHYSICAL, W/O NST: ICD-10-PCS | Mod: 59,S$GLB,, | Performed by: OBSTETRICS & GYNECOLOGY

## 2020-02-05 ENCOUNTER — PROCEDURE VISIT (OUTPATIENT)
Dept: OBSTETRICS AND GYNECOLOGY | Facility: CLINIC | Age: 35
End: 2020-02-05
Payer: COMMERCIAL

## 2020-02-05 VITALS
DIASTOLIC BLOOD PRESSURE: 82 MMHG | SYSTOLIC BLOOD PRESSURE: 140 MMHG | WEIGHT: 252.88 LBS | BODY MASS INDEX: 42.13 KG/M2 | HEIGHT: 65 IN

## 2020-02-05 DIAGNOSIS — Z98.891 H/O: C-SECTION: ICD-10-CM

## 2020-02-05 DIAGNOSIS — Z3A.36 PREGNANCY WITH 36 COMPLETED WEEKS GESTATION: Primary | ICD-10-CM

## 2020-02-05 DIAGNOSIS — I10 ESSENTIAL HYPERTENSION: ICD-10-CM

## 2020-02-05 DIAGNOSIS — O16.3 HYPERTENSION AFFECTING PREGNANCY IN THIRD TRIMESTER: ICD-10-CM

## 2020-02-05 DIAGNOSIS — O34.33 INCOMPETENT CERVIX IN PREGNANCY, ANTEPARTUM, THIRD TRIMESTER: ICD-10-CM

## 2020-02-05 PROCEDURE — 58999 UNLISTED PX FML GENITAL SYS: CPT | Mod: S$GLB,,, | Performed by: OBSTETRICS & GYNECOLOGY

## 2020-02-05 PROCEDURE — 76819 PR US, OB, FETAL BIOPHYSICAL, W/O NST: ICD-10-PCS | Mod: S$GLB,,, | Performed by: OBSTETRICS & GYNECOLOGY

## 2020-02-05 PROCEDURE — 76819 FETAL BIOPHYS PROFIL W/O NST: CPT | Mod: S$GLB,,, | Performed by: OBSTETRICS & GYNECOLOGY

## 2020-02-05 PROCEDURE — 58999: ICD-10-PCS | Mod: S$GLB,,, | Performed by: OBSTETRICS & GYNECOLOGY

## 2020-02-05 PROCEDURE — 87147 CULTURE TYPE IMMUNOLOGIC: CPT

## 2020-02-05 PROCEDURE — 87081 CULTURE SCREEN ONLY: CPT

## 2020-02-05 PROCEDURE — 87184 SC STD DISK METHOD PER PLATE: CPT

## 2020-02-05 NOTE — PROCEDURES
Procedures   Malgorzata Yoder 34 y.o.  with IUP 36w0d & h/o incompetent cervix & csection, presents for cerclage removal. BPP today , vertex    PROCEDURE:   Cerclage visible, knot at 12:00. Grasped w/ ring forceps, & transected on one side. Cerclage removed completely & intact.  Cervix /vertex, ballotable

## 2020-02-10 LAB — BACTERIA SPEC AEROBE CULT: ABNORMAL

## 2020-02-12 ENCOUNTER — PROCEDURE VISIT (OUTPATIENT)
Dept: OBSTETRICS AND GYNECOLOGY | Facility: CLINIC | Age: 35
End: 2020-02-12
Payer: COMMERCIAL

## 2020-02-12 DIAGNOSIS — O16.3 HYPERTENSION AFFECTING PREGNANCY IN THIRD TRIMESTER: ICD-10-CM

## 2020-02-12 DIAGNOSIS — I10 ESSENTIAL HYPERTENSION: ICD-10-CM

## 2020-02-12 PROCEDURE — 76819 FETAL BIOPHYS PROFIL W/O NST: CPT | Mod: S$GLB,,, | Performed by: OBSTETRICS & GYNECOLOGY

## 2020-02-12 PROCEDURE — 76819 PR US, OB, FETAL BIOPHYSICAL, W/O NST: ICD-10-PCS | Mod: S$GLB,,, | Performed by: OBSTETRICS & GYNECOLOGY

## 2020-02-20 ENCOUNTER — ANESTHESIA (OUTPATIENT)
Dept: OBSTETRICS AND GYNECOLOGY | Facility: HOSPITAL | Age: 35
End: 2020-02-20
Payer: COMMERCIAL

## 2020-02-20 ENCOUNTER — ANESTHESIA EVENT (OUTPATIENT)
Dept: OBSTETRICS AND GYNECOLOGY | Facility: HOSPITAL | Age: 35
End: 2020-02-20
Payer: COMMERCIAL

## 2020-02-20 ENCOUNTER — HOSPITAL ENCOUNTER (INPATIENT)
Facility: HOSPITAL | Age: 35
LOS: 2 days | Discharge: HOME | End: 2020-02-22
Attending: OBSTETRICS & GYNECOLOGY | Admitting: OBSTETRICS & GYNECOLOGY
Payer: COMMERCIAL

## 2020-02-20 ENCOUNTER — TELEPHONE (OUTPATIENT)
Dept: OBSTETRICS AND GYNECOLOGY | Facility: CLINIC | Age: 35
End: 2020-02-20

## 2020-02-20 DIAGNOSIS — O09.299 HISTORY OF CERCLAGE, CURRENTLY PREGNANT: ICD-10-CM

## 2020-02-20 DIAGNOSIS — Z98.891 STATUS POST REPEAT LOW TRANSVERSE CESAREAN SECTION: ICD-10-CM

## 2020-02-20 DIAGNOSIS — Z98.890 HISTORY OF CERCLAGE, CURRENTLY PREGNANT: ICD-10-CM

## 2020-02-20 DIAGNOSIS — I10 ESSENTIAL HYPERTENSION: Primary | ICD-10-CM

## 2020-02-20 DIAGNOSIS — O34.219 HISTORY OF CESAREAN DELIVERY, CURRENTLY PREGNANT: ICD-10-CM

## 2020-02-20 DIAGNOSIS — Z98.891 HISTORY OF C-SECTION: ICD-10-CM

## 2020-02-20 DIAGNOSIS — E66.01 SEVERE OBESITY WITH BODY MASS INDEX (BMI) OF 35.0 TO 39.9 WITH SERIOUS COMORBIDITY: ICD-10-CM

## 2020-02-20 LAB
ABO + RH BLD: NORMAL
BASOPHILS # BLD AUTO: 0.02 K/UL (ref 0–0.2)
BASOPHILS NFR BLD: 0.3 % (ref 0–1.9)
BLD GP AB SCN CELLS X3 SERPL QL: NORMAL
DIFFERENTIAL METHOD: ABNORMAL
EOSINOPHIL # BLD AUTO: 0.1 K/UL (ref 0–0.5)
EOSINOPHIL NFR BLD: 1.5 % (ref 0–8)
ERYTHROCYTE [DISTWIDTH] IN BLOOD BY AUTOMATED COUNT: 13.6 % (ref 11.5–14.5)
HCT VFR BLD AUTO: 34.7 % (ref 37–48.5)
HGB BLD-MCNC: 11.6 G/DL (ref 12–16)
IMM GRANULOCYTES # BLD AUTO: 0.04 K/UL (ref 0–0.04)
IMM GRANULOCYTES NFR BLD AUTO: 0.6 % (ref 0–0.5)
LYMPHOCYTES # BLD AUTO: 1.4 K/UL (ref 1–4.8)
LYMPHOCYTES NFR BLD: 21.6 % (ref 18–48)
MCH RBC QN AUTO: 31.2 PG (ref 27–31)
MCHC RBC AUTO-ENTMCNC: 33.4 G/DL (ref 32–36)
MCV RBC AUTO: 93 FL (ref 82–98)
MONOCYTES # BLD AUTO: 0.5 K/UL (ref 0.3–1)
MONOCYTES NFR BLD: 7.9 % (ref 4–15)
NEUTROPHILS # BLD AUTO: 4.6 K/UL (ref 1.8–7.7)
NEUTROPHILS NFR BLD: 68.1 % (ref 38–73)
NRBC BLD-RTO: 0 /100 WBC
PLATELET # BLD AUTO: 177 K/UL (ref 150–350)
PMV BLD AUTO: 11.6 FL (ref 9.2–12.9)
RBC # BLD AUTO: 3.72 M/UL (ref 4–5.4)
WBC # BLD AUTO: 6.68 K/UL (ref 3.9–12.7)

## 2020-02-20 PROCEDURE — 86901 BLOOD TYPING SEROLOGIC RH(D): CPT | Mod: 91

## 2020-02-20 PROCEDURE — 51702 INSERT TEMP BLADDER CATH: CPT

## 2020-02-20 PROCEDURE — 63600175 PHARM REV CODE 636 W HCPCS: Performed by: OBSTETRICS & GYNECOLOGY

## 2020-02-20 PROCEDURE — 25000003 PHARM REV CODE 250: Performed by: OBSTETRICS & GYNECOLOGY

## 2020-02-20 PROCEDURE — 59514 CESAREAN DELIVERY ONLY: CPT | Mod: AS,AT,, | Performed by: PHYSICIAN ASSISTANT

## 2020-02-20 PROCEDURE — 36415 COLL VENOUS BLD VENIPUNCTURE: CPT

## 2020-02-20 PROCEDURE — 63600175 PHARM REV CODE 636 W HCPCS: Performed by: ANESTHESIOLOGY

## 2020-02-20 PROCEDURE — 37000009 HC ANESTHESIA EA ADD 15 MINS: Performed by: OBSTETRICS & GYNECOLOGY

## 2020-02-20 PROCEDURE — S0020 INJECTION, BUPIVICAINE HYDRO: HCPCS | Performed by: ANESTHESIOLOGY

## 2020-02-20 PROCEDURE — 59510 CESAREAN DELIVERY: CPT | Mod: AT,,, | Performed by: OBSTETRICS & GYNECOLOGY

## 2020-02-20 PROCEDURE — 85461 HEMOGLOBIN FETAL: CPT

## 2020-02-20 PROCEDURE — 36000685 HC CESAREAN SECTION LEVEL I

## 2020-02-20 PROCEDURE — 63600175 PHARM REV CODE 636 W HCPCS: Performed by: NURSE ANESTHETIST, CERTIFIED REGISTERED

## 2020-02-20 PROCEDURE — 86850 RBC ANTIBODY SCREEN: CPT

## 2020-02-20 PROCEDURE — 59514 PR CESAREAN DELIVERY ONLY: ICD-10-PCS | Mod: AS,AT,, | Performed by: PHYSICIAN ASSISTANT

## 2020-02-20 PROCEDURE — 85025 COMPLETE CBC W/AUTO DIFF WBC: CPT

## 2020-02-20 PROCEDURE — 25000003 PHARM REV CODE 250: Performed by: ANESTHESIOLOGY

## 2020-02-20 PROCEDURE — 37000008 HC ANESTHESIA 1ST 15 MINUTES: Performed by: OBSTETRICS & GYNECOLOGY

## 2020-02-20 PROCEDURE — 11000001 HC ACUTE MED/SURG PRIVATE ROOM

## 2020-02-20 PROCEDURE — 59510 PR FULL ROUT OBSTE CARE,CESAREAN DELIV: ICD-10-PCS | Mod: AT,,, | Performed by: OBSTETRICS & GYNECOLOGY

## 2020-02-20 RX ORDER — CARBOPROST TROMETHAMINE 250 UG/ML
250 INJECTION, SOLUTION INTRAMUSCULAR
Status: DISCONTINUED | OUTPATIENT
Start: 2020-02-20 | End: 2020-02-22 | Stop reason: HOSPADM

## 2020-02-20 RX ORDER — CEFAZOLIN SODIUM 1 G/3ML
INJECTION, POWDER, FOR SOLUTION INTRAMUSCULAR; INTRAVENOUS
Status: DISCONTINUED | OUTPATIENT
Start: 2020-02-20 | End: 2020-02-20

## 2020-02-20 RX ORDER — BUPIVACAINE HYDROCHLORIDE 7.5 MG/ML
INJECTION, SOLUTION EPIDURAL; RETROBULBAR
Status: COMPLETED | OUTPATIENT
Start: 2020-02-20 | End: 2020-02-20

## 2020-02-20 RX ORDER — BISACODYL 10 MG
10 SUPPOSITORY, RECTAL RECTAL ONCE AS NEEDED
Status: DISCONTINUED | OUTPATIENT
Start: 2020-02-20 | End: 2020-02-22 | Stop reason: HOSPADM

## 2020-02-20 RX ORDER — KETOROLAC TROMETHAMINE 30 MG/ML
30 INJECTION, SOLUTION INTRAMUSCULAR; INTRAVENOUS EVERY 8 HOURS
Status: COMPLETED | OUTPATIENT
Start: 2020-02-20 | End: 2020-02-21

## 2020-02-20 RX ORDER — MISOPROSTOL 200 UG/1
800 TABLET ORAL
Status: DISCONTINUED | OUTPATIENT
Start: 2020-02-20 | End: 2020-02-22 | Stop reason: HOSPADM

## 2020-02-20 RX ORDER — HYDROCODONE BITARTRATE AND ACETAMINOPHEN 5; 325 MG/1; MG/1
1 TABLET ORAL EVERY 4 HOURS PRN
Status: DISCONTINUED | OUTPATIENT
Start: 2020-02-20 | End: 2020-02-22 | Stop reason: HOSPADM

## 2020-02-20 RX ORDER — OXYTOCIN/RINGER'S LACTATE 30/500 ML
41.65 PLASTIC BAG, INJECTION (ML) INTRAVENOUS CONTINUOUS
Status: DISPENSED | OUTPATIENT
Start: 2020-02-20 | End: 2020-02-21

## 2020-02-20 RX ORDER — KETOROLAC TROMETHAMINE 30 MG/ML
INJECTION, SOLUTION INTRAMUSCULAR; INTRAVENOUS
Status: DISCONTINUED | OUTPATIENT
Start: 2020-02-20 | End: 2020-02-20

## 2020-02-20 RX ORDER — SODIUM CITRATE AND CITRIC ACID MONOHYDRATE 334; 500 MG/5ML; MG/5ML
30 SOLUTION ORAL
Status: DISCONTINUED | OUTPATIENT
Start: 2020-02-20 | End: 2020-02-22 | Stop reason: HOSPADM

## 2020-02-20 RX ORDER — IBUPROFEN 800 MG/1
800 TABLET ORAL EVERY 8 HOURS
Status: DISCONTINUED | OUTPATIENT
Start: 2020-02-21 | End: 2020-02-22 | Stop reason: HOSPADM

## 2020-02-20 RX ORDER — SIMETHICONE 80 MG
1 TABLET,CHEWABLE ORAL EVERY 6 HOURS PRN
Status: DISCONTINUED | OUTPATIENT
Start: 2020-02-20 | End: 2020-02-22 | Stop reason: HOSPADM

## 2020-02-20 RX ORDER — CHLORHEXIDINE GLUCONATE ORAL RINSE 1.2 MG/ML
10 SOLUTION DENTAL
Status: DISCONTINUED | OUTPATIENT
Start: 2020-02-20 | End: 2020-02-22 | Stop reason: HOSPADM

## 2020-02-20 RX ORDER — SODIUM CHLORIDE, SODIUM LACTATE, POTASSIUM CHLORIDE, CALCIUM CHLORIDE 600; 310; 30; 20 MG/100ML; MG/100ML; MG/100ML; MG/100ML
INJECTION, SOLUTION INTRAVENOUS CONTINUOUS PRN
Status: DISCONTINUED | OUTPATIENT
Start: 2020-02-20 | End: 2020-02-20

## 2020-02-20 RX ORDER — LABETALOL 100 MG/1
100 TABLET, FILM COATED ORAL ONCE
Status: COMPLETED | OUTPATIENT
Start: 2020-02-20 | End: 2020-02-20

## 2020-02-20 RX ORDER — AMOXICILLIN 250 MG
1 CAPSULE ORAL NIGHTLY PRN
Status: DISCONTINUED | OUTPATIENT
Start: 2020-02-20 | End: 2020-02-22 | Stop reason: HOSPADM

## 2020-02-20 RX ORDER — LABETALOL 100 MG/1
100 TABLET, FILM COATED ORAL EVERY 12 HOURS
Status: DISCONTINUED | OUTPATIENT
Start: 2020-02-20 | End: 2020-02-21

## 2020-02-20 RX ORDER — OXYTOCIN/RINGER'S LACTATE 30/500 ML
334 PLASTIC BAG, INJECTION (ML) INTRAVENOUS ONCE
Status: COMPLETED | OUTPATIENT
Start: 2020-02-20 | End: 2020-02-20

## 2020-02-20 RX ORDER — ACETAMINOPHEN 325 MG/1
650 TABLET ORAL EVERY 6 HOURS PRN
Status: DISCONTINUED | OUTPATIENT
Start: 2020-02-20 | End: 2020-02-22 | Stop reason: HOSPADM

## 2020-02-20 RX ORDER — OXYTOCIN/RINGER'S LACTATE 30/500 ML
95 PLASTIC BAG, INJECTION (ML) INTRAVENOUS ONCE
Status: COMPLETED | OUTPATIENT
Start: 2020-02-20 | End: 2020-02-20

## 2020-02-20 RX ORDER — FAMOTIDINE 20 MG/50ML
20 INJECTION, SOLUTION INTRAVENOUS
Status: DISCONTINUED | OUTPATIENT
Start: 2020-02-20 | End: 2020-02-22 | Stop reason: HOSPADM

## 2020-02-20 RX ORDER — ADHESIVE BANDAGE
30 BANDAGE TOPICAL EVERY 6 HOURS PRN
Status: DISCONTINUED | OUTPATIENT
Start: 2020-02-21 | End: 2020-02-22 | Stop reason: HOSPADM

## 2020-02-20 RX ORDER — METHYLERGONOVINE MALEATE 0.2 MG/ML
200 INJECTION INTRAVENOUS
Status: DISCONTINUED | OUTPATIENT
Start: 2020-02-20 | End: 2020-02-22 | Stop reason: HOSPADM

## 2020-02-20 RX ORDER — HYDROCODONE BITARTRATE AND ACETAMINOPHEN 10; 325 MG/1; MG/1
1 TABLET ORAL EVERY 4 HOURS PRN
Status: DISCONTINUED | OUTPATIENT
Start: 2020-02-20 | End: 2020-02-22 | Stop reason: HOSPADM

## 2020-02-20 RX ORDER — DIPHENHYDRAMINE HYDROCHLORIDE 50 MG/ML
25 INJECTION INTRAMUSCULAR; INTRAVENOUS EVERY 4 HOURS PRN
Status: DISCONTINUED | OUTPATIENT
Start: 2020-02-20 | End: 2020-02-22 | Stop reason: HOSPADM

## 2020-02-20 RX ORDER — DIPHENHYDRAMINE HCL 25 MG
25 CAPSULE ORAL EVERY 4 HOURS PRN
Status: DISCONTINUED | OUTPATIENT
Start: 2020-02-20 | End: 2020-02-22 | Stop reason: HOSPADM

## 2020-02-20 RX ORDER — FAMOTIDINE 10 MG/ML
20 INJECTION INTRAVENOUS
Status: DISCONTINUED | OUTPATIENT
Start: 2020-02-20 | End: 2020-02-20

## 2020-02-20 RX ORDER — ONDANSETRON 2 MG/ML
INJECTION INTRAMUSCULAR; INTRAVENOUS
Status: DISCONTINUED | OUTPATIENT
Start: 2020-02-20 | End: 2020-02-20

## 2020-02-20 RX ORDER — CEFAZOLIN SODIUM 2 G/50ML
2 SOLUTION INTRAVENOUS
Status: DISCONTINUED | OUTPATIENT
Start: 2020-02-20 | End: 2020-02-22 | Stop reason: HOSPADM

## 2020-02-20 RX ORDER — SODIUM CHLORIDE, SODIUM LACTATE, POTASSIUM CHLORIDE, CALCIUM CHLORIDE 600; 310; 30; 20 MG/100ML; MG/100ML; MG/100ML; MG/100ML
INJECTION, SOLUTION INTRAVENOUS CONTINUOUS
Status: DISCONTINUED | OUTPATIENT
Start: 2020-02-20 | End: 2020-02-22 | Stop reason: HOSPADM

## 2020-02-20 RX ORDER — ONDANSETRON 8 MG/1
8 TABLET, ORALLY DISINTEGRATING ORAL EVERY 8 HOURS PRN
Status: DISCONTINUED | OUTPATIENT
Start: 2020-02-20 | End: 2020-02-22 | Stop reason: HOSPADM

## 2020-02-20 RX ORDER — MORPHINE SULFATE 1 MG/ML
INJECTION, SOLUTION EPIDURAL; INTRATHECAL; INTRAVENOUS
Status: COMPLETED | OUTPATIENT
Start: 2020-02-20 | End: 2020-02-20

## 2020-02-20 RX ORDER — CHLORHEXIDINE GLUCONATE ORAL RINSE 1.2 MG/ML
10 SOLUTION DENTAL 2 TIMES DAILY
Status: DISCONTINUED | OUTPATIENT
Start: 2020-02-20 | End: 2020-02-22 | Stop reason: HOSPADM

## 2020-02-20 RX ADMIN — SODIUM CHLORIDE, SODIUM LACTATE, POTASSIUM CHLORIDE, AND CALCIUM CHLORIDE 1000 ML: .6; .31; .03; .02 INJECTION, SOLUTION INTRAVENOUS at 01:02

## 2020-02-20 RX ADMIN — KETOROLAC TROMETHAMINE 30 MG: 30 INJECTION, SOLUTION INTRAMUSCULAR at 03:02

## 2020-02-20 RX ADMIN — SODIUM CHLORIDE, SODIUM LACTATE, POTASSIUM CHLORIDE, AND CALCIUM CHLORIDE: 600; 310; 30; 20 INJECTION, SOLUTION INTRAVENOUS at 02:02

## 2020-02-20 RX ADMIN — CEFAZOLIN 2 G: 1 INJECTION, POWDER, FOR SOLUTION INTRAMUSCULAR; INTRAVENOUS at 02:02

## 2020-02-20 RX ADMIN — ONDANSETRON 4 MG: 2 INJECTION, SOLUTION INTRAMUSCULAR; INTRAVENOUS at 02:02

## 2020-02-20 RX ADMIN — MORPHINE SULFATE 0.3 MG: 1 INJECTION, SOLUTION EPIDURAL; INTRATHECAL; INTRAVENOUS at 02:02

## 2020-02-20 RX ADMIN — LABETALOL HYDROCHLORIDE 100 MG: 100 TABLET, FILM COATED ORAL at 05:02

## 2020-02-20 RX ADMIN — PROMETHAZINE HYDROCHLORIDE 12.5 MG: 25 INJECTION INTRAMUSCULAR; INTRAVENOUS at 07:02

## 2020-02-20 RX ADMIN — KETOROLAC TROMETHAMINE 30 MG: 30 INJECTION, SOLUTION INTRAMUSCULAR; INTRAVENOUS at 10:02

## 2020-02-20 RX ADMIN — Medication 334 ML/HR: at 03:02

## 2020-02-20 RX ADMIN — SODIUM CHLORIDE, SODIUM LACTATE, POTASSIUM CHLORIDE, AND CALCIUM CHLORIDE: .6; .31; .03; .02 INJECTION, SOLUTION INTRAVENOUS at 02:02

## 2020-02-20 RX ADMIN — LABETALOL HYDROCHLORIDE 100 MG: 100 TABLET, FILM COATED ORAL at 09:02

## 2020-02-20 RX ADMIN — Medication 95 MILLI-UNITS/MIN: at 05:02

## 2020-02-20 RX ADMIN — CHLORHEXIDINE GLUCONATE 0.12% ORAL RINSE 10 ML: 1.2 LIQUID ORAL at 09:02

## 2020-02-20 RX ADMIN — BUPIVACAINE HYDROCHLORIDE 1.7 ML: 7.5 INJECTION, SOLUTION EPIDURAL; RETROBULBAR at 02:02

## 2020-02-20 NOTE — NURSING
Discussed feeding choice with mother.  Reviewed benefits of breastfeeding and risks of formula feeding. Mother states her intention is to exclusively breastfeed. Pt denies any problems with previous child with breastfeeding.

## 2020-02-20 NOTE — ANESTHESIA PROCEDURE NOTES
Spinal    Diagnosis: Repeat C/S  Patient location during procedure: OR  Start time: 2/20/2020 2:40 PM  Timeout: 2/20/2020 2:40 PM  End time: 2/20/2020 2:45 PM    Staffing  Authorizing Provider: Hector Holloway II, MD  Performing Provider: David Pritchett CRNA    Preanesthetic Checklist  Completed: patient identified, site marked, surgical consent, pre-op evaluation, timeout performed, IV checked, risks and benefits discussed and monitors and equipment checked  Spinal Block  Patient position: sitting  Prep: Betadine  Patient monitoring: cardiac monitor, heart rate, continuous pulse ox and frequent blood pressure checks  Approach: midline  Location: L3-4  Injection technique: single shot  CSF Fluid: clear free-flowing CSF  Needle  Needle type: pencil-tip   Needle gauge: 27 G  Needle length: 5 in  Additional Documentation: incremental injection, negative aspiration for heme and no paresthesia on injection  Needle localization: anatomical landmarks  Assessment  Sensory level: T6   Dermatomal levels determined by pinch or prick  Ease of block: easy  Patient's tolerance of the procedure: comfortable throughout block and no complaints          
0 = independent

## 2020-02-20 NOTE — TRANSFER OF CARE
"Anesthesia Transfer of Care Note    Patient: Malgorzata Yoder    Procedure(s) Performed: Procedure(s) (LRB):   SECTION (N/A)    Patient location: Labor and Delivery    Anesthesia Type: spinal    Transport from OR: Transported from OR on room air with adequate spontaneous ventilation    Post pain: adequate analgesia    Post assessment: no apparent anesthetic complications and tolerated procedure well    Post vital signs: stable    Level of consciousness: awake, alert and oriented    Nausea/Vomiting: no nausea/vomiting    Complications: none    Transfer of care protocol was followed      Last vitals:   Visit Vitals  BP (!) 163/89   Pulse 70   Temp 36.8 °C (98.3 °F) (Oral)   Resp 17   Ht 5' 5" (1.651 m)   Wt 114.7 kg (252 lb 13.9 oz)   LMP 2019 (Exact Date)   SpO2 100%   Breastfeeding? No   BMI 42.08 kg/m²     "

## 2020-02-20 NOTE — L&D DELIVERY NOTE
Section Procedure Note 2020    Pre-operative Diagnosis:   1. Previous  declines SONYA  2. IUP 38w1d   3. Chronic hypertension  4. History of cervical incompetence s/p cerclage placement & removal    Post-operative Diagnosis: same     PROCEDURE:   repeat low transverse  section     Surgeon: Vandana Smith MD     Assistants: JOSHUA Tanner (presence necessary for performance of case); Ace Butts, MS III    Anesthesia: Spinal anesthesia     IV Fluids: 1000mL    Estimated Blood Loss: 400mL     UOP: 200 mL     Specimens: none    Complications: None     Operative findings: viable male, vertex; normal bilateral tubes/ovaries    Procedure Details   The patient was seen in the Holding Room. The risks, benefits, complications, treatment options, and expected outcomes were discussed with the patient. The patient concurred with the proposed plan, giving informed consent. The patient was taken to Operating Room, identified as Malgorzata Yoder and the procedure verified as  Delivery.     After induction of anesthesia, the patient was draped and prepped in the usual sterile manner in the dorsal supine position. A Pfannenstiel incision was made and carried down through the subcutaneous tissue to the fascia. Fascial incision was made and extended transversely. The fascia was  from the underlying rectus tissue superiorly and inferiorly. The peritoneum was identified and entered bluntly then extended superiorly and inferiorly with good visualization of the underlying bladder.   The utero-vesical peritoneal reflection was adequately retracted from the lower uterine segment. A low transverse uterine incision was made. There was clear amniotic fluid noted. The fetal vertex was delivered atraumatically, bulb suctioned on the field, then fully delivered. Delayed cord clamping performed. The placenta was simply expressed and the uterine cavity was cleared of clots and debris. The  uterine incision was reapproximated with running locked sutures of #1 chromic.    Lavage was performed and hemostasis noted. The peritoneum and rectus muscles were re-approximated with 3-0 chromic. The fascia was then reapproximated with running sutures of #1 loop PDS. The skin was reapproximated with 4-0 monocryl in a subcuticular fashion. Instrument, sponge, and needle counts were correct prior the abdominal closure and at the conclusion of the case.     Disposition: to recovery PP room     Condition: stable         Delivery Information for Johnathan Yoder    Birth information:  YOB: 2019   Time of birth: 3:11 PM   Sex: male   Head Delivery Date/Time: 2020  3:11 PM   Delivery type: , Low Transverse   Gestational Age: 38w1d    Delivery Providers    Delivering clinician:  Vandana Smith MD           Measurements    Weight:  3160 g  Length:  48.3 cm  Head circumference:  35.5 cm  Chest circumference:  33.5 cm  Abdominal girth:  30.5 cm         Apgars    Living status:  Living  Apgars:   1 min.:   5 min.:   10 min.:   15 min.:   20 min.:     Skin color:   0  1       Heart rate:   2  2       Reflex irritability:   2  2       Muscle tone:   2  2       Respiratory effort:   2  2       Total:   8  9       Apgars assigned by:  MARCO A LEAL RN         Operative Delivery    Forceps attempted?:  No  Vacuum extractor attempted?:  No         Shoulder Dystocia    Shoulder dystocia present?:  No           Presentation    Presentation:  Vertex           Interventions/Resuscitation    Method:  Bulb Suctioning, Tactile Stimulation, Deep Suctioning       Cord    Vessels:  3 vessels  Complications:  None  Delayed Cord Clamping?:  Yes  Cord Clamped Date/Time:  2020  3:12 PM  Cord Blood Disposition:  Lab  Gases Sent?:  No  Stem Cell Collection (by MD):  No       Placenta    Placenta delivery date/time:  2020 1515  Placenta removal:  Manual removal  Placenta appearance:  Intact  Placenta  disposition:  discarded           Labor Events:       labor: No     Labor Onset Date/Time:         Dilation Complete Date/Time:         Start Pushing Date/Time:       Rupture Date/Time: 20  1510         Rupture type:           Fluid Amount:        Fluid Color:        Fluid Odor:        Membrane Status (PeriCalm):        Rupture Date/Time (PeriCalm):        Fluid Amount (PeriCalm):        Fluid Color (PeriCalm):         steroids: None     Antibiotics given for GBS: No     Induction: none     Indications for induction:        Augmentation:       Indications for augmentation:       Labor complications: None     Additional complications:          Cervical ripening:                     Delivery:      Episiotomy: None     Indication for Episiotomy:       Perineal Lacerations: None Repaired:      Periurethral Laceration:   Repaired:     Labial Laceration:   Repaired:     Sulcus Laceration:   Repaired:     Vaginal Laceration:   Repaired:     Cervical Laceration:   Repaired:     Repair suture:       Repair # of packets:       Last Value - EBL - Nursing (mL): 0     Sum - EBL - Nursing (mL): 0     Last Value - EBL - Anesthesia (mL):      Calculated QBL (mL):        Vaginal Sweep Performed:       Surgicount Correct:         Other providers:       Anesthesia    Method:  Spinal          Details (if applicable):  Trial of Labor No    Categorization: Repeat    Priority: Routine   Indications for : Repeat Section   Incision Type: low transverse     Additional  information:  Forceps:    Vacuum:    Breech:    Observed anomalies    Other (Comments):

## 2020-02-20 NOTE — ANESTHESIA PREPROCEDURE EVALUATION
02/20/2020  Malgorzata Yoder is a 35 y.o., female.    Anesthesia Evaluation    I have reviewed the Patient Summary Reports.    I have reviewed the Nursing Notes.   I have reviewed the Medications.     Review of Systems  Anesthesia Hx:  No problems with previous Anesthesia  Neg history of prior surgery. Denies Family Hx of Anesthesia complications.   Denies Personal Hx of Anesthesia complications.   Social:  Non-Smoker, No Alcohol Use    Hematology/Oncology:  Hematology Normal   Oncology Normal     EENT/Dental:EENT/Dental Normal   Cardiovascular:   Exercise tolerance: good Denies Pacemaker. Hypertension  NYHA Classification I ECG has been reviewed.    Pulmonary:   Asthma    Renal/:  Renal/ Normal     Hepatic/GI:  Hepatic/GI Normal    Musculoskeletal:  Musculoskeletal Normal    Neurological:  Neurology Normal    Endocrine:  Endocrine Normal    Psych:  Psychiatric Normal           Physical Exam  General:  Well nourished    Airway/Jaw/Neck:  Airway Findings: Mouth Opening: Normal Tongue: Normal  Mallampati: I  Improves to II with phonation.  TM Distance: Normal, at least 6 cm     Eyes/Ears/Nose:  EYES/EARS/NOSE FINDINGS: Normal   Dental:  DENTAL FINDINGS: Normal   Chest/Lungs:  Chest/Lungs Findings: Clear to auscultation, Normal Respiratory Rate     Heart/Vascular:  Heart Findings: Rate: Normal  Rhythm: Regular Rhythm  Sounds: Normal     Abdomen:  Abdomen Findings:  Normal, Soft     Musculoskeletal:  Musculoskeletal Findings: Normal   Skin:  Skin Findings: Normal    Mental Status:  Mental Status Findings: Normal        Anesthesia Plan  Type of Anesthesia, risks & benefits discussed:  Anesthesia Type:  spinal  Patient's Preference:   Intra-op Monitoring Plan: standard ASA monitors  Intra-op Monitoring Plan Comments:   Post Op Pain Control Plan: intrathecal opioid and multimodal analgesia  Post Op Pain  Control Plan Comments:   Induction:    Beta Blocker:         Informed Consent: Patient understands risks and agrees with Anesthesia plan.  Questions answered. Anesthesia consent signed with patient.  ASA Score: 2     Day of Surgery Review of History & Physical: I have interviewed and examined the patient. I have reviewed the patient's H&P dated:  There are no significant changes.          Ready For Surgery From Anesthesia Perspective.

## 2020-02-20 NOTE — ANESTHESIA POSTPROCEDURE EVALUATION
Anesthesia Post Evaluation    Patient: Malgorzata Yoder    Procedure(s) Performed: Procedure(s) (LRB):   SECTION (N/A)    Final Anesthesia Type: spinal    Patient location during evaluation: labor & delivery  Patient participation: Yes- Able to Participate  Level of consciousness: awake and alert and oriented  Post-procedure vital signs: reviewed and stable  Pain management: adequate  Airway patency: patent  MICHELLE mitigation strategies: Use of major conduction anesthesia (spinal/epidural) or peripheral nerve block  PONV status at discharge: No PONV  Anesthetic complications: no      Cardiovascular status: hemodynamically stable  Respiratory status: spontaneous ventilation and room air  Hydration status: euvolemic  Follow-up not needed.          Vitals Value Taken Time   /102 2020  2:17 PM   Temp 36.8 °C (98.3 °F) 2020 12:26 PM   Pulse 73 2020  2:31 PM   Resp 17 2020 12:26 PM   SpO2 100 % 2020  2:31 PM   Vitals shown include unvalidated device data.      No case tracking events are documented in the log.      Pain/Elba Score: No data recorded

## 2020-02-20 NOTE — TELEPHONE ENCOUNTER
----- Message from Vandana Smith MD sent at 2/20/2020  4:05 PM CST -----  3/26 @ 10:45a [facility]

## 2020-02-21 LAB
ABO + RH BLD: NORMAL
FETAL CELL SCN BLD QL ROSETTE: NORMAL
INJECT RH IG VOL PATIENT: NORMAL ML

## 2020-02-21 PROCEDURE — 11000001 HC ACUTE MED/SURG PRIVATE ROOM

## 2020-02-21 PROCEDURE — 25000003 PHARM REV CODE 250: Performed by: OBSTETRICS & GYNECOLOGY

## 2020-02-21 PROCEDURE — 25000003 PHARM REV CODE 250: Performed by: PHYSICIAN ASSISTANT

## 2020-02-21 PROCEDURE — 63600175 PHARM REV CODE 636 W HCPCS: Performed by: OBSTETRICS & GYNECOLOGY

## 2020-02-21 PROCEDURE — 63600519 RHOGAM PHARM REV CODE 636 ALT 250 W HCPCS: Performed by: OBSTETRICS & GYNECOLOGY

## 2020-02-21 RX ORDER — LABETALOL 200 MG/1
200 TABLET, FILM COATED ORAL EVERY 12 HOURS
Status: DISCONTINUED | OUTPATIENT
Start: 2020-02-21 | End: 2020-02-22 | Stop reason: HOSPADM

## 2020-02-21 RX ADMIN — LABETALOL HYDROCHLORIDE 200 MG: 200 TABLET, FILM COATED ORAL at 10:02

## 2020-02-21 RX ADMIN — HUMAN RHO(D) IMMUNE GLOBULIN 300 MCG: 300 INJECTION, SOLUTION INTRAMUSCULAR at 03:02

## 2020-02-21 RX ADMIN — IBUPROFEN 800 MG: 800 TABLET, FILM COATED ORAL at 10:02

## 2020-02-21 RX ADMIN — CHLORHEXIDINE GLUCONATE 0.12% ORAL RINSE 10 ML: 1.2 LIQUID ORAL at 10:02

## 2020-02-21 RX ADMIN — KETOROLAC TROMETHAMINE 30 MG: 30 INJECTION, SOLUTION INTRAMUSCULAR; INTRAVENOUS at 02:02

## 2020-02-21 RX ADMIN — KETOROLAC TROMETHAMINE 30 MG: 30 INJECTION, SOLUTION INTRAMUSCULAR; INTRAVENOUS at 06:02

## 2020-02-21 RX ADMIN — CHLORHEXIDINE GLUCONATE 0.12% ORAL RINSE 10 ML: 1.2 LIQUID ORAL at 08:02

## 2020-02-21 RX ADMIN — HYDROCODONE BITARTRATE AND ACETAMINOPHEN 1 TABLET: 5; 325 TABLET ORAL at 02:02

## 2020-02-21 RX ADMIN — LABETALOL HYDROCHLORIDE 200 MG: 200 TABLET, FILM COATED ORAL at 08:02

## 2020-02-21 NOTE — ASSESSMENT & PLAN NOTE
POD #1 s/p repeat LTCS  Pt doing well, afebrile overnight. Aquacel dressing changed at bedside, incision is intact, probed with sterile applicator and small amount of serosanguinous fluid released. Replaced Aquacel. Proceed with routine post-partum care, encouraged ambulation, aware ok to shower.  Pt counseled on management plan and discharge goals. Anticipate discharge in 1-2 days

## 2020-02-21 NOTE — LACTATION NOTE
"Lactation Rounds:     Visited mother at bedside. She was upright in bed having lunch; infant sleeping in crib. Mother stated that infant is "latching great" and denies problems or pain with feeding. She reported breastfeeding her previous child for four months, until she went back to work and began formula feeding. She stated that this is what she plans to do with this baby (Junior) too.     Admit teaching done, including feeding on demand, recognition of feeding cues, expectations for infant feeding/behavior in first day of life, importance of skin to skin contact, risks of artificial nipples and pacifiers, risks of non-medically indicated formula supplementation. Encouraged mother to call for feeding observation today. Lactation phone number was provided with encouragement to call with any questions or concerns or for observation of/assistance with next feeding.       "

## 2020-02-21 NOTE — HOSPITAL COURSE
Patient underwent repeat  without complication.  Transferred to mother baby unit for routine post partum care. Patient progressed well postoperatively without complication.    20-stable for discharge on pod #2

## 2020-02-21 NOTE — PROGRESS NOTES
Dr Barraza contacted to let her know patient has a BMI of 42 and is not on Lovenox. Dr Barraza declined to order Lovenox because of the bloody drainage on her  bandage this am. Bandage changed by physician assistant Svitlana Olivera, new bandage has a small amount of serosanguineous drainage on it. Drainage marked.

## 2020-02-21 NOTE — PROGRESS NOTES
[facility] -   Obstetrics  Postpartum Progress Note    Patient Name: Malgorzata Yoder  MRN: [id number]  Admission Date: 2020  Hospital Length of Stay: 1 days  Attending Physician: Vandana Smith MD  Primary Care Provider: Sofia Abdul MD    Subjective:     Principal Problem:Status post repeat low transverse  section    Hospital course: Patient underwent repeat  without complication.  Transferred to mother baby unit for routine post partum care. Patient progressed well postoperatively without complication.        Interval History:     She is doing well this morning. She denies headache or vision changes. She is tolerating a regular diet without nausea or vomiting. She is voiding spontaneously. She is ambulating. She has passed flatus, and has not a BM. Vaginal bleeding is mild. She denies fever or chills. Abdominal pain is mild and controlled with oral medications. She is not breastfeeding.     Objective:     Vital Signs (Most Recent):  Temp: 97.7 °F (36.5 °C) (20)  Pulse: 79 (20)  Resp: 18 (20)  BP: (!) 153/88 (20)  SpO2: 99 % (20 1804) Vital Signs (24h Range):  Temp:  [97.3 °F (36.3 °C)-98.7 °F (37.1 °C)] 97.7 °F (36.5 °C)  Pulse:  [0-90] 79  Resp:  [16-20] 18  SpO2:  [96 %-100 %] 99 %  BP: (141-175)/() 153/88     Weight: 114.7 kg (252 lb 13.9 oz)  Body mass index is 42.08 kg/m².      Intake/Output Summary (Last 24 hours) at 2020 0854  Last data filed at 2020 0340  Gross per 24 hour   Intake 3412.17 ml   Output 1685 ml   Net 1727.17 ml       Significant Labs:  Lab Results   Component Value Date    GROUPTRH A NEG 2020    HEPBSAG Negative 2019    STREPBCULT (A) 2020     STREPTOCOCCUS AGALACTIAE (GROUP B)  Beta-hemolytic streptococci are routinely susceptible to   penicillins,cephalosporins and carbapenems.       Recent Labs   Lab 20  1230   HGB 11.6*   HCT 34.7*       I have personallly  reviewed all pertinent lab results from the last 24 hours.    Physical Exam:   Constitutional: She is oriented to person, place, and time. She appears well-developed and well-nourished. No distress.       Cardiovascular: Normal rate, regular rhythm, normal heart sounds and intact distal pulses.    No murmur heard.   Pulmonary/Chest: Effort normal and breath sounds normal. No respiratory distress. She has no wheezes. She has no rales.        Abdominal: Soft. Bowel sounds are normal. She exhibits abdominal incision (Aquacel dressing in place, the dressing is completely saturated with significant ballooning). She exhibits no distension. There is no tenderness. There is no guarding.   Uterine fundus firm, below umbilicus, mild tenderness (appropriate)             Musculoskeletal: Moves all extremeties. She exhibits no edema.   No calf tenderness       Neurological: She is alert and oriented to person, place, and time. She has normal reflexes.   No clonus    Skin: Skin is warm and dry. No rash noted. She is not diaphoretic.        Assessment/Plan:     35 y.o. female  for:    * Status post repeat low transverse  section  POD #1 s/p repeat LTCS  Pt doing well, afebrile overnight. Aquacel dressing changed at bedside, incision is intact, probed with sterile applicator and small amount of serosanguinous fluid released. Replaced Aquacel. Proceed with routine post-partum care, encouraged ambulation, aware ok to shower.  Pt counseled on management plan and discharge goals. Anticipate discharge in 1-2 days            Severe obesity with body mass index (BMI) of 35.0 to 39.9 with serious comorbidity  Will hold off on Lovenox ppx due to degree of drainage from incision. Encouraged ambulation and SCD's while in bed    Hypertension  Blood pressures elevated post delivery, patient remains asymptomatic with no HA, reflexes normal. Of note patient with significant vomiting yesterday, and was unable to tolerate oral doses  of labetalol at first. Now tolerating po. Will increase her home dose of labetalol from 100 mg to 200 mg BID and monitor.        Disposition: As patient meets milestones, will plan to discharge.    Svitlana Olivera PA-C  Obstetrics  [facility]

## 2020-02-21 NOTE — PLAN OF CARE
Progressing well. Fundus firm, bleeding light. Dressing intact with small amount of marked serosanguineous drainage. Pain controlled with po analgesics. Bonding with baby. VSS/WNLS. NAD.

## 2020-02-21 NOTE — ASSESSMENT & PLAN NOTE
Blood pressures elevated post delivery, patient remains asymptomatic with no HA, reflexes normal. Of note patient with significant vomiting yesterday, and was unable to tolerate oral doses of labetalol at first. Now tolerating po. Will increase her home dose of labetalol from 100 mg to 200 mg BID and monitor.

## 2020-02-21 NOTE — SUBJECTIVE & OBJECTIVE
Hospital course: Patient underwent repeat  without complication.  Transferred to mother baby unit for routine post partum care. Patient progressed well postoperatively without complication.        Interval History:     She is doing well this morning. She denies headache or vision changes. She is tolerating a regular diet without nausea or vomiting. She is voiding spontaneously. She is ambulating. She has passed flatus, and has not a BM. Vaginal bleeding is mild. She denies fever or chills. Abdominal pain is mild and controlled with oral medications. She is not breastfeeding.     Objective:     Vital Signs (Most Recent):  Temp: 97.7 °F (36.5 °C) (20)  Pulse: 79 (20)  Resp: 18 (20)  BP: (!) 153/88 (20)  SpO2: 99 % (20 1804) Vital Signs (24h Range):  Temp:  [97.3 °F (36.3 °C)-98.7 °F (37.1 °C)] 97.7 °F (36.5 °C)  Pulse:  [0-90] 79  Resp:  [16-20] 18  SpO2:  [96 %-100 %] 99 %  BP: (141-175)/() 153/88     Weight: 114.7 kg (252 lb 13.9 oz)  Body mass index is 42.08 kg/m².      Intake/Output Summary (Last 24 hours) at 2020 0854  Last data filed at 2020 0340  Gross per 24 hour   Intake 3412.17 ml   Output 1685 ml   Net 1727.17 ml       Significant Labs:  Lab Results   Component Value Date    GROUPTRH A NEG 2020    HEPBSAG Negative 2019    STREPBCULT (A) 2020     STREPTOCOCCUS AGALACTIAE (GROUP B)  Beta-hemolytic streptococci are routinely susceptible to   penicillins,cephalosporins and carbapenems.       Recent Labs   Lab 20  1230   HGB 11.6*   HCT 34.7*       I have personallly reviewed all pertinent lab results from the last 24 hours.    Physical Exam:   Constitutional: She is oriented to person, place, and time. She appears well-developed and well-nourished. No distress.       Cardiovascular: Normal rate, regular rhythm, normal heart sounds and intact distal pulses.    No murmur heard.   Pulmonary/Chest: Effort normal and  breath sounds normal. No respiratory distress. She has no wheezes. She has no rales.        Abdominal: Soft. Bowel sounds are normal. She exhibits abdominal incision (Aquacel dressing in place, the dressing is completely saturated with significant ballooning). She exhibits no distension. There is no tenderness. There is no guarding.   Uterine fundus firm, below umbilicus, mild tenderness (appropriate)             Musculoskeletal: Moves all extremeties. She exhibits no edema.   No calf tenderness       Neurological: She is alert and oriented to person, place, and time. She has normal reflexes.   No clonus    Skin: Skin is warm and dry. No rash noted. She is not diaphoretic.

## 2020-02-21 NOTE — ASSESSMENT & PLAN NOTE
Will hold off on Lovenox ppx due to degree of drainage from incision. Encouraged ambulation and SCD's while in bed

## 2020-02-22 VITALS
DIASTOLIC BLOOD PRESSURE: 65 MMHG | HEIGHT: 65 IN | OXYGEN SATURATION: 99 % | SYSTOLIC BLOOD PRESSURE: 145 MMHG | WEIGHT: 252.88 LBS | BODY MASS INDEX: 42.13 KG/M2 | TEMPERATURE: 99 F | HEART RATE: 89 BPM | RESPIRATION RATE: 18 BRPM

## 2020-02-22 PROBLEM — M79.601 PARESTHESIA AND PAIN OF BOTH UPPER EXTREMITIES: Status: RESOLVED | Noted: 2019-06-11 | Resolved: 2020-02-22

## 2020-02-22 PROBLEM — O34.32 CERVICAL CERCLAGE SUTURE PRESENT IN SECOND TRIMESTER: Status: RESOLVED | Noted: 2019-09-10 | Resolved: 2020-02-22

## 2020-02-22 PROBLEM — O09.292: Status: RESOLVED | Noted: 2019-09-10 | Resolved: 2020-02-22

## 2020-02-22 PROBLEM — O09.299 HISTORY OF CERCLAGE, CURRENTLY PREGNANT: Status: RESOLVED | Noted: 2019-07-02 | Resolved: 2020-02-22

## 2020-02-22 PROBLEM — O34.219 HISTORY OF CESAREAN DELIVERY, CURRENTLY PREGNANT: Status: RESOLVED | Noted: 2019-07-02 | Resolved: 2020-02-22

## 2020-02-22 PROBLEM — M79.602 PARESTHESIA AND PAIN OF BOTH UPPER EXTREMITIES: Status: RESOLVED | Noted: 2019-06-11 | Resolved: 2020-02-22

## 2020-02-22 PROBLEM — Z98.890 HISTORY OF CERCLAGE, CURRENTLY PREGNANT: Status: RESOLVED | Noted: 2019-07-02 | Resolved: 2020-02-22

## 2020-02-22 PROBLEM — R20.2 PARESTHESIA AND PAIN OF BOTH UPPER EXTREMITIES: Status: RESOLVED | Noted: 2019-06-11 | Resolved: 2020-02-22

## 2020-02-22 PROCEDURE — 25000003 PHARM REV CODE 250: Performed by: OBSTETRICS & GYNECOLOGY

## 2020-02-22 PROCEDURE — 25000003 PHARM REV CODE 250: Performed by: PHYSICIAN ASSISTANT

## 2020-02-22 RX ORDER — LABETALOL 200 MG/1
200 TABLET, FILM COATED ORAL EVERY 12 HOURS
Qty: 60 TABLET | Refills: 11 | Status: SHIPPED | OUTPATIENT
Start: 2020-02-22 | End: 2021-04-13 | Stop reason: SDUPTHER

## 2020-02-22 RX ORDER — IBUPROFEN 800 MG/1
800 TABLET ORAL EVERY 8 HOURS
Qty: 30 TABLET | Refills: 0 | Status: SHIPPED | OUTPATIENT
Start: 2020-02-22 | End: 2020-03-03

## 2020-02-22 RX ORDER — HYDROCODONE BITARTRATE AND ACETAMINOPHEN 5; 325 MG/1; MG/1
1 TABLET ORAL EVERY 4 HOURS PRN
Qty: 20 TABLET | Refills: 0 | Status: SHIPPED | OUTPATIENT
Start: 2020-02-22 | End: 2020-07-31

## 2020-02-22 RX ADMIN — IBUPROFEN 800 MG: 800 TABLET, FILM COATED ORAL at 01:02

## 2020-02-22 RX ADMIN — LABETALOL HYDROCHLORIDE 200 MG: 200 TABLET, FILM COATED ORAL at 09:02

## 2020-02-22 RX ADMIN — HYDROCODONE BITARTRATE AND ACETAMINOPHEN 1 TABLET: 10; 325 TABLET ORAL at 03:02

## 2020-02-22 RX ADMIN — CHLORHEXIDINE GLUCONATE 0.12% ORAL RINSE 10 ML: 1.2 LIQUID ORAL at 09:02

## 2020-02-22 RX ADMIN — IBUPROFEN 800 MG: 800 TABLET, FILM COATED ORAL at 05:02

## 2020-02-22 NOTE — PROGRESS NOTES
[facility]  Obstetrics  Postpartum Progress Note    Patient Name: Malgorzata Yoder  MRN: [id number]  Admission Date: 2020  Hospital Length of Stay: 2 days  Attending Physician: Vandana Smith MD  Primary Care Provider: Sofia Abdul MD    Subjective:     Principal Problem:Status post repeat low transverse  section    No new subjective & objective note has been filed under this hospital service since the last note was generated.    Assessment/Plan:     35 y.o. female  for:    * Status post repeat low transverse  section  POD #2 s/p repeat LTCS  Pt doing well, afebrile overnight.  Tolerating diet, +void; +ambulation  Stable for discharge depending on blood pressures        Asthma  stable    Hx of preeclampsia, prior pregnancy, currently pregnant  Denies any signs/symptoms of preE    Severe obesity with body mass index (BMI) of 35.0 to 39.9 with serious comorbidity  Will hold off on Lovenox ppx due to degree of drainage from incision. Encouraged ambulation and SCD's while in bed    Hypertension  Blood pressures elevated post delivery, patient remains asymptomatic with no HA, reflexes normal. Of note patient with significant vomiting yesterday, and was unable to tolerate oral doses of labetalol at first. Now tolerating po. Will increase her home dose of labetalol from 100 mg to 200 mg BID and monitor.  2020  Rare elevated bp; if remains elevated will increase labetalol to tid and continue to observe  , bp 148/79, 159/87; stable for discharge        Disposition: As patient meets milestones, will plan to discharge today.    Amber Yoder MD  Obstetrics  [facility]

## 2020-02-22 NOTE — ASSESSMENT & PLAN NOTE
Blood pressures elevated post delivery, patient remains asymptomatic with no HA, reflexes normal. Of note patient with significant vomiting yesterday, and was unable to tolerate oral doses of labetalol at first. Now tolerating po. Will increase her home dose of labetalol from 100 mg to 200 mg BID and monitor.  02/22/2020  Rare elevated bp; if remains elevated will increase labetalol to tid and continue to observe

## 2020-02-22 NOTE — SUBJECTIVE & OBJECTIVE
Hospital course: Patient underwent repeat  without complication.  Transferred to mother baby unit for routine post partum care. Patient progressed well postoperatively without complication.    20-stable for discharge on pod #2    Interval History:     Pod #2    She is doing well this morning. She is tolerating a regular diet without nausea or vomiting. She is voiding spontaneously. She is ambulating. She has passed flatus, and has not a BM. Vaginal bleeding is mild. She denies fever or chills. Abdominal pain is mild and controlled with oral medications. She is breastfeeding. She desires circumcision for her male baby: yes.    Objective:     Vital Signs (Most Recent):  Temp: 97.5 °F (36.4 °C) (20)  Pulse: 104 (20)  Resp: 18 (20)  BP: (!) 159/87 (20)  SpO2: 99 % (20 1804) Vital Signs (24h Range):  Temp:  [97.5 °F (36.4 °C)-98.6 °F (37 °C)] 97.5 °F (36.4 °C)  Pulse:  [] 104  Resp:  [18] 18  BP: (127-178)/(70-89) 159/87     Weight: 114.7 kg (252 lb 13.9 oz)  Body mass index is 42.08 kg/m².    No intake or output data in the 24 hours ending 20 0824    Significant Labs:  Lab Results   Component Value Date    GROUPTRH A NEG 2020    HEPBSAG Negative 2019    STREPBCULT (A) 2020     STREPTOCOCCUS AGALACTIAE (GROUP B)  Beta-hemolytic streptococci are routinely susceptible to   penicillins,cephalosporins and carbapenems.       Recent Labs   Lab 20  1230   HGB 11.6*   HCT 34.7*       I have personallly reviewed all pertinent lab results from the last 24 hours.  Recent Lab Results     None          Physical Exam:   Constitutional: She is oriented to person, place, and time. She appears well-developed.    HENT:   Head: Normocephalic.    Eyes: Pupils are equal, round, and reactive to light.    Neck: Normal range of motion.    Cardiovascular: Normal rate and regular rhythm.     Pulmonary/Chest: Effort normal and breath sounds normal.         Abdominal: Soft. Bowel sounds are normal. She exhibits abdominal incision (aquasel dressing intact, no errythema, exudate, induration).     Genitourinary:   Genitourinary Comments: Ut--firm, non tender           Musculoskeletal: Normal range of motion. She exhibits no edema.       Neurological: She is alert and oriented to person, place, and time. She has normal reflexes.    Skin: Skin is warm and dry.    Psychiatric: She has a normal mood and affect. Her behavior is normal. Judgment and thought content normal.

## 2020-02-22 NOTE — NURSING
Patient discharged in stable condition with baby in arms.  Patient verbalized discharge instructions.

## 2020-02-22 NOTE — LACTATION NOTE
This note was copied from a baby's chart.  Lactation Rounds.      Mother states that breastfeeding is going well.  However, she has asked for ice packs for her nipples d/t discomfort between feedings.   Observed an independent latch to the breast. R breast, clutch hold.  Mother leans in to baby and he is laying on his back.  Assisted to change position so that infant has tummy against mother and nose is towards the nipple.  Mother guides infant onto the breast.  Maternal hand position guided to support shoulder blades and neck, rather than the head.  Mother advised to hold infant with firm support to prevent falling back into a shallow latch.  Mother expresses concern regarding her pendulous breasts and the baby's ability to breathe.  Demonstrated how to keep the nose clear by leading with the chin and firm support behind the shoulders.  Mother return demo and verbalizes understanding.  Breasts: bilateral pendulous, no engorgement, lumps, or plugged ducts noted.  Nipples: Tender per mother, intact, no cracking/bleeding/blisterd noted, color WDL.  Latch: When positioned close to mother infant achieves a deep, asymmetric latch.  Feeding: Nutritive sucking noted x12 minutes. Rhythmic jaw movement and frequent audible swallows observed.   Suck assessment: Upper lip tends to turn and an needs to be manually flanged.  Tongue has difficulty cupping a gloved finger.  Suck is strong and tongue moves in an undulating motion.  Oral assessment: Thick labial frenulum noted with blanching to gums on passive eversion.  Large cleft in gum where frenulum attaches.  Thick, inelastic lingual frenulum noted ending at the tip of the tongue.  Tongue does not extend to gumline, minimal elevation noted.    Mother asked to call for the next feeding for positioning assistance.  Awaiting lab results before able to know if going home today or tomorrow.  Will continue to monitor.

## 2020-02-22 NOTE — ASSESSMENT & PLAN NOTE
Blood pressures elevated post delivery, patient remains asymptomatic with no HA, reflexes normal. Of note patient with significant vomiting yesterday, and was unable to tolerate oral doses of labetalol at first. Now tolerating po. Will increase her home dose of labetalol from 100 mg to 200 mg BID and monitor.  02/22/2020  Rare elevated bp; if remains elevated will increase labetalol to tid and continue to observe  2/22/, bp 148/79, 159/87; stable for discharge

## 2020-02-22 NOTE — DISCHARGE INSTRUCTIONS
"Mother Self Care:    Activity: Avoid strenuous exercise and get adequate rest.  No driving until the physician consent given.  Emotional Changes: Most women find birth to be a time of great emotional upheaval.  Sense of loss, mood swings, fatigue, anxiety, and feeling "let down" are common.  If feelings worsen or last more than a week, call your physician.  Breast Care/Breastfeeding: Wear a bra for comfort.  Keep nipples dry and apply your own breast milk or lanolin cream as needed for soreness.  Engorgement can be relieved with warm, moist heat before feedings.  You may also take Ibuprofen.  Breast Care/Bottle Feeding: Wear support bra 24 hours a day for one week. You may use ice packs for discomfort.  Eugenio-Care/Vaginal Bleeding: Remember to use your eugenio-bottle after urinating.  Your flow will change from red, to pink, to yellow/white color over a period of 2 weeks.  Menstruation will return in 3-8 weeks, or longer if breastfeeding.   Section/Tubal Ligation: Keep incision clean and dry. You may shower, but avoid baths.  Sexual Activity/Pelvic Rest: No sexual activity, tampons, or douching until your physician gives you consent.  Diet: Continue to eat from the five basic food groups, including plenty of protein, fruits, vegetables, and whole grains.  Limit empty calories and high fat foods.  Drink enough fluids to satisfy thirst and add an extra 500 calories for breastfeeding.  Constipation/Hemorrhoids: Drink plenty of water.  You may take a stool softener or natural laxative (Metamucil). You may use tucks or hemorrhoid ointment and soak in a warm tub.    CALL YOUR OB DOCTOR IF ANY OF THE FOLLOWING OCCURS:  *Heavy bleeding - saturating a pad an hour or passing any large (2-3 inches in size) blood clots.  *Any pain, redness, or tenderness in lower leg.  *You cannot care for yourself or your baby.  *Any signs of infection-      - Temperature greater than 100.5 degrees F      - Foul smelling vaginal discharge " and/or incisional drainage      - Increased episiotomy or incisional pain      - Hot, hard, red or sore area on breast      - Flu-like symptoms      - Any urgency, frequency or burning with urination    Return To the Hospital for further Evaluation:  · Headache not relieved by tylenol or ibuprofen  · Blurry vision, double vision, seeing spots, or flashing lights  · Feeling faint or passing out  · Right epigastric pain  · Difficulty breathing  · Swelling in hands, face, or feet  · Any of these symptoms accompanied by nausea/vomiting  · Gaining more than 5 pounds in one week  · Seizures  These symptoms could be an indication of elevated blood pressure.       If you have any questions that need to be answered immediately please call the Labor & Delivery Unit at [phone] and ask to speak to a nurse.

## 2020-02-22 NOTE — PROGRESS NOTES
[facility] -   Obstetrics  Postpartum Progress Note    Patient Name: Malgorzata Yoder  MRN: [id number]  Admission Date: 2020  Hospital Length of Stay: 2 days  Attending Physician: Vandana Smith MD  Primary Care Provider: Sofia Abdul MD    Subjective:     Principal Problem:Status post repeat low transverse  section    Hospital course: Patient underwent repeat  without complication.  Transferred to mother baby unit for routine post partum care. Patient progressed well postoperatively without complication.    20-stable for discharge on pod #2    Interval History:     Pod #2    She is doing well this morning. She is tolerating a regular diet without nausea or vomiting. She is voiding spontaneously. She is ambulating. She has passed flatus, and has not a BM. Vaginal bleeding is mild. She denies fever or chills. Abdominal pain is mild and controlled with oral medications. She is breastfeeding. She desires circumcision for her male baby: yes.    Objective:     Vital Signs (Most Recent):  Temp: 97.5 °F (36.4 °C) (20 08)  Pulse: 104 (20 08)  Resp: 18 (20 08)  BP: (!) 159/87 (20 08)  SpO2: 99 % (20 1804) Vital Signs (24h Range):  Temp:  [97.5 °F (36.4 °C)-98.6 °F (37 °C)] 97.5 °F (36.4 °C)  Pulse:  [] 104  Resp:  [18] 18  BP: (127-178)/(70-89) 159/87     Weight: 114.7 kg (252 lb 13.9 oz)  Body mass index is 42.08 kg/m².    No intake or output data in the 24 hours ending 20 0824    Significant Labs:  Lab Results   Component Value Date    GROUPTRH A NEG 2020    HEPBSAG Negative 2019    STREPBCULT (A) 2020     STREPTOCOCCUS AGALACTIAE (GROUP B)  Beta-hemolytic streptococci are routinely susceptible to   penicillins,cephalosporins and carbapenems.       Recent Labs   Lab 20  1230   HGB 11.6*   HCT 34.7*       I have personallly reviewed all pertinent lab results from the last 24 hours.  Recent Lab Results      None          Physical Exam:   Constitutional: She is oriented to person, place, and time. She appears well-developed.    HENT:   Head: Normocephalic.    Eyes: Pupils are equal, round, and reactive to light.    Neck: Normal range of motion.    Cardiovascular: Normal rate and regular rhythm.     Pulmonary/Chest: Effort normal and breath sounds normal.        Abdominal: Soft. Bowel sounds are normal. She exhibits abdominal incision (aquasel dressing intact, no errythema, exudate, induration).     Genitourinary:   Genitourinary Comments: Ut--firm, non tender           Musculoskeletal: Normal range of motion. She exhibits no edema.       Neurological: She is alert and oriented to person, place, and time. She has normal reflexes.    Skin: Skin is warm and dry.    Psychiatric: She has a normal mood and affect. Her behavior is normal. Judgment and thought content normal.       Assessment/Plan:     35 y.o. female  for:    * Status post repeat low transverse  section  POD #2 s/p repeat LTCS  Pt doing well, afebrile overnight.  Tolerating diet, +void; +ambulation  Stable for discharge depending on blood pressures        Asthma  stable    Hx of preeclampsia, prior pregnancy, currently pregnant  Denies any signs/symptoms of preE    Severe obesity with body mass index (BMI) of 35.0 to 39.9 with serious comorbidity  Will hold off on Lovenox ppx due to degree of drainage from incision. Encouraged ambulation and SCD's while in bed    Hypertension  Blood pressures elevated post delivery, patient remains asymptomatic with no HA, reflexes normal. Of note patient with significant vomiting yesterday, and was unable to tolerate oral doses of labetalol at first. Now tolerating po. Will increase her home dose of labetalol from 100 mg to 200 mg BID and monitor.  2020  Rare elevated bp; if remains elevated will increase labetalol to tid and continue to observe        Disposition: As patient meets milestones, will plan to  discharge today if bp stable; else adjust medication and will continue to observe for 1-2 days.    Amber Yoder MD  Obstetrics  [facility]

## 2020-02-22 NOTE — ASSESSMENT & PLAN NOTE
POD #2 s/p repeat LTCS  Pt doing well, afebrile overnight.  Tolerating diet, +void; +ambulation  Stable for discharge depending on blood pressures

## 2020-02-22 NOTE — DISCHARGE SUMMARY
[facility] -   Obstetrics  Discharge Summary      Patient Name: Malgorzata Yoder  MRN: [id number]  Admission Date: 2020  Hospital Length of Stay: 2 days  Discharge Date and Time:  2020 10:32 AM  Attending Physician: Vandana Smith MD   Discharging Provider: Amber Yoder MD   Primary Care Provider: Sofia Abdul MD    HPI: 34 y/o  at 38w1d with chronic htn who desires elective repeat c/section        Procedure(s) (LRB):   SECTION (N/A)     Hospital Course:   Patient underwent repeat  without complication.  Transferred to mother baby unit for routine post partum care. Patient progressed well postoperatively without complication.    20-stable for discharge on pod #2     Consults (From admission, onward)        Status Ordering Provider     Inpatient consult to Anesthesiology  Once     Provider:  (Not yet assigned)    Acknowledged VANDANA SMITH          Final Active Diagnoses:    Diagnosis Date Noted POA    PRINCIPAL PROBLEM:  Status post repeat low transverse  section [Z98.891] 2020 Not Applicable    Asthma [J45.909] 2019 Yes    Hx of preeclampsia, prior pregnancy, currently pregnant [O09.299] 2019 Not Applicable    Severe obesity with body mass index (BMI) of 35.0 to 39.9 with serious comorbidity [E66.01] 08/15/2017 Yes    Hypertension [I10]  Yes      Problems Resolved During this Admission:        Labs: All labs within the past 24 hours have been reviewed    Feeding Method: breast    Immunizations     Date Immunization Status Dose Route/Site Given by    20 1600 Rho (D) Immune Globulin - IM Incomplete 300 mcg Intramuscular/     20 1700 MMR Incomplete 0.5 mL Subcutaneous/Left deltoid     20 1700 Tdap Incomplete 0.5 mL Intramuscular/Left deltoid     20 0345 Rho (D) Immune Globulin - IM Given 300 mcg Intramuscular/Left upper quad. gluteus Idalmis Rich LPN          Delivery:    Episiotomy: None    Lacerations: None   Repair suture:     Repair # of packets:     Blood loss (ml): 0     Birth information:  YOB: 2019   Time of birth: 3:11 PM   Sex: male   Delivery type: , Low Transverse   Gestational Age: 38w1d    Delivery Clinician:      Other providers:       Additional  information:  Forceps:    Vacuum:    Breech:    Observed anomalies      Living?:           APGARS  One minute Five minutes Ten minutes   Skin color:         Heart rate:         Grimace:         Muscle tone:         Breathing:         Totals: 8  9        Placenta: Delivered:       appearance    Pending Diagnostic Studies:     None          Discharged Condition: good    Disposition: Home or Self Care    Follow Up:  Follow-up Information     Vandana Smith MD On 3/26/2020.    Specialties:  Obstetrics, Obstetrics and Gynecology  Why:  10:[address] [place] postpartum  Contact information:  [id number] [facility] [place] [id number] [phone]             [facility] In 1 week.    Why:  re[place]. bandage removal;/bp ck--chtn               Patient Instructions:      Diet Cardiac     Call MD for:  temperature >100.4     Call MD for:  persistent nausea and vomiting or diarrhea     Call MD for:  severe uncontrolled pain     Call MD for:  redness, tenderness, or signs of infection (pain, swelling, redness, odor or green/yellow discharge around incision site)     Call MD for:  difficulty breathing or increased cough     Call MD for:  persistent dizziness, light-headedness, or visual disturbances     Call MD for:   Order Comments: Bleeding more than 1 pad per hour     Leave dressing on - Keep it clean, dry, and intact until clinic visit     Pelvic Rest   Order Comments: x6 wks-no tampons, intercourse, douching; showers only     Medications:  Current Discharge Medication List      START taking these medications    Details   HYDROcodone-acetaminophen (NORCO) 5-325 mg per tablet Take 1 tablet by mouth every 4 (four) hours as needed.  Qty:  20 tablet, Refills: 0    Comments: Quantity prescribed more than 7 day supply? No      ibuprofen (ADVIL,MOTRIN) 800 MG tablet Take 1 tablet (800 mg total) by mouth every 8 (eight) hours. for 10 days  Qty: 30 tablet, Refills: 0         CONTINUE these medications which have CHANGED    Details   labetalol (NORMODYNE) 200 MG tablet Take 1 tablet (200 mg total) by mouth every 12 (twelve) hours.  Qty: 60 tablet, Refills: 11         CONTINUE these medications which have NOT CHANGED    Details   prenatal vit calc,iron,folic (PRENATAL VITAMIN ORAL) Take by mouth.             Amber Yoder MD  Obstetrics  [facility]

## 2020-02-22 NOTE — LACTATION NOTE
This note was copied from a baby's chart.  Called by mother to assist with positioning.  Mother seated in bed in easton's position, infant to her R side on pillows to be even with the breast. Assisted infant to close contact with mother and she latches him to breast independently.    Mother reports the initial latch is uncomfortable.  Downward pressure on the chin while moving the shoulders in helped to achieve a more comfortable latch.  Rhythmic jaw movement with frequent audible swallows noted.  Mother reports the pain dissipates as the feeding goes on.  She states that her breasts are feeling full.      Mother inquired about reviewing hand expression.    Mother was taught hand expression of breastmilk/colostrum. She was instructed to:   Sit upright and lean forward, if possible.   When feasible, apply warm, wet compress over breasts for a few minutes.    Perform gentle breast massage.   Form a C with her hand and place it about 1 inch back from the areola with the nipple centered between her index finger and her thumb.   Press, compress, relax:  Using her finger and thumb, apply pressure in an inward direction toward the breast without stretching the tissue, compress the breast tissue between her finger and thumb, then relax her finger and thumb. Repeat process for a few minutes.   Rotate placement of finger and thumb on the breasts to facilitate emptying.   Collect expressed breastmilk/colostrum with a spoon or cup and feed immediately to the baby, if able.   If unable to feed immediately, place breastmilk/colostrum directly into a sterile storage container for later use. Place the babys breast milk label (with the date and time of collection and the names of mother's medications) on the container. Reviewed proper handling and storage of expressed breastmilk.   Patient effectively return demonstrated and verbalized understanding. Two handed technique worked best.    Discussed options for dealing  with nipple tenderness.  Discussed the availability of outpatient consults and the lactation warm line.      Mother reports her breastfeeding goal is to breast feed for 4-6 weeks and to begin formula when she returns to work.  Encouraged to use to warm line for any concerns after hospital discharge.

## 2020-02-23 ENCOUNTER — DOCUMENTATION ONLY (OUTPATIENT)
Dept: LACTATION | Facility: CLINIC | Age: 35
End: 2020-02-23

## 2020-02-24 NOTE — H&P
Malgorzata Yoder 35 y.o.  with IUP 38w1d presents for repeat csection.     Past Medical History:   Diagnosis Date    Asthma     childhood    Cervical cerclage suture present in second trimester 9/10/2019    12:00. Catina cerclage    H/O incompetent cervix, currently pregnant, second trimester 9/10/2019    History of cerclage, currently pregnant 2019. Late prenatal care at 4 months, incidental finding of membranes in vaginal vault, cerclage placed per MFM - report requested    History of  delivery, currently pregnant 2019 failed  Cytotec induction of labor at 37 weeks secondary to  severe preeclampsia - [facility] -Dr. Cass Haywood   C/S -(uterine incision not documented) secondary to severe preeclampsia and recurrent late deceleration with SGA baby 4 lb 15 oz.  Dense fibrotic scar tissue was noted during surgery    History of ovarian cyst     Hypertension     Hypokalemia 2014    Paresthesia and pain of both upper extremities 2019       Past Surgical History:   Procedure Laterality Date    CARPAL TUNNEL RELEASE Bilateral     CERVICAL CERCLAGE N/A 9/10/2019    Procedure: CERCLAGE, CERVIX;  Surgeon: Vandana Smith MD;  Location: [facility] OR;  Service: OB/GYN;  Laterality: N/A;     SECTION       SECTION N/A 2020    Procedure:  SECTION;  Surgeon: Vandana Smith MD;  Location: [facility] L&D;  Service: OB/GYN;  Laterality: N/A;    MOUTH SURGERY      OOPHORECTOMY Right 2010       Family History   Problem Relation Age of Onset    Aneurysm Mother     Cancer Maternal Aunt         brain tumor    Breast cancer Maternal Grandmother     Colon cancer Neg Hx     Ovarian cancer Neg Hx     Uterine cancer Neg Hx     Cervical cancer Neg Hx        Social History     Socioeconomic History    Marital status: Single     Spouse name: Not on file    Number of children: 1    Years of education: Not on file    Highest  education level: Not on file   Occupational History    Occupation: [employer]     Employer: [employer]   Social Needs    Financial resource strain: Not on file    Food insecurity:     Worry: Not on file     Inability: Not on file    Transportation needs:     Medical: Not on file     Non-medical: Not on file   Tobacco Use    Smoking status: Never Smoker    Smokeless tobacco: Never Used   Substance and Sexual Activity    Alcohol use: No    Drug use: No    Sexual activity: Yes     Partners: Male     Birth control/protection: None   Lifestyle    Physical activity:     Days per week: Not on file     Minutes per session: Not on file    Stress: Not on file   Relationships    Social connections:     Talks on phone: Not on file     Gets together: Not on file     Attends Hinduism service: Not on file     Active member of club or organization: Not on file     Attends meetings of clubs or organizations: Not on file     Relationship status: Not on file   Other Topics Concern    Not on file   Social History Narrative    Not on file       No current facility-administered medications for this encounter.      Current Outpatient Medications   Medication Sig Dispense Refill    prenatal vit calc,iron,folic (PRENATAL VITAMIN ORAL) Take by mouth.      HYDROcodone-acetaminophen (NORCO) 5-325 mg per tablet Take 1 tablet by mouth every 4 (four) hours as needed. 20 tablet 0    ibuprofen (ADVIL,MOTRIN) 800 MG tablet Take 1 tablet (800 mg total) by mouth every 8 (eight) hours. for 10 days 30 tablet 0    labetalol (NORMODYNE) 200 MG tablet Take 1 tablet (200 mg total) by mouth every 12 (twelve) hours. 60 tablet 11       Review of patient's allergies indicates:  No Known Allergies     OB History    Para Term  AB Living   2 2 1 1   2   SAB TAB Ectopic Multiple Live Births         0 1      # Outcome Date GA Lbr Tre/2nd Weight Sex Delivery Anes PTL Lv   2 Term 20 38w1d  3.16 kg (6 lb 15.5 oz) M CS-LTranv Spinal N  LUANN   1  12 36w0d  0.907 kg (2 lb) F CS-Unspec EPI Y       Complications: Hypertension affecting pregnancy, Pre-eclampsia      Gyn history: no abnormal pap/std    Vitals:    20 0821 20 1015 20 1210 20 1618   BP: (!) 159/87 (!) 148/73 131/85 (!) 145/65   BP Location: Left arm Right arm Right arm Left arm   Patient Position: Sitting Sitting Sitting Sitting   Pulse: 104 89 100 89   Resp: 18  18 18   Temp: 97.5 °F (36.4 °C)  98.7 °F (37.1 °C) 98.5 °F (36.9 °C)   TempSrc: Oral  Oral Oral   SpO2:       Weight:       Height:         FHT: cat 1  TOCO: none    PE:  GENERAL: NAD, A&O  CHEST: normal effort  ABDOMEN: soft, gravid  : deferred  EXT: none    A/P  1. IUP 38w1d   2. Chronic HTN  3. Incompetent cervix  4. Previous csection desires repeat-to OR for repeat csection

## 2020-02-25 ENCOUNTER — TELEPHONE (OUTPATIENT)
Dept: OBSTETRICS AND GYNECOLOGY | Facility: CLINIC | Age: 35
End: 2020-02-25

## 2020-02-25 ENCOUNTER — OFFICE VISIT (OUTPATIENT)
Dept: OBSTETRICS AND GYNECOLOGY | Facility: CLINIC | Age: 35
End: 2020-02-25
Payer: COMMERCIAL

## 2020-02-25 VITALS
BODY MASS INDEX: 40.54 KG/M2 | SYSTOLIC BLOOD PRESSURE: 136 MMHG | DIASTOLIC BLOOD PRESSURE: 78 MMHG | WEIGHT: 243.63 LBS

## 2020-02-25 DIAGNOSIS — Z98.891 STATUS POST REPEAT LOW TRANSVERSE CESAREAN SECTION: Primary | ICD-10-CM

## 2020-02-25 PROCEDURE — 99999 PR PBB SHADOW E&M-EST. PATIENT-LVL II: CPT | Mod: PBBFAC,,,

## 2020-02-25 PROCEDURE — 0503F PR POSTPARTUM CARE VISIT: ICD-10-PCS | Mod: S$GLB,,, | Performed by: OBSTETRICS & GYNECOLOGY

## 2020-02-25 PROCEDURE — 0503F POSTPARTUM CARE VISIT: CPT | Mod: S$GLB,,, | Performed by: OBSTETRICS & GYNECOLOGY

## 2020-02-25 PROCEDURE — 99999 PR PBB SHADOW E&M-EST. PATIENT-LVL II: ICD-10-PCS | Mod: PBBFAC,,,

## 2020-02-25 NOTE — TELEPHONE ENCOUNTER
Called patient and she and her partner stated that the incision is draining (not all blood, but fluid with blood mix it) and is enough to get on her clothes.  Instructed patient to put pad between incision bandage and her clothes and made appointment for her to be evaluated by PA this afternoon. Patient and partner verbalized understanding.

## 2020-02-25 NOTE — TELEPHONE ENCOUNTER
----- Message from Ave Perkins sent at 2/25/2020  8:27 AM CST -----  Contact: pt  Bandage is leaking     Type:  Needs Medical Advice    Who Called: the pt   Symptoms (please be specific): Bandage / incision  leaking   How long has patient had these symptoms: n/a  Pharmacy name and phone #: n/a  Would the patient rather a call back or a response via [facility]? Call back  Best Call Back Number: [phone]  Additional Information: n/a

## 2020-02-26 NOTE — PROGRESS NOTES
Subjective:       Patient ID: Malgorzata Yoder is a 35 y.o. female.    Chief Complaint:  Post-op Evaluation (drainage)      History of Present Illness  HPI  Postoperative Follow-up  Patient presents to the clinic 5 days status post  for repeat . Eating a regular diet without difficulty. Bowel movements are normal. Pain is controlled with current analgesics. Medications being used: ibuprofen (OTC) and norco.  She reports having blood drainage come from her incision from the top part of her dressing yesterday. Denies fever. Pain is controlled no worsening since surgery. Otherwise doing very well.    GYN & OB History  Patient's last menstrual period was 2019 (exact date).   Date of Last Pap: 7/15/2019    OB History    Para Term  AB Living   2 2 1 1   2   SAB TAB Ectopic Multiple Live Births         0 1      # Outcome Date GA Lbr Tre/2nd Weight Sex Delivery Anes PTL Lv   2 Term 20 38w1d  3.16 kg (6 lb 15.5 oz) M CS-LTranv Spinal N LUANN   1  12 36w0d  0.907 kg (2 lb) F CS-Unspec EPI Y       Complications: Hypertension affecting pregnancy, Pre-eclampsia       Review of Systems  Review of Systems   Constitutional: Negative for activity change, chills, fatigue and fever.   Respiratory: Negative for cough.    Cardiovascular: Negative for chest pain and leg swelling.   Gastrointestinal: Negative for abdominal pain, constipation, nausea and vomiting.   Genitourinary: Negative for dysuria, frequency, hematuria, pelvic pain, urgency, vaginal bleeding and vaginal discharge.   Integumentary:  Negative for rash.           Objective:    Physical Exam:   Constitutional: She is oriented to person, place, and time. She appears well-developed and well-nourished. No distress.       Cardiovascular: Normal rate, regular rhythm, normal heart sounds and intact distal pulses.    No murmur heard.   Pulmonary/Chest: Effort normal and breath sounds normal. No respiratory distress.  She has no wheezes. She has no rales.        Abdominal: Soft. Bowel sounds are normal. She exhibits abdominal incision (Aquacel dressing in place, complete saturated with drainage to superior edge and nonadherent of same, dressing removed and incision is intact with 2 cm superficial separation of incision, no active drainage, sterile probe cannot probe any pocket). She exhibits no distension. There is no tenderness. There is no guarding.   No erythema or induration of wound             Musculoskeletal: Moves all extremeties. She exhibits no edema.   No calf tenderness       Neurological: She is alert and oriented to person, place, and time.    Skin: Skin is warm and dry. No rash noted. She is not diaphoretic.           Problem List Items Addressed This Visit        Obstetric    Status post repeat low transverse  section - Primary      Concern for amount of drainage to date from incision however today I am unable to probe a fluid pocket and the wound remains intact, healing well. Discussed with patient keep gauze or pad to incision to help keep dry. She will inform us if more than just spotting on pad, discussed warning signs of wound infection    Patient is scheduled for postpartum visit on 3/26 with Dr Smith.    Reviewed all restrictions & limitations with the patient. Advised to call clinic in event of fever, redness or drainage around the incision, worsening pain, or any any concerning issues or symptoms.  Instructed the importance of showering daily, no tub baths, using an antibacterial soap.  Patient verbalized understanding.

## 2020-02-27 ENCOUNTER — TELEPHONE (OUTPATIENT)
Dept: LACTATION | Facility: CLINIC | Age: 35
End: 2020-02-27

## 2020-02-27 NOTE — TELEPHONE ENCOUNTER
Copied from baby's chart:  Dr. Woods referred patient for lactation services. Called mother to discuss needs, and OPC scheduled for 3/5 at 1330. Mother reports baby feeds mostly on left breast due to flat nipple on right breast. She has been pumping right breast with manual pump and was unaware of breast pump availability through insurance. Provided with [employer] phone number to help with obtaining pump through insurance and provided lactation warmline number should she need further information on how to obtain breast pump through insurance company.   Baby primarily feeds at breast, and has been supplemented with bottle x2. When trying to get more information, baby began to cry in background so call needed to be discontinued at this time. Encouraged mother to watch [facility] [employer] video online and to call lactation when she is able for further guidance. Voices understanding.

## 2020-02-28 ENCOUNTER — PATIENT MESSAGE (OUTPATIENT)
Dept: LACTATION | Facility: CLINIC | Age: 35
End: 2020-02-28

## 2020-03-23 ENCOUNTER — PATIENT MESSAGE (OUTPATIENT)
Dept: OBSTETRICS AND GYNECOLOGY | Facility: CLINIC | Age: 35
End: 2020-03-23

## 2020-03-26 ENCOUNTER — PATIENT MESSAGE (OUTPATIENT)
Dept: OBSTETRICS AND GYNECOLOGY | Facility: CLINIC | Age: 35
End: 2020-03-26

## 2020-03-26 ENCOUNTER — POSTPARTUM VISIT (OUTPATIENT)
Dept: OBSTETRICS AND GYNECOLOGY | Facility: CLINIC | Age: 35
End: 2020-03-26
Payer: COMMERCIAL

## 2020-03-26 VITALS
SYSTOLIC BLOOD PRESSURE: 126 MMHG | DIASTOLIC BLOOD PRESSURE: 88 MMHG | BODY MASS INDEX: 37.65 KG/M2 | HEIGHT: 65 IN | WEIGHT: 226 LBS

## 2020-03-26 DIAGNOSIS — I10 ESSENTIAL HYPERTENSION: ICD-10-CM

## 2020-03-26 PROCEDURE — 0503F POSTPARTUM CARE VISIT: CPT | Mod: S$GLB,,, | Performed by: OBSTETRICS & GYNECOLOGY

## 2020-03-26 PROCEDURE — 99999 PR PBB SHADOW E&M-EST. PATIENT-LVL III: ICD-10-PCS | Mod: PBBFAC,,, | Performed by: OBSTETRICS & GYNECOLOGY

## 2020-03-26 PROCEDURE — 99999 PR PBB SHADOW E&M-EST. PATIENT-LVL III: CPT | Mod: PBBFAC,,, | Performed by: OBSTETRICS & GYNECOLOGY

## 2020-03-26 PROCEDURE — 0503F PR POSTPARTUM CARE VISIT: ICD-10-PCS | Mod: S$GLB,,, | Performed by: OBSTETRICS & GYNECOLOGY

## 2020-03-26 RX ORDER — IBUPROFEN 800 MG/1
1 TABLET ORAL DAILY
COMMUNITY
Start: 2020-02-22 | End: 2020-07-31

## 2020-03-26 NOTE — PROGRESS NOTES
"CC: Post-partum follow-up    Malgorzata Yoder is a 35 y.o. female  who presents for post-partum visit.  She is S/P a repeat csection .  She and the baby are doing well.  No pain.  No fever.   No bowel / bladder complaints.    Delivery Date: 2020  Delivery provider: Vandana Smith  Gender: male  Birth Weight: 6 pounds 15 ounces  Breast Feeding: NO  Depression: NO  Contraception: oral contraceptives (estrogen/progesterone)    Pregnancy was complicated by:  Incompetent cervix, CHTN, h/o severe preE in previous pregnancy, previous csection    /88   Ht 5' 5" (1.651 m)   Wt 102.5 kg (225 lb 15.5 oz)   LMP 2019 (Exact Date)   Breastfeeding? Yes   BMI 37.60 kg/m²     ROS:  GENERAL: No fever, chills, fatigability.  VULVAR: No pain, no lesions and no itching.  VAGINAL: No relaxation, no itching, no discharge, no abnormal bleeding and no lesions.  ABDOMEN: No abdominal pain. Denies nausea. Denies vomiting. No diarrhea. No constipation  BREAST: Denies pain. No lumps. No discharge.  URINARY: No incontinence, no nocturia, no frequency and no dysuria.  CARDIOVASCULAR: No chest pain. No shortness of breath. No leg cramps.  NEUROLOGICAL: No headaches. No vision changes.    PHYSICAL EXAM:  GENERAL: NAD  NECK: no thyromegaly  BREASTS: no abnormal masses/nontender  ABDOMEN:  Soft, non-tender, non-distended well healed incision  VULVA:  Normal, no lesions  VAGINA: Normal vaginal mucosa/cervix. No abnormal discharge  CERVIX:  Without lesions, polyps or tenderness.  UTERUS:  Normal size, shape, consistency, no mass or tenderness.  ADNEXA:  Normal in size without mass or tenderness    IMP:  Doing well S/P repeat cs  Instructions / precautions reviewed  Contraceptive counseling      PLAN:  May resume normal activities  Return: [facility]    "

## 2020-04-24 ENCOUNTER — TELEPHONE (OUTPATIENT)
Dept: OBSTETRICS AND GYNECOLOGY | Facility: CLINIC | Age: 35
End: 2020-04-24

## 2020-04-24 NOTE — TELEPHONE ENCOUNTER
Patient calling inquiring about the fax she dropped off with  at [employer] on Monday. The form needs to be filled out and signed for her to go back to work. Informed patient that I will call over to [employer] to check on the paperwork. Patient stated that paperwork needs to be faxed to [phone] ATTN: Jyoti Jensen. Informed patient I will fax over today. Patient verbalized understanding.

## 2020-07-27 ENCOUNTER — TELEPHONE (OUTPATIENT)
Dept: OBSTETRICS AND GYNECOLOGY | Facility: CLINIC | Age: 35
End: 2020-07-27

## 2020-07-27 NOTE — TELEPHONE ENCOUNTER
----- Message from Anderson Ansari sent at 7/27/2020 12:05 PM CDT -----  Contact: self  Type:  Sooner Apoointment Request    Caller is requesting a sooner appointment.  Caller declined first available appointment listed below.  Caller will not accept being placed on the waitlist and is requesting a message be sent to doctor.  Name of Caller:Malgorzata Yoder   When is the first available appointment?2020  Symptoms:pregnancy confirmation  Would the patient rather a call back or a response via [facility]? Call back  Best Call Back Number:[phone]  Additional Information:

## 2020-07-27 NOTE — TELEPHONE ENCOUNTER
----- Message from Katerin Murray sent at 7/27/2020  9:39 AM CDT -----  Contact: [phone]  Patient called in regards needing to scheduled a f/u appointment with Vandana Smith MD  Thank you

## 2020-07-28 ENCOUNTER — TELEPHONE (OUTPATIENT)
Dept: OBSTETRICS AND GYNECOLOGY | Facility: CLINIC | Age: 35
End: 2020-07-28

## 2020-07-28 NOTE — TELEPHONE ENCOUNTER
----- Message from Brando Galaviz sent at 7/28/2020 10:11 AM CDT -----  Regarding: rtn call  Contact: Malgorzata  Type:  Patient Returning Call    Who Called:Malgorzata Yoder   Who Left Message for Patient:ROBIN VILLASENOR  Does the patient know what this is regarding?:no  Would the patient rather a call back or a response via [facility]? Call back   Best Call Back Number:[phone]  Additional Information:

## 2020-07-29 ENCOUNTER — TELEPHONE (OUTPATIENT)
Dept: OBSTETRICS AND GYNECOLOGY | Facility: CLINIC | Age: 35
End: 2020-07-29

## 2020-07-29 NOTE — TELEPHONE ENCOUNTER
----- Message from Rui Mascorro sent at 7/29/2020  8:29 AM CDT -----  Pt would like to schedule  an appt for pregnancy confirmation.  Please call back at   [phone].  Md Celine

## 2020-07-31 ENCOUNTER — OFFICE VISIT (OUTPATIENT)
Dept: OBSTETRICS AND GYNECOLOGY | Facility: CLINIC | Age: 35
End: 2020-07-31
Payer: COMMERCIAL

## 2020-07-31 ENCOUNTER — LAB VISIT (OUTPATIENT)
Dept: LAB | Facility: HOSPITAL | Age: 35
End: 2020-07-31
Attending: FAMILY MEDICINE
Payer: COMMERCIAL

## 2020-07-31 VITALS
DIASTOLIC BLOOD PRESSURE: 90 MMHG | WEIGHT: 245.13 LBS | HEIGHT: 65 IN | BODY MASS INDEX: 40.84 KG/M2 | SYSTOLIC BLOOD PRESSURE: 170 MMHG

## 2020-07-31 DIAGNOSIS — O09.299 HX OF PREECLAMPSIA, PRIOR PREGNANCY, CURRENTLY PREGNANT: ICD-10-CM

## 2020-07-31 DIAGNOSIS — O09.299 HISTORY OF PRE-ECLAMPSIA IN PRIOR PREGNANCY, CURRENTLY PREGNANT: ICD-10-CM

## 2020-07-31 DIAGNOSIS — N91.2 AMENORRHEA: ICD-10-CM

## 2020-07-31 DIAGNOSIS — Z32.01 POSITIVE PREGNANCY TEST: ICD-10-CM

## 2020-07-31 DIAGNOSIS — Z32.01 POSITIVE PREGNANCY TEST: Primary | ICD-10-CM

## 2020-07-31 DIAGNOSIS — I10 ESSENTIAL HYPERTENSION: ICD-10-CM

## 2020-07-31 DIAGNOSIS — O26.841 UTERINE SIZE-DATE DISCREPANCY IN FIRST TRIMESTER: ICD-10-CM

## 2020-07-31 DIAGNOSIS — Z98.891 STATUS POST REPEAT LOW TRANSVERSE CESAREAN SECTION: ICD-10-CM

## 2020-07-31 LAB
ABO + RH BLD: NORMAL
ALBUMIN SERPL BCP-MCNC: 3.7 G/DL (ref 3.5–5.2)
ALP SERPL-CCNC: 65 U/L (ref 55–135)
ALT SERPL W/O P-5'-P-CCNC: 19 U/L (ref 10–44)
ANION GAP SERPL CALC-SCNC: 9 MMOL/L (ref 8–16)
AST SERPL-CCNC: 16 U/L (ref 10–40)
BASOPHILS # BLD AUTO: 0.03 K/UL (ref 0–0.2)
BASOPHILS NFR BLD: 0.5 % (ref 0–1.9)
BILIRUB SERPL-MCNC: 0.2 MG/DL (ref 0.1–1)
BLD GP AB SCN CELLS X3 SERPL QL: NORMAL
BUN SERPL-MCNC: 9 MG/DL (ref 6–20)
CALCIUM SERPL-MCNC: 9.1 MG/DL (ref 8.7–10.5)
CHLORIDE SERPL-SCNC: 105 MMOL/L (ref 95–110)
CO2 SERPL-SCNC: 24 MMOL/L (ref 23–29)
CREAT SERPL-MCNC: 0.8 MG/DL (ref 0.5–1.4)
CREAT UR-MCNC: 82 MG/DL (ref 15–325)
DIFFERENTIAL METHOD: ABNORMAL
EOSINOPHIL # BLD AUTO: 0.3 K/UL (ref 0–0.5)
EOSINOPHIL NFR BLD: 4.5 % (ref 0–8)
ERYTHROCYTE [DISTWIDTH] IN BLOOD BY AUTOMATED COUNT: 13.6 % (ref 11.5–14.5)
EST. GFR  (AFRICAN AMERICAN): >60 ML/MIN/1.73 M^2
EST. GFR  (NON AFRICAN AMERICAN): >60 ML/MIN/1.73 M^2
GLUCOSE SERPL-MCNC: 103 MG/DL (ref 70–110)
HCT VFR BLD AUTO: 37.5 % (ref 37–48.5)
HGB BLD-MCNC: 11.7 G/DL (ref 12–16)
IMM GRANULOCYTES # BLD AUTO: 0.01 K/UL (ref 0–0.04)
IMM GRANULOCYTES NFR BLD AUTO: 0.2 % (ref 0–0.5)
LYMPHOCYTES # BLD AUTO: 2.4 K/UL (ref 1–4.8)
LYMPHOCYTES NFR BLD: 40.7 % (ref 18–48)
MCH RBC QN AUTO: 29.4 PG (ref 27–31)
MCHC RBC AUTO-ENTMCNC: 31.2 G/DL (ref 32–36)
MCV RBC AUTO: 94 FL (ref 82–98)
MONOCYTES # BLD AUTO: 0.4 K/UL (ref 0.3–1)
MONOCYTES NFR BLD: 6.4 % (ref 4–15)
NEUTROPHILS # BLD AUTO: 2.8 K/UL (ref 1.8–7.7)
NEUTROPHILS NFR BLD: 47.7 % (ref 38–73)
NRBC BLD-RTO: 0 /100 WBC
PLATELET # BLD AUTO: 227 K/UL (ref 150–350)
PMV BLD AUTO: 11.8 FL (ref 9.2–12.9)
POTASSIUM SERPL-SCNC: 3.1 MMOL/L (ref 3.5–5.1)
PROT SERPL-MCNC: 7.4 G/DL (ref 6–8.4)
PROT UR-MCNC: <7 MG/DL (ref 0–15)
PROT/CREAT UR: NORMAL MG/G{CREAT} (ref 0–0.2)
RBC # BLD AUTO: 3.98 M/UL (ref 4–5.4)
SODIUM SERPL-SCNC: 138 MMOL/L (ref 136–145)
WBC # BLD AUTO: 5.82 K/UL (ref 3.9–12.7)

## 2020-07-31 PROCEDURE — 80053 COMPREHEN METABOLIC PANEL: CPT

## 2020-07-31 PROCEDURE — 99999 PR PBB SHADOW E&M-EST. PATIENT-LVL III: CPT | Mod: PBBFAC,,, | Performed by: MIDWIFE

## 2020-07-31 PROCEDURE — 99212 OFFICE O/P EST SF 10 MIN: CPT | Mod: S$GLB,,, | Performed by: MIDWIFE

## 2020-07-31 PROCEDURE — 86703 HIV-1/HIV-2 1 RESULT ANTBDY: CPT

## 2020-07-31 PROCEDURE — 86762 RUBELLA ANTIBODY: CPT

## 2020-07-31 PROCEDURE — 3008F BODY MASS INDEX DOCD: CPT | Mod: CPTII,S$GLB,, | Performed by: MIDWIFE

## 2020-07-31 PROCEDURE — 36415 COLL VENOUS BLD VENIPUNCTURE: CPT

## 2020-07-31 PROCEDURE — 87086 URINE CULTURE/COLONY COUNT: CPT

## 2020-07-31 PROCEDURE — 99999 PR PBB SHADOW E&M-EST. PATIENT-LVL III: ICD-10-PCS | Mod: PBBFAC,,, | Performed by: MIDWIFE

## 2020-07-31 PROCEDURE — 85025 COMPLETE CBC W/AUTO DIFF WBC: CPT

## 2020-07-31 PROCEDURE — 86592 SYPHILIS TEST NON-TREP QUAL: CPT

## 2020-07-31 PROCEDURE — 86850 RBC ANTIBODY SCREEN: CPT

## 2020-07-31 PROCEDURE — 80074 ACUTE HEPATITIS PANEL: CPT

## 2020-07-31 PROCEDURE — 3008F PR BODY MASS INDEX (BMI) DOCUMENTED: ICD-10-PCS | Mod: CPTII,S$GLB,, | Performed by: MIDWIFE

## 2020-07-31 PROCEDURE — 99212 PR OFFICE/OUTPT VISIT, EST, LEVL II, 10-19 MIN: ICD-10-PCS | Mod: S$GLB,,, | Performed by: MIDWIFE

## 2020-07-31 PROCEDURE — 84156 ASSAY OF PROTEIN URINE: CPT

## 2020-07-31 NOTE — PROGRESS NOTES
Malgorzata Yoder  complains of amenorrhea.  Patient's last menstrual period was 2020.. UPT is Positive.  She is taking a PNV.  She denies nausea/vomiting. Denies headache, visual disturbances or epigastric pain. Pt took BP meds 30 minutes prior to visit and did not take it this morning.    Past Medical History:   Diagnosis Date    Asthma     childhood    Cervical cerclage suture present in second trimester 9/10/2019    12:00. Dominique cerclage    H/O incompetent cervix, currently pregnant, second trimester 9/10/2019    History of cerclage, currently pregnant 2019. Late prenatal care at 4 months, incidental finding of membranes in vaginal vault, cerclage placed per M - report requested    History of  delivery, currently pregnant 2019 failed  Cytotec induction of labor at 37 weeks secondary to  severe preeclampsia - [facility] -Dr. Cass Haywood   C/S -(uterine incision not documented) secondary to severe preeclampsia and recurrent late deceleration with SGA baby 4 lb 15 oz.  Dense fibrotic scar tissue was noted during surgery    History of ovarian cyst     Hypertension     Hypokalemia 2014    Paresthesia and pain of both upper extremities 2019     Past Surgical History:   Procedure Laterality Date    CARPAL TUNNEL RELEASE Bilateral     CERVICAL CERCLAGE N/A 9/10/2019    Procedure: CERCLAGE, CERVIX;  Surgeon: Vandana Smith MD;  Location: [facility] OR;  Service: OB/GYN;  Laterality: N/A;     SECTION       SECTION N/A 2020    Procedure:  SECTION;  Surgeon: Vandana Smith MD;  Location: [facility] L&D;  Service: OB/GYN;  Laterality: N/A;    MOUTH SURGERY      OOPHORECTOMY Right 2010     Family History   Problem Relation Age of Onset    Aneurysm Mother     Cancer Maternal Aunt         brain tumor    Breast cancer Maternal Grandmother     Colon cancer Neg Hx     Ovarian cancer Neg Hx     Uterine  cancer Neg Hx     Cervical cancer Neg Hx      Review of patient's allergies indicates:  No Known Allergies  Social History     Socioeconomic History    Marital status: Single     Spouse name: Not on file    Number of children: 1    Years of education: Not on file    Highest education level: Not on file   Occupational History    Occupation: [employer]     Employer: [id number]   Social Needs    Financial resource strain: Not on file    Food insecurity     Worry: Not on file     Inability: Not on file    Transportation needs     Medical: Not on file     Non-medical: Not on file   Tobacco Use    Smoking status: Never Smoker    Smokeless tobacco: Never Used   Substance and Sexual Activity    Alcohol use: No    Drug use: No    Sexual activity: Yes     Partners: Male     Birth control/protection: None   Lifestyle    Physical activity     Days per week: Not on file     Minutes per session: Not on file    Stress: Not on file   Relationships    Social connections     Talks on phone: Not on file     Gets together: Not on file     Attends Congregational service: Not on file     Active member of club or organization: Not on file     Attends meetings of clubs or organizations: Not on file     Relationship status: Not on file   Other Topics Concern    Not on file   Social History Narrative    Not on file       ROS:  GENERAL: No fever, chills, fatigability or weight loss.  VULVAR: No pain, no lesions and no itching.  VAGINAL: No relaxation, no itching, no discharge, no abnormal bleeding and no lesions.  ABDOMEN: No abdominal pain. Denies nausea. Denies vomiting. No diarrhea. No constipation  BREAST: Denies pain. No lumps. No discharge.  URINARY: No incontinence, no nocturia, no frequency and no dysuria.  CARDIOVASCULAR: No chest pain. No shortness of breath. No leg cramps.  NEUROLOGICAL: no headaches. No vision changes.      Vitals:    07/31/20 1500   BP: (!) 170/90     GENERAL: healthy, alert, mild distress,  erick Mcgarry was seen today for possible pregnancy.    Diagnoses and all orders for this visit:    Positive pregnancy test  -     Rubella Antibody, IgG; Future  -     HIV 1/2 Ag/Ab (4th Gen); Future  -     RPR; Future  -     C. trachomatis/N. gonorrhoeae by AMP DNA  -     Type & Screen - Ob Profile; Future  -     CBC auto differential; Future  -     Urine culture  -     Hepatitis Panel, Acute; Future    Amenorrhea  -     POCT urine pregnancy    Uterine size-date discrepancy in first trimester  -     US OB/GYN Procedure ([facility]); Future    Essential hypertension    Hx of preeclampsia, prior pregnancy, currently pregnant  -     Comprehensive metabolic panel; Future  -     Protein / creatinine ratio, urine    Status post repeat low transverse  section    Labetolol 200 mg bid  Follow up in 2 wks with US and New OB appt

## 2020-08-01 PROBLEM — Z67.91 RH NEGATIVE STATE IN ANTEPARTUM PERIOD, FIRST TRIMESTER: Status: ACTIVE | Noted: 2020-08-01

## 2020-08-01 PROBLEM — O26.891 RH NEGATIVE STATE IN ANTEPARTUM PERIOD, FIRST TRIMESTER: Status: ACTIVE | Noted: 2020-08-01

## 2020-08-01 LAB — RPR SER QL: NORMAL

## 2020-08-02 LAB — BACTERIA UR CULT: NORMAL

## 2020-08-03 LAB
HAV IGM SERPL QL IA: NEGATIVE
HBV CORE IGM SERPL QL IA: NEGATIVE
HBV SURFACE AG SERPL QL IA: NEGATIVE
HCV AB SERPL QL IA: NEGATIVE
RUBV IGG SER-ACNC: 38.4 IU/ML
RUBV IGG SER-IMP: REACTIVE

## 2020-08-05 PROBLEM — O09.299 HISTORY OF PRE-ECLAMPSIA IN PRIOR PREGNANCY, CURRENTLY PREGNANT: Status: ACTIVE | Noted: 2020-08-05

## 2020-08-05 PROBLEM — O09.299 HX OF PREECLAMPSIA, PRIOR PREGNANCY, CURRENTLY PREGNANT: Status: RESOLVED | Noted: 2019-07-02 | Resolved: 2020-08-05

## 2020-08-05 LAB — HIV 1+2 AB+HIV1 P24 AG SERPL QL IA: NEGATIVE

## 2020-08-11 ENCOUNTER — INITIAL PRENATAL (OUTPATIENT)
Dept: OBSTETRICS AND GYNECOLOGY | Facility: CLINIC | Age: 35
End: 2020-08-11
Payer: COMMERCIAL

## 2020-08-11 ENCOUNTER — PROCEDURE VISIT (OUTPATIENT)
Dept: OBSTETRICS AND GYNECOLOGY | Facility: CLINIC | Age: 35
End: 2020-08-11
Payer: COMMERCIAL

## 2020-08-11 VITALS — BODY MASS INDEX: 40.36 KG/M2 | SYSTOLIC BLOOD PRESSURE: 122 MMHG | WEIGHT: 242.5 LBS | DIASTOLIC BLOOD PRESSURE: 74 MMHG

## 2020-08-11 DIAGNOSIS — Z34.90 EARLY STAGE OF PREGNANCY: Primary | ICD-10-CM

## 2020-08-11 DIAGNOSIS — Z3A.01 6 WEEKS GESTATION OF PREGNANCY: ICD-10-CM

## 2020-08-11 DIAGNOSIS — O26.841 UTERINE SIZE-DATE DISCREPANCY IN FIRST TRIMESTER: ICD-10-CM

## 2020-08-11 PROCEDURE — 0500F INITIAL PRENATAL CARE VISIT: CPT | Mod: S$GLB,,, | Performed by: MIDWIFE

## 2020-08-11 PROCEDURE — 0500F PR INITIAL PRENATAL CARE VISIT: ICD-10-PCS | Mod: S$GLB,,, | Performed by: MIDWIFE

## 2020-08-11 PROCEDURE — 76801 OB US < 14 WKS SINGLE FETUS: CPT | Mod: S$GLB,,, | Performed by: OBSTETRICS & GYNECOLOGY

## 2020-08-11 PROCEDURE — 99999 PR PBB SHADOW E&M-EST. PATIENT-LVL II: ICD-10-PCS | Mod: PBBFAC,,, | Performed by: MIDWIFE

## 2020-08-11 PROCEDURE — 99999 PR PBB SHADOW E&M-EST. PATIENT-LVL II: CPT | Mod: PBBFAC,,, | Performed by: MIDWIFE

## 2020-08-11 PROCEDURE — 76801 PR US, OB <14WKS, TRANSABD, SINGLE GESTATION: ICD-10-PCS | Mod: S$GLB,,, | Performed by: OBSTETRICS & GYNECOLOGY

## 2020-08-11 NOTE — PROGRESS NOTES
Pt here for initial OB, US shows 6 wk gestation, heart rate 79-80, discussed possible etiology of low heart rate, will repeat at [facility] in 1 wk for viability

## 2020-08-15 ENCOUNTER — HOSPITAL ENCOUNTER (EMERGENCY)
Facility: HOSPITAL | Age: 35
Discharge: HOME | End: 2020-08-15
Attending: EMERGENCY MEDICINE
Payer: COMMERCIAL

## 2020-08-15 VITALS
DIASTOLIC BLOOD PRESSURE: 80 MMHG | HEART RATE: 84 BPM | BODY MASS INDEX: 41.29 KG/M2 | RESPIRATION RATE: 20 BRPM | WEIGHT: 247.81 LBS | OXYGEN SATURATION: 100 % | SYSTOLIC BLOOD PRESSURE: 159 MMHG | HEIGHT: 65 IN | TEMPERATURE: 98 F

## 2020-08-15 DIAGNOSIS — O46.90 VAGINAL BLEEDING IN PREGNANCY: ICD-10-CM

## 2020-08-15 DIAGNOSIS — O20.0 THREATENED MISCARRIAGE IN EARLY PREGNANCY: Primary | ICD-10-CM

## 2020-08-15 LAB
ABO + RH BLD: NORMAL
ALBUMIN SERPL BCP-MCNC: 3.7 G/DL (ref 3.5–5.2)
ALP SERPL-CCNC: 70 U/L (ref 55–135)
ALT SERPL W/O P-5'-P-CCNC: 17 U/L (ref 10–44)
ANION GAP SERPL CALC-SCNC: 10 MMOL/L (ref 8–16)
AST SERPL-CCNC: 17 U/L (ref 10–40)
B-HCG UR QL: POSITIVE
BASOPHILS # BLD AUTO: 0.02 K/UL (ref 0–0.2)
BASOPHILS NFR BLD: 0.4 % (ref 0–1.9)
BILIRUB SERPL-MCNC: 0.3 MG/DL (ref 0.1–1)
BILIRUB UR QL STRIP: NEGATIVE
BLD GP AB SCN CELLS X3 SERPL QL: NORMAL
BUN SERPL-MCNC: 9 MG/DL (ref 6–20)
CALCIUM SERPL-MCNC: 9.4 MG/DL (ref 8.7–10.5)
CHLORIDE SERPL-SCNC: 102 MMOL/L (ref 95–110)
CLARITY UR: CLEAR
CO2 SERPL-SCNC: 27 MMOL/L (ref 23–29)
COLOR UR: YELLOW
CREAT SERPL-MCNC: 0.9 MG/DL (ref 0.5–1.4)
DIFFERENTIAL METHOD: ABNORMAL
EOSINOPHIL # BLD AUTO: 0.2 K/UL (ref 0–0.5)
EOSINOPHIL NFR BLD: 2.8 % (ref 0–8)
ERYTHROCYTE [DISTWIDTH] IN BLOOD BY AUTOMATED COUNT: 13.8 % (ref 11.5–14.5)
EST. GFR  (AFRICAN AMERICAN): >60 ML/MIN/1.73 M^2
EST. GFR  (NON AFRICAN AMERICAN): >60 ML/MIN/1.73 M^2
GLUCOSE SERPL-MCNC: 83 MG/DL (ref 70–110)
GLUCOSE UR QL STRIP: NEGATIVE
HCG INTACT+B SERPL-ACNC: 2526 MIU/ML
HCT VFR BLD AUTO: 35.6 % (ref 37–48.5)
HGB BLD-MCNC: 11.4 G/DL (ref 12–16)
HGB UR QL STRIP: ABNORMAL
IMM GRANULOCYTES # BLD AUTO: 0.02 K/UL (ref 0–0.04)
IMM GRANULOCYTES NFR BLD AUTO: 0.4 % (ref 0–0.5)
INJECT RH IG VOL PATIENT: NORMAL ML
KETONES UR QL STRIP: NEGATIVE
LEUKOCYTE ESTERASE UR QL STRIP: NEGATIVE
LYMPHOCYTES # BLD AUTO: 2 K/UL (ref 1–4.8)
LYMPHOCYTES NFR BLD: 35.2 % (ref 18–48)
MCH RBC QN AUTO: 29.6 PG (ref 27–31)
MCHC RBC AUTO-ENTMCNC: 32 G/DL (ref 32–36)
MCV RBC AUTO: 93 FL (ref 82–98)
MONOCYTES # BLD AUTO: 0.4 K/UL (ref 0.3–1)
MONOCYTES NFR BLD: 7.2 % (ref 4–15)
NEUTROPHILS # BLD AUTO: 3.1 K/UL (ref 1.8–7.7)
NEUTROPHILS NFR BLD: 54 % (ref 38–73)
NITRITE UR QL STRIP: NEGATIVE
NRBC BLD-RTO: 0 /100 WBC
PH UR STRIP: 7 [PH] (ref 5–8)
PLATELET # BLD AUTO: 221 K/UL (ref 150–350)
PMV BLD AUTO: 11.5 FL (ref 9.2–12.9)
POTASSIUM SERPL-SCNC: 3.2 MMOL/L (ref 3.5–5.1)
PROT SERPL-MCNC: 7.7 G/DL (ref 6–8.4)
PROT UR QL STRIP: NEGATIVE
RBC # BLD AUTO: 3.85 M/UL (ref 4–5.4)
SODIUM SERPL-SCNC: 139 MMOL/L (ref 136–145)
SP GR UR STRIP: 1.02 (ref 1–1.03)
URN SPEC COLLECT METH UR: ABNORMAL
UROBILINOGEN UR STRIP-ACNC: NEGATIVE EU/DL
WBC # BLD AUTO: 5.68 K/UL (ref 3.9–12.7)

## 2020-08-15 PROCEDURE — 63600519 RHOGAM PHARM REV CODE 636 ALT 250 W HCPCS: Performed by: NURSE PRACTITIONER

## 2020-08-15 PROCEDURE — 85025 COMPLETE CBC W/AUTO DIFF WBC: CPT

## 2020-08-15 PROCEDURE — 99284 EMERGENCY DEPT VISIT MOD MDM: CPT | Mod: 25

## 2020-08-15 PROCEDURE — 96372 THER/PROPH/DIAG INJ SC/IM: CPT

## 2020-08-15 PROCEDURE — 36415 COLL VENOUS BLD VENIPUNCTURE: CPT

## 2020-08-15 PROCEDURE — 80053 COMPREHEN METABOLIC PANEL: CPT

## 2020-08-15 PROCEDURE — 86901 BLOOD TYPING SEROLOGIC RH(D): CPT

## 2020-08-15 PROCEDURE — 81025 URINE PREGNANCY TEST: CPT

## 2020-08-15 PROCEDURE — 81003 URINALYSIS AUTO W/O SCOPE: CPT

## 2020-08-15 PROCEDURE — 84702 CHORIONIC GONADOTROPIN TEST: CPT

## 2020-08-15 RX ADMIN — HUMAN RHO(D) IMMUNE GLOBULIN 300 MCG: 300 INJECTION, SOLUTION INTRAMUSCULAR at 09:08

## 2020-08-16 NOTE — ED NOTES
Lab called and states they need a type and screen order for rhogam. States okay to just send req.

## 2020-08-16 NOTE — ED NOTES
Pt given Rhogam ID card and educated on keeping it on her at all times. Pt verbalized understanding and states she has an OB appointment Tuesday. Rhogam booklet returned to blood bank and copy placed in chart.

## 2020-08-16 NOTE — ED NOTES
Pt reports recent positive urine pregnancy test with moderate vaginal bleeding and intermittent light abd cramping, onset today. Pt denies any other symptoms.    Patient identifiers verified and correct for Malgorzata Jackihardik Yoder.    LOC: The patient is awake, alert and aware of environment with an appropriate affect, the patient is oriented x 3 and speaking appropriately.  APPEARANCE: Patient resting comfortably and in no acute distress, patient is clean and well groomed, patient's clothing is properly fastened.  SKIN: The skin is warm and dry, color consistent with ethnicity, patient has normal skin turgor and moist mucus membranes, skin intact, no breakdown or bruising noted.  MUSCULOSKELETAL: Patient moving all extremities spontaneously.  RESPIRATORY: Airway is open and patent, respirations are spontaneous.  CARDIAC: Patient has a normal rate, no periphreal edema noted, capillary refill < 3 seconds.  ABDOMEN: Soft and non tender to palpation.

## 2020-08-17 ENCOUNTER — ROUTINE PRENATAL (OUTPATIENT)
Dept: OBSTETRICS AND GYNECOLOGY | Facility: CLINIC | Age: 35
End: 2020-08-17
Payer: COMMERCIAL

## 2020-08-17 ENCOUNTER — TELEPHONE (OUTPATIENT)
Dept: OBSTETRICS AND GYNECOLOGY | Facility: CLINIC | Age: 35
End: 2020-08-17

## 2020-08-17 ENCOUNTER — LAB VISIT (OUTPATIENT)
Dept: LAB | Facility: HOSPITAL | Age: 35
End: 2020-08-17
Attending: MIDWIFE
Payer: COMMERCIAL

## 2020-08-17 VITALS
BODY MASS INDEX: 40.91 KG/M2 | WEIGHT: 245.81 LBS | DIASTOLIC BLOOD PRESSURE: 94 MMHG | SYSTOLIC BLOOD PRESSURE: 146 MMHG

## 2020-08-17 DIAGNOSIS — O03.9 MISCARRIAGE: ICD-10-CM

## 2020-08-17 DIAGNOSIS — O03.9 MISCARRIAGE: Primary | ICD-10-CM

## 2020-08-17 PROCEDURE — 36415 COLL VENOUS BLD VENIPUNCTURE: CPT

## 2020-08-17 PROCEDURE — 99999 PR PBB SHADOW E&M-EST. PATIENT-LVL II: CPT | Mod: PBBFAC,,, | Performed by: MIDWIFE

## 2020-08-17 PROCEDURE — 99999 PR PBB SHADOW E&M-EST. PATIENT-LVL II: ICD-10-PCS | Mod: PBBFAC,,, | Performed by: MIDWIFE

## 2020-08-17 PROCEDURE — 0503F PR POSTPARTUM CARE VISIT: ICD-10-PCS | Mod: S$GLB,,, | Performed by: MIDWIFE

## 2020-08-17 PROCEDURE — 84702 CHORIONIC GONADOTROPIN TEST: CPT

## 2020-08-17 PROCEDURE — 0503F POSTPARTUM CARE VISIT: CPT | Mod: S$GLB,,, | Performed by: MIDWIFE

## 2020-08-17 RX ORDER — NORETHINDRONE ACETATE AND ETHINYL ESTRADIOL .02; 1 MG/1; MG/1
1 TABLET ORAL DAILY
Qty: 2 TABLET | Refills: 0 | Status: SHIPPED | OUTPATIENT
Start: 2020-08-17 | End: 2020-08-17

## 2020-08-17 NOTE — ED PROVIDER NOTES
HISTORY     Chief Complaint   Patient presents with    Vaginal Bleeding     patient reports found out she was pregnant this past week. was supposed to get ultrasound but never followed up. started having heavy vaginal bleedings and reports babys heart rate is 80     Review of patient's allergies indicates:  No Known Allergies     HPI   The history is provided by the patient.   Vaginal Bleeding  This is a new problem. The current episode started 12 to 24 hours ago. The problem has been gradually improving. Pertinent negatives include no chest pain and no shortness of breath. Nothing aggravates the symptoms. Nothing relieves the symptoms. She has tried nothing for the symptoms. The treatment provided no relief.        PCP: Sofia Abdul MD     Past Medical History:  Past Medical History:   Diagnosis Date    Asthma     childhood    Cervical cerclage suture present in second trimester 9/10/2019    12:00. Dominique cerclage    H/O incompetent cervix, currently pregnant, second trimester 9/10/2019    History of cerclage, currently pregnant 2019. Late prenatal care at 4 months, incidental finding of membranes in vaginal vault, cerclage placed per MFM - report requested    History of  delivery, currently pregnant 2019 failed  Cytotec induction of labor at 37 weeks secondary to  severe preeclampsia - [facility] -Dr. Cass Haywood   C/S -(uterine incision not documented) secondary to severe preeclampsia and recurrent late deceleration with SGA baby 4 lb 15 oz.  Dense fibrotic scar tissue was noted during surgery    History of ovarian cyst     Hypertension     Hypokalemia 2014    Paresthesia and pain of both upper extremities 2019        Past Surgical History:  Past Surgical History:   Procedure Laterality Date    CARPAL TUNNEL RELEASE Bilateral     CERVICAL CERCLAGE N/A 9/10/2019    Procedure: CERCLAGE, CERVIX;  Surgeon: Vandana Smith MD;   Location: [facility] OR;  Service: OB/GYN;  Laterality: N/A;     SECTION       SECTION N/A 2020    Procedure:  SECTION;  Surgeon: Vandana Smith MD;  Location: [facility] L&D;  Service: OB/GYN;  Laterality: N/A;    MOUTH SURGERY      OOPHORECTOMY Right 2010        Family History:  Family History   Problem Relation Age of Onset    Aneurysm Mother     Cancer Maternal Aunt         brain tumor    Breast cancer Maternal Grandmother     Colon cancer Neg Hx     Ovarian cancer Neg Hx     Uterine cancer Neg Hx     Cervical cancer Neg Hx         Social History:  Social History     Tobacco Use    Smoking status: Never Smoker    Smokeless tobacco: Never Used   Substance and Sexual Activity    Alcohol use: No    Drug use: No    Sexual activity: Yes     Partners: Male     Birth control/protection: None         ROS   Review of Systems   Constitutional: Negative for fever.   HENT: Negative for sore throat.    Respiratory: Negative for shortness of breath.    Cardiovascular: Negative for chest pain.   Gastrointestinal: Negative for nausea.   Genitourinary: Positive for vaginal bleeding. Negative for dysuria.   Musculoskeletal: Negative for back pain.   Skin: Negative for rash.   Neurological: Negative for weakness.   Hematological: Does not bruise/bleed easily.       PHYSICAL EXAM     Initial Vitals [08/15/20 1936]   BP Pulse Resp Temp SpO2   (!) 174/80 88 18 97.8 °F (36.6 °C) 99 %      MAP       --           Physical Exam    Constitutional: She appears well-developed and well-nourished. No distress.   HENT:   Head: Normocephalic and atraumatic.   Eyes: Conjunctivae are normal. Pupils are equal, round, and reactive to light.   Neck: Normal range of motion. Neck supple.   Cardiovascular: Normal rate, regular rhythm and normal heart sounds.   Pulmonary/Chest: Breath sounds normal.   Abdominal: Soft. Bowel sounds are normal. She exhibits no distension. There is no abdominal tenderness. There is no  "rebound.   Genitourinary:    Genitourinary Comments: Deferred      Musculoskeletal: Normal range of motion. No edema.   Neurological: She is alert and oriented to person, place, and time. She has normal strength.   Skin: Skin is warm and dry.   Psychiatric: She has a normal mood and affect.          ED COURSE   Procedures  ED ONGOING VITALS:  Vitals:    08/15/20 1936 08/15/20 2127   BP: (!) 174/80 (!) 159/80   Pulse: 88 84   Resp: 18 20   Temp: 97.8 °F (36.6 °C)    TempSrc: Oral    SpO2: 99% 100%   Weight: 112.4 kg (247 lb 12.8 oz)    Height: 5' 5" (1.651 m)          ABNORMAL LAB VALUES:  Labs Reviewed   PREGNANCY TEST, URINE RAPID - Abnormal; Notable for the following components:       Result Value    Preg Test, Ur Positive (*)     All other components within normal limits    Narrative:     Specimen Source->Urine   URINALYSIS, REFLEX TO URINE CULTURE - Abnormal; Notable for the following components:    Occult Blood UA Trace (*)     All other components within normal limits    Narrative:     Specimen Source->Urine   CBC W/ AUTO DIFFERENTIAL - Abnormal; Notable for the following components:    RBC 3.85 (*)     Hemoglobin 11.4 (*)     Hematocrit 35.6 (*)     All other components within normal limits   COMPREHENSIVE METABOLIC PANEL - Abnormal; Notable for the following components:    Potassium 3.2 (*)     All other components within normal limits   HCG, QUANTITATIVE, PREGNANCY   TYPE & SCREEN   PREPARE RH IMMUNE GLOBULIN         ALL LAB VALUES:  Results for orders placed or performed during the hospital encounter of 08/15/20   Pregnancy, urine rapid (UPT)   Result Value Ref Range    Preg Test, Ur Positive (A)    Urinalysis, Reflex to Urine Culture Urine, Clean Catch    Specimen: Urine   Result Value Ref Range    Specimen UA Urine, Clean Catch     Color, UA Yellow Yellow, Straw, [place]    Appearance, UA Clear Clear    pH, UA 7.0 5.0 - 8.0    Specific Gravity, UA 1.020 1.005 - 1.030    Protein, UA Negative Negative    " Glucose, UA Negative Negative    Ketones, UA Negative Negative    Bilirubin (UA) Negative Negative    Occult Blood UA Trace (A) Negative    Nitrite, UA Negative Negative    Urobilinogen, UA Negative <2.0 EU/dL    Leukocytes, UA Negative Negative   CBC W/ AUTO DIFFERENTIAL   Result Value Ref Range    WBC 5.68 3.90 - 12.70 K/uL    RBC 3.85 (L) 4.00 - 5.40 M/uL    Hemoglobin 11.4 (L) 12.0 - 16.0 g/dL    Hematocrit 35.6 (L) 37.0 - 48.5 %    Mean Corpuscular Volume 93 82 - 98 fL    Mean Corpuscular Hemoglobin 29.6 27.0 - 31.0 pg    Mean Corpuscular Hemoglobin Conc 32.0 32.0 - 36.0 g/dL    RDW 13.8 11.5 - 14.5 %    Platelets 221 150 - 350 K/uL    MPV 11.5 9.2 - 12.9 fL    Immature Granulocytes 0.4 0.0 - 0.5 %    Gran # (ANC) 3.1 1.8 - 7.7 K/uL    Immature Grans (Abs) 0.02 0.00 - 0.04 K/uL    Lymph # 2.0 1.0 - 4.8 K/uL    Mono # 0.4 0.3 - 1.0 K/uL    Eos # 0.2 0.0 - 0.5 K/uL    Baso # 0.02 0.00 - 0.20 K/uL    nRBC 0 0 /100 WBC    Gran% 54.0 38.0 - 73.0 %    Lymph% 35.2 18.0 - 48.0 %    Mono% 7.2 4.0 - 15.0 %    Eosinophil% 2.8 0.0 - 8.0 %    Basophil% 0.4 0.0 - 1.9 %    Differential Method Automated    hCG, quantitative   Result Value Ref Range    hCG Quant 2526 See Text mIU/mL   Comp. Metabolic Panel   Result Value Ref Range    Sodium 139 136 - 145 mmol/L    Potassium 3.2 (L) 3.5 - 5.1 mmol/L    Chloride 102 95 - 110 mmol/L    CO2 27 23 - 29 mmol/L    Glucose 83 70 - 110 mg/dL    BUN, Bld 9 6 - 20 mg/dL    Creatinine 0.9 0.5 - 1.4 mg/dL    Calcium 9.4 8.7 - 10.5 mg/dL    Total Protein 7.7 6.0 - 8.4 g/dL    Albumin 3.7 3.5 - 5.2 g/dL    Total Bilirubin 0.3 0.1 - 1.0 mg/dL    Alkaline Phosphatase 70 55 - 135 U/L    AST 17 10 - 40 U/L    ALT 17 10 - 44 U/L    Anion Gap 10 8 - 16 mmol/L    eGFR if African American >60 >60 mL/min/1.73 m^2    eGFR if non African American >60 >60 mL/min/1.73 m^2   Type & Screen   Result Value Ref Range    Group & Rh A NEG     Indirect Bashir NEG    Prepare Rh Immune Globulin   Result Value Ref  Range    RH Immune [facility] [id number] Issued            RADIOLOGY STUDIES:  Imaging Results    None         See results from ultrasound 2 days ago.           The above vital signs and test results have been reviewed by the emergency provider.     ED Medications:  Discharge Medication List as of 8/15/2020  9:11 PM        Discharge Medications:  Discharge Medication List as of 8/15/2020  9:11 PM         Follow-up Information     [facility]. Schedule an appointment as soon as possible for a visit in 2 days.    Specialty: Obstetrics and Gynecology  Contact information:  [address] [place] [id number] [phone]  Additional information:  (off [facility]) 3rd floor           [facility] - .    Specialty: Emergency Medicine  Why: As needed, If symptoms worsen  Contact information:  [address] [place] [id number] [phone]                I discussed with patient and/or family/caretaker that evaluation in the ED does not suggest any emergent or life threatening medical conditions requiring immediate intervention beyond what was provided in the ED, and I believe patient is safe for discharge. Regardless, an unremarkable evaluation in the ED does not preclude the development or presence of a serious or life threatening condition. As such, patient was instructed to return immediately for any worsening or change in current symptoms.    I discussed with patient and/or family/caretaker that her symptoms place her at risk for a threatened spontaneous .  Any worse vaginal bleeding or abdominal pain should be evaluated immediately by her OB GYN or in the ED.  If evaluation does not show an IUP, I have counseled patient that current tests do not demonstrate an IUP but that she is stable for discharge at this time with the understanding that she needs follow up within 48 - 72 hours for repeat testing.        MEDICAL DECISION MAKING                  CLINICAL IMPRESSION       ICD-10-CM ICD-9-CM   1. Threatened miscarriage in early pregnancy  O20.0 640.03   2. Vaginal bleeding in pregnancy  O46.90 641.93       Disposition:   Disposition: Discharged  Condition: Stable         Kevinsnehal Kirkland NP  08/17/20 0116

## 2020-08-17 NOTE — PROGRESS NOTES
Pt here today for f/u from miscarriage at approx 6w GA. She was seen in the ED on 8/15/20 with c/o heavy bleeding and clots. Pt reports she passed clots and tissue last night. She states bleeding is very light today. Discussed follow up Hcg levels to less than 5. Will collect one today and schedule another Hcg draw in 2 weeks. Pt desiring sterilization. BTL consult scheduled. OCP's x 2 months sent to pharmacy per pt request. BP slightly elevated today. Pt reports that she is taking Labetalol 200mg BID. Advised f/u with PCP for BP management.

## 2020-08-17 NOTE — TELEPHONE ENCOUNTER
Returned call to patient.  She requested an appt for today for f/u from ED.  Says that she miscarried and need to come in today.  Appt scheduled for today at 1:15 pm.  She confirmed appt, provider and location.  She verbalized understanding of the current visitor and mask policies.

## 2020-08-17 NOTE — TELEPHONE ENCOUNTER
----- Message from Renee Martinez sent at 8/17/2020  8:23 AM CDT -----  Contact: Malgorzata Mcgarry would like a call back at [phone], Regards to coming in sooner than 8/20/2020.    Thanks  Td

## 2020-08-18 LAB — HCG INTACT+B SERPL-ACNC: 763 MIU/ML

## 2020-09-29 ENCOUNTER — OFFICE VISIT (OUTPATIENT)
Dept: INTERNAL MEDICINE | Facility: CLINIC | Age: 35
End: 2020-09-29
Payer: COMMERCIAL

## 2020-09-29 ENCOUNTER — LAB VISIT (OUTPATIENT)
Dept: LAB | Facility: HOSPITAL | Age: 35
End: 2020-09-29
Attending: FAMILY MEDICINE
Payer: COMMERCIAL

## 2020-09-29 ENCOUNTER — PATIENT MESSAGE (OUTPATIENT)
Dept: ADMINISTRATIVE | Facility: OTHER | Age: 35
End: 2020-09-29

## 2020-09-29 ENCOUNTER — LAB VISIT (OUTPATIENT)
Dept: LAB | Facility: HOSPITAL | Age: 35
End: 2020-09-29
Payer: COMMERCIAL

## 2020-09-29 VITALS
HEART RATE: 87 BPM | TEMPERATURE: 98 F | SYSTOLIC BLOOD PRESSURE: 160 MMHG | WEIGHT: 239.19 LBS | OXYGEN SATURATION: 99 % | BODY MASS INDEX: 39.8 KG/M2 | DIASTOLIC BLOOD PRESSURE: 94 MMHG

## 2020-09-29 DIAGNOSIS — Z29.9 PREVENTIVE MEASURE: ICD-10-CM

## 2020-09-29 DIAGNOSIS — I10 ESSENTIAL HYPERTENSION: Primary | ICD-10-CM

## 2020-09-29 DIAGNOSIS — I10 ESSENTIAL HYPERTENSION: ICD-10-CM

## 2020-09-29 DIAGNOSIS — E66.01 SEVERE OBESITY WITH BODY MASS INDEX (BMI) OF 35.0 TO 39.9 WITH SERIOUS COMORBIDITY: ICD-10-CM

## 2020-09-29 LAB
ALBUMIN SERPL BCP-MCNC: 3.5 G/DL (ref 3.5–5.2)
ALP SERPL-CCNC: 57 U/L (ref 55–135)
ALT SERPL W/O P-5'-P-CCNC: 19 U/L (ref 10–44)
ANION GAP SERPL CALC-SCNC: 12 MMOL/L (ref 8–16)
AST SERPL-CCNC: 18 U/L (ref 10–40)
BILIRUB SERPL-MCNC: 0.4 MG/DL (ref 0.1–1)
BILIRUB UR QL STRIP: NEGATIVE
BUN SERPL-MCNC: 8 MG/DL (ref 6–20)
CALCIUM SERPL-MCNC: 8.8 MG/DL (ref 8.7–10.5)
CHLORIDE SERPL-SCNC: 103 MMOL/L (ref 95–110)
CHOLEST SERPL-MCNC: 140 MG/DL (ref 120–199)
CHOLEST/HDLC SERPL: 2.9 {RATIO} (ref 2–5)
CLARITY UR: CLEAR
CO2 SERPL-SCNC: 26 MMOL/L (ref 23–29)
COLOR UR: YELLOW
CREAT SERPL-MCNC: 0.9 MG/DL (ref 0.5–1.4)
EST. GFR  (AFRICAN AMERICAN): >60 ML/MIN/1.73 M^2
EST. GFR  (NON AFRICAN AMERICAN): >60 ML/MIN/1.73 M^2
GLUCOSE SERPL-MCNC: 91 MG/DL (ref 70–110)
GLUCOSE UR QL STRIP: NEGATIVE
HDLC SERPL-MCNC: 49 MG/DL (ref 40–75)
HDLC SERPL: 35 % (ref 20–50)
HGB UR QL STRIP: ABNORMAL
KETONES UR QL STRIP: NEGATIVE
LDLC SERPL CALC-MCNC: 78.6 MG/DL (ref 63–159)
LEUKOCYTE ESTERASE UR QL STRIP: NEGATIVE
NITRITE UR QL STRIP: NEGATIVE
NONHDLC SERPL-MCNC: 91 MG/DL
PH UR STRIP: 7 [PH] (ref 5–8)
POTASSIUM SERPL-SCNC: 3.3 MMOL/L (ref 3.5–5.1)
PROT SERPL-MCNC: 7.7 G/DL (ref 6–8.4)
PROT UR QL STRIP: ABNORMAL
SODIUM SERPL-SCNC: 141 MMOL/L (ref 136–145)
SP GR UR STRIP: 1.02 (ref 1–1.03)
TRIGL SERPL-MCNC: 62 MG/DL (ref 30–150)
URN SPEC COLLECT METH UR: ABNORMAL

## 2020-09-29 PROCEDURE — 83036 HEMOGLOBIN GLYCOSYLATED A1C: CPT

## 2020-09-29 PROCEDURE — 81003 URINALYSIS AUTO W/O SCOPE: CPT

## 2020-09-29 PROCEDURE — 80053 COMPREHEN METABOLIC PANEL: CPT

## 2020-09-29 PROCEDURE — 80061 LIPID PANEL: CPT

## 2020-09-29 PROCEDURE — 84443 ASSAY THYROID STIM HORMONE: CPT

## 2020-09-29 PROCEDURE — 3008F PR BODY MASS INDEX (BMI) DOCUMENTED: ICD-10-PCS | Mod: CPTII,S$GLB,, | Performed by: FAMILY MEDICINE

## 2020-09-29 PROCEDURE — 3008F BODY MASS INDEX DOCD: CPT | Mod: CPTII,S$GLB,, | Performed by: FAMILY MEDICINE

## 2020-09-29 PROCEDURE — 36415 COLL VENOUS BLD VENIPUNCTURE: CPT

## 2020-09-29 PROCEDURE — 99999 PR PBB SHADOW E&M-EST. PATIENT-LVL III: CPT | Mod: PBBFAC,,, | Performed by: FAMILY MEDICINE

## 2020-09-29 PROCEDURE — 85025 COMPLETE CBC W/AUTO DIFF WBC: CPT

## 2020-09-29 PROCEDURE — 99214 OFFICE O/P EST MOD 30 MIN: CPT | Mod: S$GLB,,, | Performed by: FAMILY MEDICINE

## 2020-09-29 PROCEDURE — 99214 PR OFFICE/OUTPT VISIT, EST, LEVL IV, 30-39 MIN: ICD-10-PCS | Mod: S$GLB,,, | Performed by: FAMILY MEDICINE

## 2020-09-29 PROCEDURE — 99999 PR PBB SHADOW E&M-EST. PATIENT-LVL III: ICD-10-PCS | Mod: PBBFAC,,, | Performed by: FAMILY MEDICINE

## 2020-09-29 RX ORDER — AMLODIPINE BESYLATE 5 MG/1
5 TABLET ORAL DAILY
Qty: 30 TABLET | Refills: 1 | Status: SHIPPED | OUTPATIENT
Start: 2020-09-29 | End: 2020-10-13 | Stop reason: SDUPTHER

## 2020-09-29 NOTE — PROGRESS NOTES
Subjective:       Patient ID: Malgorzata Yoder is a 35 y.o. female.    Chief Complaint: Discuss medications    35-year-old  female patient with Patient Active Problem List:     Hypertension     Carpal tunnel syndrome on both sides     Severe obesity with body mass index (BMI) of 35.0 to 39.9 with serious comorbidity     Asthma     Status post repeat low transverse  section     Rh negative state in antepartum period, first trimester     History of pre-eclampsia in prior pregnancy, currently pregnant    Here reports that she has been taking labetalol 200 mg twice daily but continues to have mild headaches, and blood pressure has been elevated.  Patient currently has a 7-month-old child, and recently had a miscarriage last month.  Denies any chest pain or difficulty breathing or palpitations, .  Nausea vomiting , tingling or numbness sensation to extremities    Hypertension  This is a chronic problem. The current episode started more than 1 year ago. The problem has been waxing and waning since onset. The problem is resistant. Associated symptoms include anxiety, blurred vision, headaches and peripheral edema. Pertinent negatives include no chest pain, malaise/fatigue, neck pain, orthopnea, palpitations, PND, shortness of breath or sweats. Agents associated with hypertension include oral contraceptives. Risk factors for coronary artery disease include obesity and stress. Past treatments include nothing. The current treatment provides mild improvement. There are no compliance problems.      Review of Systems   Constitutional: Negative for malaise/fatigue.   Eyes: Positive for blurred vision. Negative for visual disturbance.   Respiratory: Negative for shortness of breath.    Cardiovascular: Negative for chest pain, palpitations, orthopnea and PND.   Musculoskeletal: Negative for neck pain.   Neurological: Positive for headaches.         BP (!) 160/94 (BP Location: Right arm, Patient  Position: Sitting, BP Method: Large (Manual))   Pulse 87   Temp 98 °F (36.7 °C) (Tympanic)   Wt 108.5 kg (239 lb 3.2 oz)   SpO2 99%   Breastfeeding No   BMI 39.80 kg/m²   Objective:      Physical Exam  Constitutional:       Appearance: She is well-developed.   HENT:      Head: Normocephalic and atraumatic.   Cardiovascular:      Rate and Rhythm: Normal rate and regular rhythm.      Heart sounds: Normal heart sounds. No murmur.   Pulmonary:      Effort: Pulmonary effort is normal.      Breath sounds: Normal breath sounds. No wheezing.   Abdominal:      General: Bowel sounds are normal.      Palpations: Abdomen is soft.      Tenderness: There is no abdominal tenderness.   Skin:     General: Skin is warm and dry.      Findings: No rash.   Neurological:      General: No focal deficit present.      Mental Status: She is alert and oriented to person, place, and time.   Psychiatric:         Mood and Affect: Mood normal.           Assessment/Plan:   1. Essential hypertension  - amLODIPine (NORVASC) 5 MG tablet; Take 1 tablet (5 mg total) by mouth once daily.  Dispense: 30 tablet; Refill: 1  - Hypertension Digital Medicine ([facility]) Enrollment Order  - Hypertension Digital Medicine ([facility]): Assign Onboarding Questionnaires  - Comprehensive metabolic panel; Future  - Lipid Panel; Future  - TSH; Future  - Urinalysis; Future  Noted elevated blood pressure, will start on amlodipine 5 mg in addition to labetalol 200 mg twice daily  Follow-up in 2 weeks  Will enroll in hypertension digital program  Restrict salt intake and eat low-fat and low-cholesterol diet    2. Severe obesity with body mass index (BMI) of 35.0 to 39.9 with serious comorbidity  Strict lifestyle changes recommended with diet and exercise to lose weight with BMI 39    3. Preventive measure  - CBC auto differential; Future  - Comprehensive metabolic panel; Future  - Lipid Panel; Future  - TSH; Future  - Hemoglobin A1C; Future  - Urinalysis; Future     Follow-up  for physicals in 2 weeks  Fasting labs done today

## 2020-09-30 ENCOUNTER — PATIENT OUTREACH (OUTPATIENT)
Dept: OTHER | Facility: OTHER | Age: 35
End: 2020-09-30

## 2020-09-30 DIAGNOSIS — E87.6 HYPOKALEMIA: Primary | ICD-10-CM

## 2020-09-30 LAB
BASOPHILS # BLD AUTO: 0.03 K/UL (ref 0–0.2)
BASOPHILS NFR BLD: 0.6 % (ref 0–1.9)
DIFFERENTIAL METHOD: ABNORMAL
EOSINOPHIL # BLD AUTO: 0.1 K/UL (ref 0–0.5)
EOSINOPHIL NFR BLD: 2.4 % (ref 0–8)
ERYTHROCYTE [DISTWIDTH] IN BLOOD BY AUTOMATED COUNT: 13.3 % (ref 11.5–14.5)
ESTIMATED AVG GLUCOSE: 88 MG/DL (ref 68–131)
HBA1C MFR BLD HPLC: 4.7 % (ref 4–5.6)
HCT VFR BLD AUTO: 37.4 % (ref 37–48.5)
HGB BLD-MCNC: 11.6 G/DL (ref 12–16)
IMM GRANULOCYTES # BLD AUTO: 0.01 K/UL (ref 0–0.04)
IMM GRANULOCYTES NFR BLD AUTO: 0.2 % (ref 0–0.5)
LYMPHOCYTES # BLD AUTO: 1.8 K/UL (ref 1–4.8)
LYMPHOCYTES NFR BLD: 35.1 % (ref 18–48)
MCH RBC QN AUTO: 29 PG (ref 27–31)
MCHC RBC AUTO-ENTMCNC: 31 G/DL (ref 32–36)
MCV RBC AUTO: 94 FL (ref 82–98)
MONOCYTES # BLD AUTO: 0.4 K/UL (ref 0.3–1)
MONOCYTES NFR BLD: 6.9 % (ref 4–15)
NEUTROPHILS # BLD AUTO: 2.8 K/UL (ref 1.8–7.7)
NEUTROPHILS NFR BLD: 54.8 % (ref 38–73)
NRBC BLD-RTO: 0 /100 WBC
PLATELET # BLD AUTO: 216 K/UL (ref 150–350)
PMV BLD AUTO: 12.7 FL (ref 9.2–12.9)
RBC # BLD AUTO: 4 M/UL (ref 4–5.4)
TSH SERPL DL<=0.005 MIU/L-ACNC: 0.84 UIU/ML (ref 0.4–4)
WBC # BLD AUTO: 5.04 K/UL (ref 3.9–12.7)

## 2020-09-30 RX ORDER — POTASSIUM CHLORIDE 750 MG/1
10 CAPSULE, EXTENDED RELEASE ORAL DAILY
Qty: 30 CAPSULE | Refills: 3 | Status: SHIPPED | OUTPATIENT
Start: 2020-09-30 | End: 2021-04-14 | Stop reason: DRUGHIGH

## 2020-09-30 NOTE — LETTER
September 30, 2020     Malgorzata Yoder  [address]  [place] [address]       Dear Malgorzata,    Welcome to [facility] Our goal is to make care effective, proactive and convenient by using data you send us from home to better treat your chronic conditions.                My name is Tammi Villalpando, and I am your dedicated Digital Medicine clinician. As an expert in medication management, I will help ensure that the medications you are taking continue to provide the intended benefits and help you reach your goals. You can reach me directly at [phone] or by sending me a message directly through your [facility] account.      I am Celi Briones and I will be your health . My job is to help you identify lifestyle changes to improve your disease control. We will talk about nutrition, exercise, and other ways you may be able to adjust your current habits to better your health. Additionally, we will help ensure you are completing the tests and screenings that are necessary to help manage your conditions. You can reach me directly at [phone] or by sending me a message directly through your [facility] account.    Most importantly, YOU are at the center of this team. Together, we will work to improve your overall health and encourage you to meet your goals for a healthier lifestyle.     What we expect from YOU:  · Please take frequent home blood pressure measurements. We ask that you take at least 1 blood pressure reading per week, but more information will better help us get you know you. Be sure you rest for a few minutes before taking the reading in a quiet, comfortable place.     Be available to receive phone calls or [employer] messages, when appropriate, from your care team. Please let us know if there are any specific days or times that work best for us to reach you via phone.     Complete routine tests and screenings. Dont worry, we will help keep  you on track!           What you should expect from your Digital Medicine Care Team:   We will work with you to create a personalized plan of care and provide you with encouragement and education, including regarding lifestyle changes, that could help you manage your disease states.     We will adjust your current medications, if needed, and continue to monitor your long-term progress.     We will provide you and your physician with monthly progress reports after you have been in the program for more than 30 days.     We will send you reminders through [employer] and text messages to help ensure you do not miss any testing deadlines to help manage your disease states.    You will be able to reach us by phone or through your [employer] account by clicking our names under Care Team on the right side of the home screen.    We look forward to working with you to help manage your health,    Sincerely,    Your Digital Medicine Team    Please visit our websites to learn more:   · Hypertension: [email or web address]      Remember, we are not available for emergencies. If you have an emergency, please contact your doctors office directly or call [facility] on-call ([phone] or [phone]) or [phone].

## 2020-09-30 NOTE — PROGRESS NOTES
Digital Medicine: Health  Introduction    Introduced Malgorzata Yoder to Digital Medicine. Discussed health  role and recommended lifestyle modifications.    The history is provided by the patient.           Additional Enrollment Details: Brief enrollment call. Patient was at work.    HYPERTENSION  Explained hypertension digital medicine goals including BP goal less than or equal to 130/80mmHg, improved convenience of BP management and reduced risk of heart attack, kidney failure, stroke, eye disease, dementia, and death.      Explained non-pharmacologic therapies like low salt diet and physical activity can reduce blood pressure. .      Explained that we expect patient to submit several blood pressure readings per week at random times of the day, but at least 30 minutes after taking blood pressure medications. Instructed patient not to allow anyone else to use their blood pressure monitor and phone as data submitted is directly entered into medical record. Reviewed and confirmed appropriate blood pressure monitoring technique.         Explained to the patient that the Digital Medicine team is not available for emergencies. Advised patient call [facility] On [facility] ([phone] or [phone]) or [phone] if needed.               Diet-Not assessed          Physical Activity-Not assessed    Medication Adherence-Medication Adherence not addressed.      Substance, Sleep, Stress-Not assessed      Provided patient education.  Reviewed Device Techniques.     Addressed patient questions and patient has my contact information if needed prior to next outreach. Patient verbalizes understanding.      Explained the importance of self-monitoring and medication adherence. Encouraged the patient to communicate with their health  for lifestyle modifications to help improve or maintain a healthy lifestyle.        Sent link to [facility] webpages and my contact information via [employer] for future  questions.        Explained to the patient that the Digital Medicine team is not available for emergencies. Advised patient call [facility] ([phone] or [phone]) or [phone] if needed.         There are no preventive care reminders to display for this patient.    Last 5 Patient Entered Readings                                      Current 30 Day Average: 135/106     Recent Readings 9/29/2020    SBP (mmHg) 135    DBP (mmHg) 106    Pulse 74

## 2020-10-13 ENCOUNTER — OFFICE VISIT (OUTPATIENT)
Dept: INTERNAL MEDICINE | Facility: CLINIC | Age: 35
End: 2020-10-13
Payer: COMMERCIAL

## 2020-10-13 DIAGNOSIS — G56.03 CARPAL TUNNEL SYNDROME ON BOTH SIDES: ICD-10-CM

## 2020-10-13 DIAGNOSIS — E87.6 HYPOKALEMIA: ICD-10-CM

## 2020-10-13 DIAGNOSIS — Z00.00 ROUTINE GENERAL MEDICAL EXAMINATION AT A HEALTH CARE FACILITY: Primary | ICD-10-CM

## 2020-10-13 DIAGNOSIS — E66.01 SEVERE OBESITY WITH BODY MASS INDEX (BMI) OF 35.0 TO 39.9 WITH SERIOUS COMORBIDITY: ICD-10-CM

## 2020-10-13 DIAGNOSIS — J45.20 MILD INTERMITTENT ASTHMA WITHOUT COMPLICATION: ICD-10-CM

## 2020-10-13 DIAGNOSIS — I10 ESSENTIAL HYPERTENSION: ICD-10-CM

## 2020-10-13 PROBLEM — Z98.891 STATUS POST REPEAT LOW TRANSVERSE CESAREAN SECTION: Status: RESOLVED | Noted: 2020-02-20 | Resolved: 2020-10-13

## 2020-10-13 PROBLEM — Z67.91 RH NEGATIVE STATE IN ANTEPARTUM PERIOD, FIRST TRIMESTER: Status: RESOLVED | Noted: 2020-08-01 | Resolved: 2020-10-13

## 2020-10-13 PROBLEM — O26.891 RH NEGATIVE STATE IN ANTEPARTUM PERIOD, FIRST TRIMESTER: Status: RESOLVED | Noted: 2020-08-01 | Resolved: 2020-10-13

## 2020-10-13 PROBLEM — O09.299 HISTORY OF PRE-ECLAMPSIA IN PRIOR PREGNANCY, CURRENTLY PREGNANT: Status: RESOLVED | Noted: 2020-08-05 | Resolved: 2020-10-13

## 2020-10-13 PROCEDURE — 90471 FLU VACCINE (QUAD) GREATER THAN OR EQUAL TO 3YO PRESERVATIVE FREE IM: ICD-10-PCS | Mod: S$GLB,,, | Performed by: FAMILY MEDICINE

## 2020-10-13 PROCEDURE — 99395 PREV VISIT EST AGE 18-39: CPT | Mod: 25,S$GLB,, | Performed by: FAMILY MEDICINE

## 2020-10-13 PROCEDURE — 90686 IIV4 VACC NO PRSV 0.5 ML IM: CPT | Mod: S$GLB,,, | Performed by: FAMILY MEDICINE

## 2020-10-13 PROCEDURE — 90471 IMMUNIZATION ADMIN: CPT | Mod: S$GLB,,, | Performed by: FAMILY MEDICINE

## 2020-10-13 PROCEDURE — 99999 PR PBB SHADOW E&M-EST. PATIENT-LVL IV: ICD-10-PCS | Mod: PBBFAC,,, | Performed by: FAMILY MEDICINE

## 2020-10-13 PROCEDURE — 99999 PR PBB SHADOW E&M-EST. PATIENT-LVL IV: CPT | Mod: PBBFAC,,, | Performed by: FAMILY MEDICINE

## 2020-10-13 PROCEDURE — 99395 PR PREVENTIVE VISIT,EST,18-39: ICD-10-PCS | Mod: 25,S$GLB,, | Performed by: FAMILY MEDICINE

## 2020-10-13 PROCEDURE — 90686 FLU VACCINE (QUAD) GREATER THAN OR EQUAL TO 3YO PRESERVATIVE FREE IM: ICD-10-PCS | Mod: S$GLB,,, | Performed by: FAMILY MEDICINE

## 2020-10-13 RX ORDER — AMLODIPINE BESYLATE 5 MG/1
5 TABLET ORAL DAILY
Qty: 90 TABLET | Refills: 1 | Status: SHIPPED | OUTPATIENT
Start: 2020-10-13 | End: 2021-04-13 | Stop reason: SDUPTHER

## 2020-10-13 NOTE — PROGRESS NOTES
"Subjective:       Patient ID: Malgorzata Yoder is a 35 y.o. female.    Chief Complaint: Follow-up and Knee Pain (Bilateral)    35-year-old   female patient with Patient Active Problem List:     Essential hypertension     Carpal tunnel syndrome on both sides     Severe obesity with body mass index (BMI) of 35.0 to 39.9 with serious comorbidity     Asthma  Here for routine annual physicals.  Patient is here for follow-up on blood pressure, started taking amlodipine and labetalol.   Denies any chest pain or difficulty breathing or palpitations or dizziness.  Denies any problems with asthma and has not used albuterol inhaler for past several years  Patient has been having bilateral hand pains especially working as a , reports that she had carpal tunnel release done 5 years ago and started to have numbness sensation to both the hands with pain off and on lately especially after work  Has not been exercising lately but staying physically active  Reports minimal discomfort to both the knees, denies any recent falls or injuries    Review of Systems   Constitutional: Negative for fatigue.   Eyes: Negative for visual disturbance.   Respiratory: Negative for shortness of breath.    Cardiovascular: Negative for chest pain and leg swelling.   Gastrointestinal: Negative for abdominal pain, nausea and vomiting.   Musculoskeletal: Positive for arthralgias and myalgias. Negative for gait problem and joint swelling.   Skin: Negative for rash.   Neurological: Positive for numbness. Negative for weakness, light-headedness and headaches.   Psychiatric/Behavioral: Negative for sleep disturbance.         BP (P) 132/78 (BP Location: Right arm, Patient Position: Sitting, BP Method: Large (Manual))   Pulse (P) 80   Temp (P) 97.5 °F (36.4 °C) (Temporal)   Resp (P) 18   Ht (P) 5' 5" (1.651 m)   Wt (P) 108.9 kg (240 lb 1.3 oz)   SpO2 (P) 97%   BMI (P) 39.95 kg/m²   Objective:      Physical " Exam  Constitutional:       Appearance: She is well-developed.   HENT:      Head: Normocephalic and atraumatic.   Cardiovascular:      Rate and Rhythm: Normal rate and regular rhythm.      Heart sounds: Normal heart sounds. No murmur.   Pulmonary:      Effort: Pulmonary effort is normal.      Breath sounds: Normal breath sounds. No wheezing.   Abdominal:      General: Bowel sounds are normal.      Palpations: Abdomen is soft.      Tenderness: There is no abdominal tenderness.   Musculoskeletal:         General: Tenderness present.      Comments: Positive for Tinel's sign bilaterally  Negative for Phalen sign    Minimal tenderness noted to bilateral knees on palpation anteriorly but no crepitus appreciated   Skin:     General: Skin is warm and dry.      Findings: No rash.   Neurological:      Mental Status: She is alert and oriented to person, place, and time.   Psychiatric:         Mood and Affect: Mood normal.         No visits with results within 2 Week(s) from this visit.   Latest known visit with results is:   Lab Visit on 09/29/2020   Component Date Value Ref Range Status    WBC 09/29/2020 5.04  3.90 - 12.70 K/uL Final    RBC 09/29/2020 4.00  4.00 - 5.40 M/uL Final    Hemoglobin 09/29/2020 11.6* 12.0 - 16.0 g/dL Final    Hematocrit 09/29/2020 37.4  37.0 - 48.5 % Final    Mean Corpuscular Volume 09/29/2020 94  82 - 98 fL Final    Mean Corpuscular Hemoglobin 09/29/2020 29.0  27.0 - 31.0 pg Final    Mean Corpuscular Hemoglobin Conc 09/29/2020 31.0* 32.0 - 36.0 g/dL Final    RDW 09/29/2020 13.3  11.5 - 14.5 % Final    Platelets 09/29/2020 216  150 - 350 K/uL Final    MPV 09/29/2020 12.7  9.2 - 12.9 fL Final    Immature Granulocytes 09/29/2020 0.2  0.0 - 0.5 % Final    Gran # (ANC) 09/29/2020 2.8  1.8 - 7.7 K/uL Final    Immature Grans (Abs) 09/29/2020 0.01  0.00 - 0.04 K/uL Final    Comment: Mild elevation in immature granulocytes is non specific and   can be seen in a variety of conditions including  stress response,   acute inflammation, trauma and pregnancy. Correlation with other   laboratory and clinical findings is essential.      Lymph # 09/29/2020 1.8  1.0 - 4.8 K/uL Final    Mono # 09/29/2020 0.4  0.3 - 1.0 K/uL Final    Eos # 09/29/2020 0.1  0.0 - 0.5 K/uL Final    Baso # 09/29/2020 0.03  0.00 - 0.20 K/uL Final    nRBC 09/29/2020 0  0 /100 WBC Final    Gran% 09/29/2020 54.8  38.0 - 73.0 % Final    Lymph% 09/29/2020 35.1  18.0 - 48.0 % Final    Mono% 09/29/2020 6.9  4.0 - 15.0 % Final    Eosinophil% 09/29/2020 2.4  0.0 - 8.0 % Final    Basophil% 09/29/2020 0.6  0.0 - 1.9 % Final    Differential Method 09/29/2020 Automated   Final    Sodium 09/29/2020 141  136 - 145 mmol/L Final    Potassium 09/29/2020 3.3* 3.5 - 5.1 mmol/L Final    Chloride 09/29/2020 103  95 - 110 mmol/L Final    CO2 09/29/2020 26  23 - 29 mmol/L Final    Glucose 09/29/2020 91  70 - 110 mg/dL Final    BUN, Bld 09/29/2020 8  6 - 20 mg/dL Final    Creatinine 09/29/2020 0.9  0.5 - 1.4 mg/dL Final    Calcium 09/29/2020 8.8  8.7 - 10.5 mg/dL Final    Total Protein 09/29/2020 7.7  6.0 - 8.4 g/dL Final    Albumin 09/29/2020 3.5  3.5 - 5.2 g/dL Final    Total Bilirubin 09/29/2020 0.4  0.1 - 1.0 mg/dL Final    Comment: For infants and newborns, interpretation of results should be based  on gestational age, weight and in agreement with clinical  observations.  Premature Infant recommended reference ranges:  Up to 24 hours.............<8.0 mg/dL  Up to 48 hours............<12.0 mg/dL  3-5 days..................<15.0 mg/dL  6-29 days.................<15.0 mg/dL      Alkaline Phosphatase 09/29/2020 57  55 - 135 U/L Final    AST 09/29/2020 18  10 - 40 U/L Final    ALT 09/29/2020 19  10 - 44 U/L Final    Anion Gap 09/29/2020 12  8 - 16 mmol/L Final    eGFR if African American 09/29/2020 >60.0  >60 mL/min/1.73 m^2 Final    eGFR if non African American 09/29/2020 >60.0  >60 mL/min/1.73 m^2 Final    Comment: Calculation used  to obtain the estimated glomerular filtration  rate (eGFR) is the CKD-EPI equation.       Cholesterol 09/29/2020 140  120 - 199 mg/dL Final    Comment: The National Cholesterol Education Program (NCEP) has set the  following guidelines (reference ranges) for Cholesterol:  Optimal.....................<200 mg/dL  Borderline High.............200-239 mg/dL  High........................> or = 240 mg/dL      Triglycerides 09/29/2020 62  30 - 150 mg/dL Final    Comment: The National Cholesterol Education Program (NCEP) has set the  following guidelines (reference values) for triglycerides:  Normal......................<150 mg/dL  Borderline High.............150-199 mg/dL  High........................200-499 mg/dL      HDL 09/29/2020 49  40 - 75 mg/dL Final    Comment: The National Cholesterol Education Program (NCEP) has set the  following guidelines (reference values) for HDL Cholesterol:  Low...............<40 mg/dL  Optimal...........>60 mg/dL      LDL Cholesterol 09/29/2020 78.6  63.0 - 159.0 mg/dL Final    Comment: The National Cholesterol Education Program (NCEP) has set the  following guidelines (reference values) for LDL Cholesterol:  Optimal.......................<130 mg/dL  Borderline High...............130-159 mg/dL  High..........................160-189 mg/dL  Very High.....................>190 mg/dL      Hdl/Cholesterol Ratio 09/29/2020 35.0  20.0 - 50.0 % Final    Total Cholesterol/HDL Ratio 09/29/2020 2.9  2.0 - 5.0 Final    Non-HDL Cholesterol 09/29/2020 91  mg/dL Final    Comment: Risk category and Non-HDL cholesterol goals:  Coronary heart disease (CHD)or equivalent (10-year risk of CHD >20%):  Non-HDL cholesterol goal     <130 mg/dL  Two or more CHD risk factors and 10-year risk of CHD <= 20%:  Non-HDL cholesterol goal     <160 mg/dL  0 to 1 CHD risk factor:  Non-HDL cholesterol goal     <190 mg/dL      TSH 09/29/2020 0.843  0.400 - 4.000 uIU/mL Final    Hemoglobin A1C 09/29/2020 4.7  4.0 - 5.6  % Final    Comment: ADA Screening Guidelines:  5.7-6.4%  Consistent with prediabetes  >or=6.5%  Consistent with diabetes  High levels of fetal hemoglobin interfere with the HbA1C  assay. Heterozygous hemoglobin variants (HbS, HgC, etc)do  not significantly interfere with this assay.   However, presence of multiple variants may affect accuracy.      Estimated Avg Glucose 09/29/2020 88  68 - 131 mg/dL Final       Assessment/Plan:   1. Routine general medical examination at a health care facility  Vital signs stable today.  Clinical exam stable  Continue lifestyle modifications with low-fat and low-cholesterol diet and exercise 30 minutes daily  Reviewed recent labs which looks stable  Flu shot given today    2. Essential hypertension  - amLODIPine (NORVASC) 5 MG tablet; Take 1 tablet (5 mg total) by mouth once daily.  Dispense: 90 tablet; Refill: 1  Blood pressure is stable currently on amlodipine 5 mg and labetalol 200 mg twice daily  Restrict salt intake and eat low-fat and low-cholesterol diet    3. Mild intermittent asthma without complication  Stable and asymptomatic    4. Hypokalemia  Encouraged to take potassium supplements 10 mEq daily and continue eating potassium rich diet    5. Severe obesity with body mass index (BMI) of 35.0 to 39.9 with serious comorbidity  Strict lifestyle changes recommended with diet and exercise to lose weight with BMI 39    6. Carpal tunnel syndrome on both sides  - Nerve conduction test; Future   Patient status post carpal tunnel release bilaterally 5 years ago  Will plan to repeat nerve conduction studies  Encouraged to use carpal tunnel sleeves    Graded exercise regimen recommended bilateral knee pains  Encouraged to take over-the-counter multivitamins daily    Flu shot given today

## 2020-10-16 ENCOUNTER — PATIENT OUTREACH (OUTPATIENT)
Dept: OTHER | Facility: OTHER | Age: 35
End: 2020-10-16

## 2020-10-28 ENCOUNTER — PATIENT OUTREACH (OUTPATIENT)
Dept: OTHER | Facility: OTHER | Age: 35
End: 2020-10-28

## 2020-10-28 NOTE — PROGRESS NOTES
Digital Medicine: Health  Follow-Up    The history is provided by the patient.             Reason for review: Blood pressure not at goal    Patient needs assistance troubleshooting: patient reminder needed.      Topics Covered on Call: physical activity and Diet    Additional Follow-up details: Patient stated she is doing well. She has not been taking readings because she has been too busy. Requested readings every other day with a minimum of weekly readings. Patient stated she will try.            Diet-no change to diet    No change to diet.        Physical Activity-no change to routine  No change to exercise routine.     Medication Adherence-Medication Adherence not addressed.      Substance, Sleep, Stress-Not assessed      Provided patient education.       Addressed patient questions and patient has my contact information if needed prior to next outreach. Patient verbalizes understanding.      Explained the importance of self-monitoring and medication adherence. Encouraged the patient to communicate with their health  for lifestyle modifications to help improve or maintain a healthy lifestyle.               There are no preventive care reminders to display for this patient.      Last 5 Patient Entered Readings                                      Current 30 Day Average: 148/93     Recent Readings 10/7/2020 10/6/2020 10/6/2020 9/29/2020    SBP (mmHg) 164 145 149 135    DBP (mmHg) 100 79 85 106    Pulse 83 76 72 74

## 2020-11-20 NOTE — PROGRESS NOTES
Digital Medicine: Clinician Introduction    Malgorzata Yoder is a 35 y.o. female who is newly enrolled in the Digital Medicine [facility].    Called patient regarding HDMP (Hypertension Digital Medicine Program)    Patient takes amlodipine 5 mg every morning and labetalol 200 mg BID. She knows to wait at least an hour after her medication to check her BP.    Patient states she has been busy with watching her kids to check her BP. Patient also states she is unsure if she will have kids and will let us know if she becomes pregnant.       The history is provided by the patient.      Review of patient's allergies indicates:  No Known Allergies  Completed Medication Reconciliation  Verified pharmacy information.    HYPERTENSION  Explained hypertension digital medicine goals including BP goal less than or equal to 130/80mmHg, improved convenience of BP management and reduced risk of heart attack, kidney failure, stroke, eye disease, dementia, and death.     Explained non-pharmacologic therapies like low salt diet and physical activity can reduce blood pressure.       Explained that we expect patient to submit several blood pressure readings per week at random times of the day, but at least 30 minutes after taking blood pressure medications. Instructed patient not to allow anyone else to use their blood pressure monitor and phone as data submitted is directly entered into medical record. Reviewed and confirmed appropriate blood pressure monitoring technique.         Reviewed signs/symptoms of hypertension (headache, changes in vision, chest pain, shortness of breath)   Reviewed signs/symptoms of hypotension (lightheaded, dizziness, weakness)     Patient's BP goal is less than or equal to 130/80. Patients BP average is Not Enough Data/Not Enough Data mmHg, which is above goal, per 2017 ACC/AHA Hypertension Guidelines.         Last 5 Patient Entered Readings                                      Current 30 Day Average:       Recent Readings 10/7/2020 10/6/2020 10/6/2020 9/29/2020    SBP (mmHg) 164 145 149 135    DBP (mmHg) 100 79 85 106    Pulse 83 76 72 74                Depression Screening  Malgorzata Yoder did not answer the depression screening. She is not in treatment for depression and is not interested in a referral. Patient feels that depression symptoms are currently controlled.     Sleep Apnea Screening  Patient not previously diagnosed with MICHELLE       Medication Affordability Screening  Patient did not answer the medication affordability questionnaires. Patient is currently not having problems affording medications    Medication Adherence-Medication adherence was assessed.  Patient continue taking medication as prescribed.            ASSESSMENT(S)  Patients BP average is Not Enough Data/Not Enough Data mmHg, which is above goal. Patient's BP goal is less than or equal to 130/80.     Hypertension Plan  Additional monitoring needed. Encouraged patient to check BP at least 3x/week  Continue current therapy.  Will call patient in a few weeks, sooner if needed. Patient knows to reach out at any time for any questions or concerns.        Addressed patient questions and patient has my contact information if needed prior to next outreach. Patient verbalizes understanding.      Explained the importance of self-monitoring and medication adherence. Encouraged the patient to communicate with their health  for lifestyle modifications to help improve or maintain a healthy lifestyle.        Sent link to [facility] Digital [facility] webpages and my contact information via [employer] for future questions.        Explained to the patient that the Digital Medicine team is not available for emergencies. Advised patient call [facility] ([phone] or [phone]) or 911 if needed.            There are no preventive care reminders to display for this patient.       Current Medication Regimen:  Hypertension Medications              amLODIPine (NORVASC) 5 MG tablet Take 1 tablet (5 mg total) by mouth once daily.    labetalol (NORMODYNE) 200 MG tablet Take 1 tablet (200 mg total) by mouth every 12 (twelve) hours.          Tammi Villalpando, PharmD  Digital Medicine Clinician  [phone]

## 2020-12-11 ENCOUNTER — PATIENT OUTREACH (OUTPATIENT)
Dept: OTHER | Facility: OTHER | Age: 35
End: 2020-12-11

## 2021-02-09 ENCOUNTER — TELEPHONE (OUTPATIENT)
Dept: OBSTETRICS AND GYNECOLOGY | Facility: CLINIC | Age: 36
End: 2021-02-09

## 2021-04-13 ENCOUNTER — OFFICE VISIT (OUTPATIENT)
Dept: INTERNAL MEDICINE | Facility: CLINIC | Age: 36
End: 2021-04-13
Payer: COMMERCIAL

## 2021-04-13 ENCOUNTER — LAB VISIT (OUTPATIENT)
Dept: LAB | Facility: HOSPITAL | Age: 36
End: 2021-04-13
Attending: FAMILY MEDICINE
Payer: COMMERCIAL

## 2021-04-13 VITALS
BODY MASS INDEX: 41.31 KG/M2 | WEIGHT: 248.25 LBS | DIASTOLIC BLOOD PRESSURE: 82 MMHG | OXYGEN SATURATION: 98 % | TEMPERATURE: 98 F | SYSTOLIC BLOOD PRESSURE: 130 MMHG | HEART RATE: 85 BPM

## 2021-04-13 DIAGNOSIS — R53.82 CHRONIC FATIGUE: ICD-10-CM

## 2021-04-13 DIAGNOSIS — E87.6 HYPOKALEMIA: ICD-10-CM

## 2021-04-13 DIAGNOSIS — J45.20 MILD INTERMITTENT ASTHMA WITHOUT COMPLICATION: ICD-10-CM

## 2021-04-13 DIAGNOSIS — I10 ESSENTIAL HYPERTENSION: Primary | ICD-10-CM

## 2021-04-13 DIAGNOSIS — E66.01 MORBID OBESITY WITH BMI OF 40.0-44.9, ADULT: ICD-10-CM

## 2021-04-13 DIAGNOSIS — G56.03 CARPAL TUNNEL SYNDROME ON BOTH SIDES: ICD-10-CM

## 2021-04-13 DIAGNOSIS — I10 ESSENTIAL HYPERTENSION: ICD-10-CM

## 2021-04-13 DIAGNOSIS — Z01.89 NEED FOR ASSESSMENT FOR SLEEP APNEA: ICD-10-CM

## 2021-04-13 LAB
25(OH)D3+25(OH)D2 SERPL-MCNC: 23 NG/ML (ref 30–96)
ALBUMIN SERPL BCP-MCNC: 3.8 G/DL (ref 3.5–5.2)
ALP SERPL-CCNC: 68 U/L (ref 55–135)
ALT SERPL W/O P-5'-P-CCNC: 26 U/L (ref 10–44)
ANION GAP SERPL CALC-SCNC: 9 MMOL/L (ref 8–16)
AST SERPL-CCNC: 20 U/L (ref 10–40)
BILIRUB SERPL-MCNC: 0.4 MG/DL (ref 0.1–1)
BUN SERPL-MCNC: 7 MG/DL (ref 6–20)
CALCIUM SERPL-MCNC: 8.6 MG/DL (ref 8.7–10.5)
CHLORIDE SERPL-SCNC: 103 MMOL/L (ref 95–110)
CHOLEST SERPL-MCNC: 175 MG/DL (ref 120–199)
CHOLEST/HDLC SERPL: 4.1 {RATIO} (ref 2–5)
CO2 SERPL-SCNC: 26 MMOL/L (ref 23–29)
CREAT SERPL-MCNC: 0.9 MG/DL (ref 0.5–1.4)
EST. GFR  (AFRICAN AMERICAN): >60 ML/MIN/1.73 M^2
EST. GFR  (NON AFRICAN AMERICAN): >60 ML/MIN/1.73 M^2
ESTIMATED AVG GLUCOSE: 97 MG/DL (ref 68–131)
GLUCOSE SERPL-MCNC: 114 MG/DL (ref 70–110)
HBA1C MFR BLD: 5 % (ref 4–5.6)
HDLC SERPL-MCNC: 43 MG/DL (ref 40–75)
HDLC SERPL: 24.6 % (ref 20–50)
LDLC SERPL CALC-MCNC: 114 MG/DL (ref 63–159)
NONHDLC SERPL-MCNC: 132 MG/DL
POTASSIUM SERPL-SCNC: 2.9 MMOL/L (ref 3.5–5.1)
PROT SERPL-MCNC: 8.1 G/DL (ref 6–8.4)
SODIUM SERPL-SCNC: 138 MMOL/L (ref 136–145)
TRIGL SERPL-MCNC: 90 MG/DL (ref 30–150)
TSH SERPL DL<=0.005 MIU/L-ACNC: 0.61 UIU/ML (ref 0.4–4)
VIT B12 SERPL-MCNC: 746 PG/ML (ref 210–950)

## 2021-04-13 PROCEDURE — 3075F PR MOST RECENT SYSTOLIC BLOOD PRESS GE 130-139MM HG: ICD-10-PCS | Mod: CPTII,S$GLB,, | Performed by: FAMILY MEDICINE

## 2021-04-13 PROCEDURE — 1126F PR PAIN SEVERITY QUANTIFIED, NO PAIN PRESENT: ICD-10-PCS | Mod: S$GLB,,, | Performed by: FAMILY MEDICINE

## 2021-04-13 PROCEDURE — 99999 PR PBB SHADOW E&M-EST. PATIENT-LVL III: CPT | Mod: PBBFAC,,, | Performed by: FAMILY MEDICINE

## 2021-04-13 PROCEDURE — 99999 PR PBB SHADOW E&M-EST. PATIENT-LVL III: ICD-10-PCS | Mod: PBBFAC,,, | Performed by: FAMILY MEDICINE

## 2021-04-13 PROCEDURE — 99214 OFFICE O/P EST MOD 30 MIN: CPT | Mod: S$GLB,,, | Performed by: FAMILY MEDICINE

## 2021-04-13 PROCEDURE — 3008F BODY MASS INDEX DOCD: CPT | Mod: CPTII,S$GLB,, | Performed by: FAMILY MEDICINE

## 2021-04-13 PROCEDURE — 1126F AMNT PAIN NOTED NONE PRSNT: CPT | Mod: S$GLB,,, | Performed by: FAMILY MEDICINE

## 2021-04-13 PROCEDURE — 83036 HEMOGLOBIN GLYCOSYLATED A1C: CPT | Performed by: FAMILY MEDICINE

## 2021-04-13 PROCEDURE — 3079F PR MOST RECENT DIASTOLIC BLOOD PRESSURE 80-89 MM HG: ICD-10-PCS | Mod: CPTII,S$GLB,, | Performed by: FAMILY MEDICINE

## 2021-04-13 PROCEDURE — 80061 LIPID PANEL: CPT | Performed by: FAMILY MEDICINE

## 2021-04-13 PROCEDURE — 84443 ASSAY THYROID STIM HORMONE: CPT | Performed by: FAMILY MEDICINE

## 2021-04-13 PROCEDURE — 3075F SYST BP GE 130 - 139MM HG: CPT | Mod: CPTII,S$GLB,, | Performed by: FAMILY MEDICINE

## 2021-04-13 PROCEDURE — 99214 PR OFFICE/OUTPT VISIT, EST, LEVL IV, 30-39 MIN: ICD-10-PCS | Mod: S$GLB,,, | Performed by: FAMILY MEDICINE

## 2021-04-13 PROCEDURE — 3008F PR BODY MASS INDEX (BMI) DOCUMENTED: ICD-10-PCS | Mod: CPTII,S$GLB,, | Performed by: FAMILY MEDICINE

## 2021-04-13 PROCEDURE — 82607 VITAMIN B-12: CPT | Performed by: FAMILY MEDICINE

## 2021-04-13 PROCEDURE — 82306 VITAMIN D 25 HYDROXY: CPT | Performed by: FAMILY MEDICINE

## 2021-04-13 PROCEDURE — 80053 COMPREHEN METABOLIC PANEL: CPT | Performed by: FAMILY MEDICINE

## 2021-04-13 PROCEDURE — 3079F DIAST BP 80-89 MM HG: CPT | Mod: CPTII,S$GLB,, | Performed by: FAMILY MEDICINE

## 2021-04-13 PROCEDURE — 36415 COLL VENOUS BLD VENIPUNCTURE: CPT | Performed by: FAMILY MEDICINE

## 2021-04-13 RX ORDER — AMLODIPINE BESYLATE 5 MG/1
5 TABLET ORAL DAILY
Qty: 90 TABLET | Refills: 1 | Status: SHIPPED | OUTPATIENT
Start: 2021-04-13 | End: 2022-02-23 | Stop reason: SDUPTHER

## 2021-04-13 RX ORDER — LABETALOL 200 MG/1
200 TABLET, FILM COATED ORAL EVERY 12 HOURS
Qty: 180 TABLET | Refills: 1 | Status: SHIPPED | OUTPATIENT
Start: 2021-04-13 | End: 2021-11-23

## 2021-04-14 DIAGNOSIS — E87.6 HYPOKALEMIA: ICD-10-CM

## 2021-04-14 DIAGNOSIS — E55.9 VITAMIN D DEFICIENCY: Primary | ICD-10-CM

## 2021-04-14 RX ORDER — POTASSIUM CHLORIDE 20 MEQ/1
20 TABLET, EXTENDED RELEASE ORAL 2 TIMES DAILY
Qty: 60 TABLET | Refills: 3 | Status: SHIPPED | OUTPATIENT
Start: 2021-04-14 | End: 2021-08-19

## 2021-04-14 RX ORDER — ERGOCALCIFEROL 1.25 MG/1
50000 CAPSULE ORAL
Qty: 10 CAPSULE | Refills: 0 | Status: SHIPPED | OUTPATIENT
Start: 2021-04-14 | End: 2021-07-28 | Stop reason: SDUPTHER

## 2021-04-21 ENCOUNTER — LAB VISIT (OUTPATIENT)
Dept: LAB | Facility: HOSPITAL | Age: 36
End: 2021-04-21
Attending: FAMILY MEDICINE
Payer: COMMERCIAL

## 2021-04-21 DIAGNOSIS — E87.6 HYPOKALEMIA: ICD-10-CM

## 2021-04-21 PROCEDURE — 36415 COLL VENOUS BLD VENIPUNCTURE: CPT | Performed by: FAMILY MEDICINE

## 2021-04-21 PROCEDURE — 84132 ASSAY OF SERUM POTASSIUM: CPT | Performed by: FAMILY MEDICINE

## 2021-04-22 LAB — POTASSIUM SERPL-SCNC: 3.4 MMOL/L (ref 3.5–5.1)

## 2021-07-06 ENCOUNTER — TELEPHONE (OUTPATIENT)
Dept: PULMONOLOGY | Facility: CLINIC | Age: 36
End: 2021-07-06

## 2021-07-06 DIAGNOSIS — R05.9 COUGH: Primary | ICD-10-CM

## 2021-07-26 ENCOUNTER — OFFICE VISIT (OUTPATIENT)
Dept: PULMONOLOGY | Facility: CLINIC | Age: 36
End: 2021-07-26
Payer: COMMERCIAL

## 2021-07-26 ENCOUNTER — HOSPITAL ENCOUNTER (OUTPATIENT)
Dept: RADIOLOGY | Facility: HOSPITAL | Age: 36
Discharge: HOME | End: 2021-07-26
Attending: INTERNAL MEDICINE
Payer: COMMERCIAL

## 2021-07-26 ENCOUNTER — LAB VISIT (OUTPATIENT)
Dept: LAB | Facility: HOSPITAL | Age: 36
End: 2021-07-26
Attending: INTERNAL MEDICINE
Payer: COMMERCIAL

## 2021-07-26 VITALS
RESPIRATION RATE: 16 BRPM | OXYGEN SATURATION: 97 % | DIASTOLIC BLOOD PRESSURE: 80 MMHG | SYSTOLIC BLOOD PRESSURE: 145 MMHG | WEIGHT: 244.5 LBS | HEIGHT: 65 IN | HEART RATE: 93 BPM | BODY MASS INDEX: 40.73 KG/M2

## 2021-07-26 DIAGNOSIS — I10 ESSENTIAL HYPERTENSION: ICD-10-CM

## 2021-07-26 DIAGNOSIS — E66.01 MORBID OBESITY WITH BMI OF 40.0-44.9, ADULT: ICD-10-CM

## 2021-07-26 DIAGNOSIS — R53.83 FATIGUE, UNSPECIFIED TYPE: ICD-10-CM

## 2021-07-26 DIAGNOSIS — R91.8 ABNORMALITY OF LUNG ON CXR: ICD-10-CM

## 2021-07-26 DIAGNOSIS — R05.9 COUGH: ICD-10-CM

## 2021-07-26 DIAGNOSIS — G47.33 OSA (OBSTRUCTIVE SLEEP APNEA): Primary | ICD-10-CM

## 2021-07-26 DIAGNOSIS — R53.82 CHRONIC FATIGUE: ICD-10-CM

## 2021-07-26 DIAGNOSIS — G47.33 OSA (OBSTRUCTIVE SLEEP APNEA): ICD-10-CM

## 2021-07-26 DIAGNOSIS — Z01.89 NEED FOR ASSESSMENT FOR SLEEP APNEA: ICD-10-CM

## 2021-07-26 LAB
25(OH)D3+25(OH)D2 SERPL-MCNC: 23 NG/ML (ref 30–96)
BASOPHILS # BLD AUTO: 0.01 K/UL (ref 0–0.2)
BASOPHILS NFR BLD: 0.3 % (ref 0–1.9)
DIFFERENTIAL METHOD: ABNORMAL
EOSINOPHIL # BLD AUTO: 0 K/UL (ref 0–0.5)
EOSINOPHIL NFR BLD: 0.6 % (ref 0–8)
ERYTHROCYTE [DISTWIDTH] IN BLOOD BY AUTOMATED COUNT: 14.5 % (ref 11.5–14.5)
HCT VFR BLD AUTO: 34.4 % (ref 37–48.5)
HGB BLD-MCNC: 11.2 G/DL (ref 12–16)
IMM GRANULOCYTES # BLD AUTO: 0.01 K/UL (ref 0–0.04)
IMM GRANULOCYTES NFR BLD AUTO: 0.3 % (ref 0–0.5)
LYMPHOCYTES # BLD AUTO: 1.4 K/UL (ref 1–4.8)
LYMPHOCYTES NFR BLD: 42.5 % (ref 18–48)
MCH RBC QN AUTO: 28.9 PG (ref 27–31)
MCHC RBC AUTO-ENTMCNC: 32.6 G/DL (ref 32–36)
MCV RBC AUTO: 89 FL (ref 82–98)
MONOCYTES # BLD AUTO: 0.4 K/UL (ref 0.3–1)
MONOCYTES NFR BLD: 10.5 % (ref 4–15)
NEUTROPHILS # BLD AUTO: 1.5 K/UL (ref 1.8–7.7)
NEUTROPHILS NFR BLD: 45.8 % (ref 38–73)
NRBC BLD-RTO: 0 /100 WBC
PLATELET # BLD AUTO: 182 K/UL (ref 150–450)
PMV BLD AUTO: 12.5 FL (ref 9.2–12.9)
RBC # BLD AUTO: 3.88 M/UL (ref 4–5.4)
WBC # BLD AUTO: 3.32 K/UL (ref 3.9–12.7)

## 2021-07-26 PROCEDURE — 71046 X-RAY EXAM CHEST 2 VIEWS: CPT | Mod: TC

## 2021-07-26 PROCEDURE — 71046 X-RAY EXAM CHEST 2 VIEWS: CPT | Mod: 26,,, | Performed by: RADIOLOGY

## 2021-07-26 PROCEDURE — 99204 PR OFFICE/OUTPT VISIT, NEW, LEVL IV, 45-59 MIN: ICD-10-PCS | Mod: S$GLB,,, | Performed by: INTERNAL MEDICINE

## 2021-07-26 PROCEDURE — 3077F SYST BP >= 140 MM HG: CPT | Mod: CPTII,S$GLB,, | Performed by: INTERNAL MEDICINE

## 2021-07-26 PROCEDURE — 3079F PR MOST RECENT DIASTOLIC BLOOD PRESSURE 80-89 MM HG: ICD-10-PCS | Mod: CPTII,S$GLB,, | Performed by: INTERNAL MEDICINE

## 2021-07-26 PROCEDURE — 99999 PR PBB SHADOW E&M-EST. PATIENT-LVL V: ICD-10-PCS | Mod: PBBFAC,,, | Performed by: INTERNAL MEDICINE

## 2021-07-26 PROCEDURE — 85025 COMPLETE CBC W/AUTO DIFF WBC: CPT | Performed by: INTERNAL MEDICINE

## 2021-07-26 PROCEDURE — 82306 VITAMIN D 25 HYDROXY: CPT | Performed by: INTERNAL MEDICINE

## 2021-07-26 PROCEDURE — 36415 COLL VENOUS BLD VENIPUNCTURE: CPT | Performed by: INTERNAL MEDICINE

## 2021-07-26 PROCEDURE — 3008F PR BODY MASS INDEX (BMI) DOCUMENTED: ICD-10-PCS | Mod: CPTII,S$GLB,, | Performed by: INTERNAL MEDICINE

## 2021-07-26 PROCEDURE — 99204 OFFICE O/P NEW MOD 45 MIN: CPT | Mod: S$GLB,,, | Performed by: INTERNAL MEDICINE

## 2021-07-26 PROCEDURE — 1159F PR MEDICATION LIST DOCUMENTED IN MEDICAL RECORD: ICD-10-PCS | Mod: CPTII,S$GLB,, | Performed by: INTERNAL MEDICINE

## 2021-07-26 PROCEDURE — 71046 XR CHEST PA AND LATERAL: ICD-10-PCS | Mod: 26,,, | Performed by: RADIOLOGY

## 2021-07-26 PROCEDURE — 3077F PR MOST RECENT SYSTOLIC BLOOD PRESSURE >= 140 MM HG: ICD-10-PCS | Mod: CPTII,S$GLB,, | Performed by: INTERNAL MEDICINE

## 2021-07-26 PROCEDURE — 99999 PR PBB SHADOW E&M-EST. PATIENT-LVL V: CPT | Mod: PBBFAC,,, | Performed by: INTERNAL MEDICINE

## 2021-07-26 PROCEDURE — 3008F BODY MASS INDEX DOCD: CPT | Mod: CPTII,S$GLB,, | Performed by: INTERNAL MEDICINE

## 2021-07-26 PROCEDURE — 1159F MED LIST DOCD IN RCRD: CPT | Mod: CPTII,S$GLB,, | Performed by: INTERNAL MEDICINE

## 2021-07-26 PROCEDURE — 3079F DIAST BP 80-89 MM HG: CPT | Mod: CPTII,S$GLB,, | Performed by: INTERNAL MEDICINE

## 2021-07-28 ENCOUNTER — TELEPHONE (OUTPATIENT)
Dept: PULMONOLOGY | Facility: CLINIC | Age: 36
End: 2021-07-28

## 2021-07-28 DIAGNOSIS — E55.9 VITAMIN D DEFICIENCY: ICD-10-CM

## 2021-07-28 RX ORDER — ERGOCALCIFEROL 1.25 MG/1
50000 CAPSULE ORAL
Qty: 10 CAPSULE | Refills: 2 | Status: SHIPPED | OUTPATIENT
Start: 2021-07-28 | End: 2022-02-23

## 2021-07-30 ENCOUNTER — TELEPHONE (OUTPATIENT)
Dept: INTERNAL MEDICINE | Facility: CLINIC | Age: 36
End: 2021-07-30

## 2021-07-31 ENCOUNTER — TELEPHONE (OUTPATIENT)
Dept: URGENT CARE | Facility: CLINIC | Age: 36
End: 2021-07-31

## 2021-07-31 ENCOUNTER — PATIENT MESSAGE (OUTPATIENT)
Dept: ADMINISTRATIVE | Facility: OTHER | Age: 36
End: 2021-07-31

## 2021-07-31 ENCOUNTER — PATIENT MESSAGE (OUTPATIENT)
Dept: ADMINISTRATIVE | Facility: CLINIC | Age: 36
End: 2021-07-31

## 2021-07-31 ENCOUNTER — CLINICAL SUPPORT (OUTPATIENT)
Dept: URGENT CARE | Facility: CLINIC | Age: 36
End: 2021-07-31
Payer: COMMERCIAL

## 2021-07-31 ENCOUNTER — NURSE TRIAGE (OUTPATIENT)
Dept: ADMINISTRATIVE | Facility: CLINIC | Age: 36
End: 2021-07-31

## 2021-07-31 DIAGNOSIS — Z11.52 ENCOUNTER FOR SCREENING FOR COVID-19: Primary | ICD-10-CM

## 2021-07-31 DIAGNOSIS — U07.1 COVID-19: Primary | ICD-10-CM

## 2021-07-31 LAB
CTP QC/QA: YES
SARS-COV-2 RDRP RESP QL NAA+PROBE: POSITIVE

## 2021-07-31 PROCEDURE — 99211 OFF/OP EST MAY X REQ PHY/QHP: CPT | Mod: S$GLB,CS,, | Performed by: EMERGENCY MEDICINE

## 2021-07-31 PROCEDURE — 99211 PR OFFICE/OUTPT VISIT, EST, LEVL I: ICD-10-PCS | Mod: S$GLB,CS,, | Performed by: EMERGENCY MEDICINE

## 2021-07-31 PROCEDURE — U0002: ICD-10-PCS | Mod: QW,S$GLB,, | Performed by: EMERGENCY MEDICINE

## 2021-07-31 PROCEDURE — U0002 COVID-19 LAB TEST NON-CDC: HCPCS | Mod: QW,S$GLB,, | Performed by: EMERGENCY MEDICINE

## 2021-08-01 ENCOUNTER — PATIENT MESSAGE (OUTPATIENT)
Dept: ADMINISTRATIVE | Facility: CLINIC | Age: 36
End: 2021-08-01

## 2021-08-02 ENCOUNTER — PATIENT MESSAGE (OUTPATIENT)
Dept: ADMINISTRATIVE | Facility: CLINIC | Age: 36
End: 2021-08-02

## 2021-08-03 ENCOUNTER — NURSE TRIAGE (OUTPATIENT)
Dept: ADMINISTRATIVE | Facility: CLINIC | Age: 36
End: 2021-08-03

## 2021-08-03 ENCOUNTER — INFUSION (OUTPATIENT)
Dept: INFECTIOUS DISEASES | Facility: HOSPITAL | Age: 36
End: 2021-08-03
Attending: EMERGENCY MEDICINE
Payer: COMMERCIAL

## 2021-08-03 VITALS
OXYGEN SATURATION: 99 % | SYSTOLIC BLOOD PRESSURE: 130 MMHG | HEART RATE: 80 BPM | TEMPERATURE: 98 F | RESPIRATION RATE: 16 BRPM | DIASTOLIC BLOOD PRESSURE: 84 MMHG

## 2021-08-03 DIAGNOSIS — U07.1 COVID-19: Primary | ICD-10-CM

## 2021-08-03 PROCEDURE — 25000003 PHARM REV CODE 250: Performed by: INTERNAL MEDICINE

## 2021-08-03 PROCEDURE — M0243 CASIRIVI AND IMDEVI INFUSION: HCPCS | Performed by: INTERNAL MEDICINE

## 2021-08-03 PROCEDURE — 63600175 PHARM REV CODE 636 W HCPCS: Performed by: INTERNAL MEDICINE

## 2021-08-03 RX ORDER — ONDANSETRON 4 MG/1
4 TABLET, ORALLY DISINTEGRATING ORAL ONCE AS NEEDED
Status: DISCONTINUED | OUTPATIENT
Start: 2021-08-03 | End: 2022-02-23

## 2021-08-03 RX ORDER — EPINEPHRINE 0.3 MG/.3ML
0.3 INJECTION SUBCUTANEOUS
Status: DISCONTINUED | OUTPATIENT
Start: 2021-08-03 | End: 2022-02-23

## 2021-08-03 RX ORDER — ALBUTEROL SULFATE 90 UG/1
2 AEROSOL, METERED RESPIRATORY (INHALATION)
Status: DISCONTINUED | OUTPATIENT
Start: 2021-08-03 | End: 2022-02-23

## 2021-08-03 RX ORDER — ACETAMINOPHEN 325 MG/1
650 TABLET ORAL ONCE AS NEEDED
Status: DISCONTINUED | OUTPATIENT
Start: 2021-08-03 | End: 2022-02-23

## 2021-08-03 RX ORDER — SODIUM CHLORIDE 0.9 % (FLUSH) 0.9 %
10 SYRINGE (ML) INJECTION
Status: DISCONTINUED | OUTPATIENT
Start: 2021-08-03 | End: 2022-02-23

## 2021-08-03 RX ORDER — DIPHENHYDRAMINE HYDROCHLORIDE 50 MG/ML
25 INJECTION INTRAMUSCULAR; INTRAVENOUS ONCE AS NEEDED
Status: DISCONTINUED | OUTPATIENT
Start: 2021-08-03 | End: 2022-02-23

## 2021-08-03 RX ADMIN — CASIRIVIMAB AND IMDEVIMAB 600 MG: 600; 600 INJECTION, SOLUTION, CONCENTRATE INTRAVENOUS at 02:08

## 2021-08-23 ENCOUNTER — PROCEDURE VISIT (OUTPATIENT)
Dept: SLEEP MEDICINE | Facility: CLINIC | Age: 36
End: 2021-08-23
Payer: COMMERCIAL

## 2021-08-23 ENCOUNTER — HOSPITAL ENCOUNTER (OUTPATIENT)
Dept: RADIOLOGY | Facility: HOSPITAL | Age: 36
Discharge: HOME | End: 2021-08-23
Attending: INTERNAL MEDICINE
Payer: COMMERCIAL

## 2021-08-23 DIAGNOSIS — G47.33 OSA (OBSTRUCTIVE SLEEP APNEA): ICD-10-CM

## 2021-08-23 DIAGNOSIS — R91.8 ABNORMALITY OF LUNG ON CXR: ICD-10-CM

## 2021-08-23 PROCEDURE — 71250 CT THORAX DX C-: CPT | Mod: TC

## 2021-08-24 PROCEDURE — 95806 SLEEP STUDY UNATT&RESP EFFT: CPT | Mod: 26,52,S$GLB, | Performed by: INTERNAL MEDICINE

## 2021-08-24 PROCEDURE — 95806 PR SLEEP STUDY, UNATTENDED, SIMUL RECORD HR/O2 SAT/RESP FLOW/RESP EFFT: ICD-10-PCS | Mod: 26,52,S$GLB, | Performed by: INTERNAL MEDICINE

## 2021-08-25 DIAGNOSIS — E04.1 THYROID NODULE: Primary | ICD-10-CM

## 2021-08-27 ENCOUNTER — TELEPHONE (OUTPATIENT)
Dept: PULMONOLOGY | Facility: CLINIC | Age: 36
End: 2021-08-27

## 2021-09-08 ENCOUNTER — TELEPHONE (OUTPATIENT)
Dept: PULMONOLOGY | Facility: CLINIC | Age: 36
End: 2021-09-08

## 2022-02-23 ENCOUNTER — LAB VISIT (OUTPATIENT)
Dept: LAB | Facility: HOSPITAL | Age: 37
End: 2022-02-23
Payer: COMMERCIAL

## 2022-02-23 ENCOUNTER — LAB VISIT (OUTPATIENT)
Dept: LAB | Facility: HOSPITAL | Age: 37
End: 2022-02-23
Attending: FAMILY MEDICINE
Payer: COMMERCIAL

## 2022-02-23 ENCOUNTER — OFFICE VISIT (OUTPATIENT)
Dept: INTERNAL MEDICINE | Facility: CLINIC | Age: 37
End: 2022-02-23
Payer: COMMERCIAL

## 2022-02-23 VITALS
DIASTOLIC BLOOD PRESSURE: 82 MMHG | SYSTOLIC BLOOD PRESSURE: 124 MMHG | OXYGEN SATURATION: 98 % | HEART RATE: 86 BPM | WEIGHT: 238.56 LBS | BODY MASS INDEX: 39.69 KG/M2

## 2022-02-23 DIAGNOSIS — E66.01 SEVERE OBESITY (BMI 35.0-39.9) WITH COMORBIDITY: ICD-10-CM

## 2022-02-23 DIAGNOSIS — I10 ESSENTIAL HYPERTENSION: ICD-10-CM

## 2022-02-23 DIAGNOSIS — E87.6 HYPOKALEMIA: ICD-10-CM

## 2022-02-23 DIAGNOSIS — Z00.00 ROUTINE GENERAL MEDICAL EXAMINATION AT A HEALTH CARE FACILITY: Primary | ICD-10-CM

## 2022-02-23 DIAGNOSIS — Z00.00 ROUTINE GENERAL MEDICAL EXAMINATION AT A HEALTH CARE FACILITY: ICD-10-CM

## 2022-02-23 LAB
ALBUMIN SERPL BCP-MCNC: 3.7 G/DL (ref 3.5–5.2)
ALP SERPL-CCNC: 83 U/L (ref 55–135)
ALT SERPL W/O P-5'-P-CCNC: 18 U/L (ref 10–44)
ANION GAP SERPL CALC-SCNC: 12 MMOL/L (ref 8–16)
AST SERPL-CCNC: 17 U/L (ref 10–40)
BACTERIA #/AREA URNS HPF: ABNORMAL /HPF
BASOPHILS # BLD AUTO: 0.04 K/UL (ref 0–0.2)
BASOPHILS NFR BLD: 0.7 % (ref 0–1.9)
BILIRUB SERPL-MCNC: 0.5 MG/DL (ref 0.1–1)
BILIRUB UR QL STRIP: ABNORMAL
BUN SERPL-MCNC: 9 MG/DL (ref 6–20)
CALCIUM SERPL-MCNC: 9.2 MG/DL (ref 8.7–10.5)
CHLORIDE SERPL-SCNC: 99 MMOL/L (ref 95–110)
CHOLEST SERPL-MCNC: 177 MG/DL (ref 120–199)
CHOLEST/HDLC SERPL: 3.8 {RATIO} (ref 2–5)
CLARITY UR: ABNORMAL
CO2 SERPL-SCNC: 27 MMOL/L (ref 23–29)
COLOR UR: ABNORMAL
CREAT SERPL-MCNC: 1.1 MG/DL (ref 0.5–1.4)
DIFFERENTIAL METHOD: ABNORMAL
EOSINOPHIL # BLD AUTO: 0.1 K/UL (ref 0–0.5)
EOSINOPHIL NFR BLD: 2.5 % (ref 0–8)
ERYTHROCYTE [DISTWIDTH] IN BLOOD BY AUTOMATED COUNT: 14.4 % (ref 11.5–14.5)
EST. GFR  (AFRICAN AMERICAN): >60 ML/MIN/1.73 M^2
EST. GFR  (NON AFRICAN AMERICAN): >60 ML/MIN/1.73 M^2
GLUCOSE SERPL-MCNC: 107 MG/DL (ref 70–110)
GLUCOSE UR QL STRIP: NEGATIVE
HCT VFR BLD AUTO: 36.4 % (ref 37–48.5)
HDLC SERPL-MCNC: 46 MG/DL (ref 40–75)
HDLC SERPL: 26 % (ref 20–50)
HGB BLD-MCNC: 11.6 G/DL (ref 12–16)
HGB UR QL STRIP: ABNORMAL
HYALINE CASTS #/AREA URNS LPF: 5 /LPF
IMM GRANULOCYTES # BLD AUTO: 0 K/UL (ref 0–0.04)
IMM GRANULOCYTES NFR BLD AUTO: 0 % (ref 0–0.5)
KETONES UR QL STRIP: ABNORMAL
LDLC SERPL CALC-MCNC: 112.2 MG/DL (ref 63–159)
LEUKOCYTE ESTERASE UR QL STRIP: NEGATIVE
LYMPHOCYTES # BLD AUTO: 2.2 K/UL (ref 1–4.8)
LYMPHOCYTES NFR BLD: 38.7 % (ref 18–48)
MCH RBC QN AUTO: 28.9 PG (ref 27–31)
MCHC RBC AUTO-ENTMCNC: 31.9 G/DL (ref 32–36)
MCV RBC AUTO: 91 FL (ref 82–98)
MICROSCOPIC COMMENT: ABNORMAL
MONOCYTES # BLD AUTO: 0.3 K/UL (ref 0.3–1)
MONOCYTES NFR BLD: 4.8 % (ref 4–15)
NEUTROPHILS # BLD AUTO: 3 K/UL (ref 1.8–7.7)
NEUTROPHILS NFR BLD: 53.3 % (ref 38–73)
NITRITE UR QL STRIP: NEGATIVE
NONHDLC SERPL-MCNC: 131 MG/DL
NRBC BLD-RTO: 0 /100 WBC
PH UR STRIP: 7 [PH] (ref 5–8)
PLATELET # BLD AUTO: 251 K/UL (ref 150–450)
PMV BLD AUTO: 11.8 FL (ref 9.2–12.9)
POTASSIUM SERPL-SCNC: 2.9 MMOL/L (ref 3.5–5.1)
PROT SERPL-MCNC: 8 G/DL (ref 6–8.4)
PROT UR QL STRIP: ABNORMAL
RBC # BLD AUTO: 4.02 M/UL (ref 4–5.4)
RBC #/AREA URNS HPF: 0 /HPF (ref 0–4)
SODIUM SERPL-SCNC: 138 MMOL/L (ref 136–145)
SP GR UR STRIP: 1.02 (ref 1–1.03)
TRIGL SERPL-MCNC: 94 MG/DL (ref 30–150)
TSH SERPL DL<=0.005 MIU/L-ACNC: 0.41 UIU/ML (ref 0.4–4)
URN SPEC COLLECT METH UR: ABNORMAL
WBC # BLD AUTO: 5.61 K/UL (ref 3.9–12.7)
WBC #/AREA URNS HPF: 12 /HPF (ref 0–5)

## 2022-02-23 PROCEDURE — 81000 URINALYSIS NONAUTO W/SCOPE: CPT | Performed by: FAMILY MEDICINE

## 2022-02-23 PROCEDURE — 1160F PR REVIEW ALL MEDS BY PRESCRIBER/CLIN PHARMACIST DOCUMENTED: ICD-10-PCS | Mod: CPTII,S$GLB,, | Performed by: FAMILY MEDICINE

## 2022-02-23 PROCEDURE — 3074F SYST BP LT 130 MM HG: CPT | Mod: CPTII,S$GLB,, | Performed by: FAMILY MEDICINE

## 2022-02-23 PROCEDURE — 99395 PREV VISIT EST AGE 18-39: CPT | Mod: S$GLB,,, | Performed by: FAMILY MEDICINE

## 2022-02-23 PROCEDURE — 87086 URINE CULTURE/COLONY COUNT: CPT | Performed by: FAMILY MEDICINE

## 2022-02-23 PROCEDURE — 87147 CULTURE TYPE IMMUNOLOGIC: CPT | Performed by: FAMILY MEDICINE

## 2022-02-23 PROCEDURE — 85025 COMPLETE CBC W/AUTO DIFF WBC: CPT | Performed by: FAMILY MEDICINE

## 2022-02-23 PROCEDURE — 84443 ASSAY THYROID STIM HORMONE: CPT | Performed by: FAMILY MEDICINE

## 2022-02-23 PROCEDURE — 36415 COLL VENOUS BLD VENIPUNCTURE: CPT | Performed by: FAMILY MEDICINE

## 2022-02-23 PROCEDURE — 3079F PR MOST RECENT DIASTOLIC BLOOD PRESSURE 80-89 MM HG: ICD-10-PCS | Mod: CPTII,S$GLB,, | Performed by: FAMILY MEDICINE

## 2022-02-23 PROCEDURE — 99999 PR PBB SHADOW E&M-EST. PATIENT-LVL III: CPT | Mod: PBBFAC,,, | Performed by: FAMILY MEDICINE

## 2022-02-23 PROCEDURE — 87088 URINE BACTERIA CULTURE: CPT | Performed by: FAMILY MEDICINE

## 2022-02-23 PROCEDURE — 3008F BODY MASS INDEX DOCD: CPT | Mod: CPTII,S$GLB,, | Performed by: FAMILY MEDICINE

## 2022-02-23 PROCEDURE — 80053 COMPREHEN METABOLIC PANEL: CPT | Performed by: FAMILY MEDICINE

## 2022-02-23 PROCEDURE — 99999 PR PBB SHADOW E&M-EST. PATIENT-LVL III: ICD-10-PCS | Mod: PBBFAC,,, | Performed by: FAMILY MEDICINE

## 2022-02-23 PROCEDURE — 1159F MED LIST DOCD IN RCRD: CPT | Mod: CPTII,S$GLB,, | Performed by: FAMILY MEDICINE

## 2022-02-23 PROCEDURE — 80061 LIPID PANEL: CPT | Performed by: FAMILY MEDICINE

## 2022-02-23 PROCEDURE — 3008F PR BODY MASS INDEX (BMI) DOCUMENTED: ICD-10-PCS | Mod: CPTII,S$GLB,, | Performed by: FAMILY MEDICINE

## 2022-02-23 PROCEDURE — 3079F DIAST BP 80-89 MM HG: CPT | Mod: CPTII,S$GLB,, | Performed by: FAMILY MEDICINE

## 2022-02-23 PROCEDURE — 1159F PR MEDICATION LIST DOCUMENTED IN MEDICAL RECORD: ICD-10-PCS | Mod: CPTII,S$GLB,, | Performed by: FAMILY MEDICINE

## 2022-02-23 PROCEDURE — 1160F RVW MEDS BY RX/DR IN RCRD: CPT | Mod: CPTII,S$GLB,, | Performed by: FAMILY MEDICINE

## 2022-02-23 PROCEDURE — 99395 PR PREVENTIVE VISIT,EST,18-39: ICD-10-PCS | Mod: S$GLB,,, | Performed by: FAMILY MEDICINE

## 2022-02-23 PROCEDURE — 3074F PR MOST RECENT SYSTOLIC BLOOD PRESSURE < 130 MM HG: ICD-10-PCS | Mod: CPTII,S$GLB,, | Performed by: FAMILY MEDICINE

## 2022-02-23 RX ORDER — AMLODIPINE BESYLATE 5 MG/1
5 TABLET ORAL DAILY
Qty: 90 TABLET | Refills: 1 | Status: SHIPPED | OUTPATIENT
Start: 2022-02-23 | End: 2022-10-04

## 2022-02-23 RX ORDER — LABETALOL 200 MG/1
TABLET, FILM COATED ORAL
Qty: 180 TABLET | Refills: 1 | Status: SHIPPED | OUTPATIENT
Start: 2022-02-23 | End: 2022-12-13 | Stop reason: SDUPTHER

## 2022-02-23 NOTE — PROGRESS NOTES
Subjective:       Patient ID: Malgorzata Yoder is a 37 y.o. female.    Chief Complaint: Annual Exam    37-year-old  female patient with Patient Active Problem List:     Essential hypertension     Hypokalemia     Carpal tunnel syndrome on both sides     Severe obesity (BMI 35.0-39.9) with comorbidity  Here for routine annual physicals.  Patient has been taking her blood pressure medication regularly and requesting refill today.  Finished vitamin-D by prescription weekly.  Denies any discomfort secondary to carpal tunnel syndrome to bilateral hands.  Denies any numbness and has been taking potassium supplements regularly.  Has not been exercising lately except walking her daughter to the bus.   Denies any chest pain or headache with vision disturbances.     Hypertension  This is a recurrent problem. The current episode started more than 1 year ago. The problem has been gradually improving since onset. The problem is controlled. Associated symptoms include anxiety, blurred vision, headaches and malaise/fatigue. Pertinent negatives include no chest pain, neck pain, orthopnea, palpitations, peripheral edema, PND, shortness of breath or sweats. There are no associated agents to hypertension. Risk factors for coronary artery disease include obesity and stress. Past treatments include nothing. The current treatment provides moderate improvement. Compliance problems include exercise and psychosocial issues.      Review of Systems   Constitutional: Positive for malaise/fatigue. Negative for fatigue.   Eyes: Positive for blurred vision. Negative for visual disturbance.   Respiratory: Negative for shortness of breath.    Cardiovascular: Negative for chest pain, palpitations, orthopnea, leg swelling and PND.   Gastrointestinal: Negative for abdominal pain, nausea and vomiting.   Musculoskeletal: Negative for myalgias and neck pain.   Skin: Negative for rash.   Neurological: Positive for headaches. Negative  for light-headedness.   Psychiatric/Behavioral: Negative for sleep disturbance.         /82 (BP Location: Left arm, Patient Position: Sitting, BP Method: Large (Manual))   Pulse 86   Wt 108.2 kg (238 lb 8.6 oz)   LMP 02/19/2022   SpO2 98%   BMI 39.69 kg/m²   Objective:      Physical Exam  Constitutional:       Appearance: She is well-developed.   HENT:      Head: Normocephalic and atraumatic.   Cardiovascular:      Rate and Rhythm: Normal rate and regular rhythm.      Heart sounds: Normal heart sounds. No murmur heard.  Pulmonary:      Effort: Pulmonary effort is normal.      Breath sounds: Normal breath sounds. No wheezing.   Abdominal:      General: Bowel sounds are normal.      Palpations: Abdomen is soft.      Tenderness: There is no abdominal tenderness.   Skin:     General: Skin is warm and dry.      Findings: No rash.   Neurological:      Mental Status: She is alert and oriented to person, place, and time.   Psychiatric:         Mood and Affect: Mood normal.           Assessment/Plan:   1. Routine general medical examination at a health care facility  - CBC Auto Differential; Future  - Comprehensive Metabolic Panel; Future  - Lipid Panel; Future  - TSH; Future  - Urinalysis, Reflex to Urine Culture Urine, Clean Catch; Future  Vital signs stable today.  Clinical exam stable  Continue lifestyle modifications with low-fat and low-cholesterol diet and exercise 30 minutes daily  Patient quit smoking  Reports up-to-date with COVID vaccination    2. Essential hypertension  - Comprehensive Metabolic Panel; Future  - Lipid Panel; Future  - TSH; Future  - Urinalysis, Reflex to Urine Culture Urine, Clean Catch; Future  - amLODIPine (NORVASC) 5 MG tablet; Take 1 tablet (5 mg total) by mouth once daily.  Dispense: 90 tablet; Refill: 1  - labetaloL (NORMODYNE) 200 MG tablet; TAKE 1 TABLET(200 MG) BY MOUTH EVERY 12 HOURS  Dispense: 180 tablet; Refill: 1  Blood pressure is stable currently on amlodipine 5 mg and  labetalol 200 mg twice daily  Restrict salt intake and eat low-fat and low-cholesterol diet and exercise 30 minutes daily    3. Hypokalemia  - Comprehensive Metabolic Panel; Future  Stable on potassium 20 meq twice daily    4. Severe obesity (BMI 35.0-39.9) with comorbidity   Strict lifestyle changes recommended with diet and exercise to lose weight with BMI 39

## 2022-02-25 LAB — BACTERIA UR CULT: ABNORMAL

## 2022-03-22 ENCOUNTER — PATIENT MESSAGE (OUTPATIENT)
Dept: RESEARCH | Facility: HOSPITAL | Age: 37
End: 2022-03-22
Payer: COMMERCIAL

## 2022-07-07 ENCOUNTER — PATIENT OUTREACH (OUTPATIENT)
Dept: ADMINISTRATIVE | Facility: HOSPITAL | Age: 37
End: 2022-07-07
Payer: COMMERCIAL

## 2022-07-07 NOTE — PROGRESS NOTES
Cervical Gap BR REPORT: Spoke with pt in regards to overdue pap smear. Offered to schedule pap smear pt accepted appointment in 08/29/2022 at [facility] with Dr. Smith at 10:30 am.      Immunizations Updated  Care Everywhere Updated

## 2022-08-29 ENCOUNTER — OFFICE VISIT (OUTPATIENT)
Dept: OBSTETRICS AND GYNECOLOGY | Facility: CLINIC | Age: 37
End: 2022-08-29
Payer: COMMERCIAL

## 2022-08-29 VITALS
BODY MASS INDEX: 41.22 KG/M2 | HEIGHT: 65 IN | SYSTOLIC BLOOD PRESSURE: 128 MMHG | DIASTOLIC BLOOD PRESSURE: 84 MMHG | WEIGHT: 247.38 LBS

## 2022-08-29 DIAGNOSIS — Z01.419 ENCOUNTER FOR GYNECOLOGICAL EXAMINATION WITHOUT ABNORMAL FINDING: Primary | ICD-10-CM

## 2022-08-29 PROCEDURE — 99395 PR PREVENTIVE VISIT,EST,18-39: ICD-10-PCS | Mod: S$GLB,,, | Performed by: OBSTETRICS & GYNECOLOGY

## 2022-08-29 PROCEDURE — 3008F PR BODY MASS INDEX (BMI) DOCUMENTED: ICD-10-PCS | Mod: CPTII,S$GLB,, | Performed by: OBSTETRICS & GYNECOLOGY

## 2022-08-29 PROCEDURE — 1159F MED LIST DOCD IN RCRD: CPT | Mod: CPTII,S$GLB,, | Performed by: OBSTETRICS & GYNECOLOGY

## 2022-08-29 PROCEDURE — 1159F PR MEDICATION LIST DOCUMENTED IN MEDICAL RECORD: ICD-10-PCS | Mod: CPTII,S$GLB,, | Performed by: OBSTETRICS & GYNECOLOGY

## 2022-08-29 PROCEDURE — 3008F BODY MASS INDEX DOCD: CPT | Mod: CPTII,S$GLB,, | Performed by: OBSTETRICS & GYNECOLOGY

## 2022-08-29 PROCEDURE — 88141 PR  CYTOPATH CERV/VAG INTERPRET: ICD-10-PCS | Mod: ,,, | Performed by: PATHOLOGY

## 2022-08-29 PROCEDURE — 3079F PR MOST RECENT DIASTOLIC BLOOD PRESSURE 80-89 MM HG: ICD-10-PCS | Mod: CPTII,S$GLB,, | Performed by: OBSTETRICS & GYNECOLOGY

## 2022-08-29 PROCEDURE — 99395 PREV VISIT EST AGE 18-39: CPT | Mod: S$GLB,,, | Performed by: OBSTETRICS & GYNECOLOGY

## 2022-08-29 PROCEDURE — 99999 PR PBB SHADOW E&M-EST. PATIENT-LVL III: CPT | Mod: PBBFAC,,, | Performed by: OBSTETRICS & GYNECOLOGY

## 2022-08-29 PROCEDURE — 3074F PR MOST RECENT SYSTOLIC BLOOD PRESSURE < 130 MM HG: ICD-10-PCS | Mod: CPTII,S$GLB,, | Performed by: OBSTETRICS & GYNECOLOGY

## 2022-08-29 PROCEDURE — 87624 HPV HI-RISK TYP POOLED RSLT: CPT | Performed by: OBSTETRICS & GYNECOLOGY

## 2022-08-29 PROCEDURE — 88141 CYTOPATH C/V INTERPRET: CPT | Mod: ,,, | Performed by: PATHOLOGY

## 2022-08-29 PROCEDURE — 88175 CYTOPATH C/V AUTO FLUID REDO: CPT | Performed by: PATHOLOGY

## 2022-08-29 PROCEDURE — 3074F SYST BP LT 130 MM HG: CPT | Mod: CPTII,S$GLB,, | Performed by: OBSTETRICS & GYNECOLOGY

## 2022-08-29 PROCEDURE — 99999 PR PBB SHADOW E&M-EST. PATIENT-LVL III: ICD-10-PCS | Mod: PBBFAC,,, | Performed by: OBSTETRICS & GYNECOLOGY

## 2022-08-29 PROCEDURE — 3079F DIAST BP 80-89 MM HG: CPT | Mod: CPTII,S$GLB,, | Performed by: OBSTETRICS & GYNECOLOGY

## 2022-08-29 NOTE — PROGRESS NOTES
CC: Well woman exam    Malgorzata Yoder is a 37 y.o. female  presents for a well woman exam.  LMP: Patient's last menstrual period was 2022 (approximate)..  No issues, problems, or complaints.    Past Medical History:   Diagnosis Date    Asthma     childhood    Cervical cerclage suture present in second trimester 9/10/2019    12:00. Dominique cerclage    H/O incompetent cervix, currently pregnant, second trimester 9/10/2019    History of cerclage, currently pregnant 2019. Late prenatal care at 4 months, incidental finding of membranes in vaginal vault, cerclage placed per MFM - report requested    History of  delivery, currently pregnant 2019 failed  Cytotec induction of labor at 37 weeks secondary to  severe preeclampsia - [facility] -Dr. Cass Haywood   C/S -(uterine incision not documented) secondary to severe preeclampsia and recurrent late deceleration with SGA baby 4 lb 15 oz.  Dense fibrotic scar tissue was noted during surgery    History of ovarian cyst     Hypertension     Hypokalemia 2014    Paresthesia and pain of both upper extremities 2019     Past Surgical History:   Procedure Laterality Date    CARPAL TUNNEL RELEASE Bilateral     CERVICAL CERCLAGE N/A 9/10/2019    Procedure: CERCLAGE, CERVIX;  Surgeon: Vandana Smith MD;  Location: [facility] OR;  Service: OB/GYN;  Laterality: N/A;     SECTION       SECTION N/A 2020    Procedure:  SECTION;  Surgeon: Vandana Smith MD;  Location: [facility] L&D;  Service: OB/GYN;  Laterality: N/A;    MOUTH SURGERY      OOPHORECTOMY Right      Social History     Socioeconomic History    Marital status: Single    Number of children: 1   Occupational History    Occupation: [employer]     Employer: [employer]   Tobacco Use    Smoking status: Never    Smokeless tobacco: Never   Substance and Sexual Activity    Alcohol use: No    Drug use: No    Sexual activity: Yes      Partners: Male     Birth control/protection: None     Social Determinants of Health     Financial Resource Strain: High Risk    Difficulty of Paying Living Expenses: Hard   Food Insecurity: Food Insecurity Present    Worried About Running Out of Food in the Last Year: Often true    Ran Out of Food in the Last Year: Often true   Transportation Needs: No Transportation Needs    Lack of Transportation (Medical): No    Lack of Transportation (Non-Medical): No   Physical Activity: Insufficiently Active    Days of Exercise per Week: 1 day    Minutes of Exercise per Session: 10 min   Stress: Stress Concern Present    Feeling of Stress : Very much   Social Connections: Unknown    Frequency of Communication with Friends and Family: More than three times a week    Frequency of Social Gatherings with Friends and Family: Never    Active Member of Clubs or Organizations: No    Attends Club or Organization Meetings: Never    Marital Status: Never    Housing Stability: High Risk    Unable to Pay for Housing in the Last Year: Yes    Number of Places Lived in the Last Year: 1    Unstable Housing in the Last Year: No     Family History   Problem Relation Age of Onset    Aneurysm Mother     Cancer Maternal Aunt         brain tumor    Breast cancer Maternal Grandmother     Colon cancer Neg Hx     Ovarian cancer Neg Hx     Uterine cancer Neg Hx     Cervical cancer Neg Hx      OB History          3    Para   2    Term   1       1    AB   1    Living   2         SAB   1    IAB        Ectopic        Multiple   0    Live Births   1                 Current Outpatient Medications:     amLODIPine (NORVASC) 5 MG tablet, Take 1 tablet (5 mg total) by mouth once daily., Disp: 90 tablet, Rfl: 1    labetaloL (NORMODYNE) 200 MG tablet, TAKE 1 TABLET(200 MG) BY MOUTH EVERY 12 HOURS, Disp: 180 tablet, Rfl: 1    potassium chloride SA (K-DUR,KLOR-CON) 20 MEQ tablet, TAKE 1 TABLET(20 MEQ) BY MOUTH TWICE DAILY (Patient not taking:  "Reported on 8/29/2022), Disp: 180 tablet, Rfl: 0    GYNECOLOGY HISTORY:  No abnormal pap    DATA REVIEWED:  Last pap: 2019 Results: normal    /84   Ht 5' 5" (1.651 m)   Wt 112.2 kg (247 lb 5.7 oz)   LMP 08/03/2022 (Approximate)   BMI 41.16 kg/m²     ROS:  GENERAL: Denies weight gain or weight loss. Feeling well overall.   SKIN: Denies rash or lesions.   HEAD: Denies head injury or headache.   NODES: Denies enlarged lymph nodes.   CHEST: Denies chest pain or shortness of breath.   CARDIOVASCULAR: Denies palpitations or left sided chest pain.   ABDOMEN: No abdominal pain, constipation, diarrhea, nausea, vomiting or rectal bleeding.   URINARY: No frequency, dysuria, hematuria, or burning on urination.  REPRODUCTIVE: See HPI.   BREASTS: The patient denies pain, lumps, or nipple discharge.   HEMATOLOGIC: No easy bruisability or excessive bleeding.   MUSCULOSKELETAL: Denies joint pain or swelling.   NEUROLOGIC: Denies syncope or weakness.   PSYCHIATRIC: Denies depression, anxiety or mood swings.    PHYSICAL EXAM:    APPEARANCE: Well nourished, well developed, in no acute distress.  AFFECT: WNL, alert and oriented x 3  SKIN: No acne or hirsutism  NECK: Neck symmetric without masses or thyromegaly  NODES: No inguinal, cervical, axillary, or femoral lymph node enlargement  CHEST: Good respiratory effect  ABDOMEN: Soft.  No tenderness or masses.  No hepatosplenomegaly.  No hernias.  BREASTS: Symmetrical, no skin changes or visible lesions.  No palpable masses, nipple discharge bilaterally.  PELVIC: Normal external genitalia without lesions.  Normal hair distribution.  Adequate perineal body, normal urethral meatus.  Vagina moist and well rugated without lesions or discharge.  Cervix pink, without lesions, discharge or tenderness.  No significant cystocele or rectocele.  Bimanual exam shows uterus to be normal size, regular, mobile and nontender.  Adnexa without masses or tenderness.    EXTREMITIES: No " edema.    Encounter for gynecological examination without abnormal finding  -     Liquid-Based Pap Smear, Screening  -     HPV High Risk Genotypes, PCR    Patient was counseled today on A.C.S. Pap guidelines (Q3) and recommendations for yearly pelvic exams, yearly mammograms starting age 40, and clinical breast exams; to see her PCP for other health maintenance.

## 2022-09-02 LAB
HPV HR 12 DNA SPEC QL NAA+PROBE: POSITIVE
HPV16 AG SPEC QL: NEGATIVE
HPV18 DNA SPEC QL NAA+PROBE: NEGATIVE

## 2022-09-06 ENCOUNTER — PATIENT MESSAGE (OUTPATIENT)
Dept: OBSTETRICS AND GYNECOLOGY | Facility: CLINIC | Age: 37
End: 2022-09-06
Payer: COMMERCIAL

## 2022-09-06 ENCOUNTER — PATIENT MESSAGE (OUTPATIENT)
Dept: OBSTETRICS AND GYNECOLOGY | Facility: HOSPITAL | Age: 37
End: 2022-09-06
Payer: COMMERCIAL

## 2022-09-06 LAB
FINAL PATHOLOGIC DIAGNOSIS: ABNORMAL
Lab: ABNORMAL

## 2022-12-02 NOTE — HOSPITAL COURSE
Mom (Nicole) concerned that her daughter is still c/o E/ear pain and Runny nose. This started 8 days ago (11/26/22) and she ws seen @ the [facility] and DX w/a R/Ear Infection. Patient was given Amoxicillin for 10 day's, and her last day will be Saturday. Please advise Mom, since her daughter's symptoms have not resolved.     Pt presents for prophylactic cerclage. Pt underwent Dominique cerclage without complications. She was discharged home in stable condition

## 2022-12-13 ENCOUNTER — OFFICE VISIT (OUTPATIENT)
Dept: INTERNAL MEDICINE | Facility: CLINIC | Age: 37
End: 2022-12-13
Payer: COMMERCIAL

## 2022-12-13 ENCOUNTER — LAB VISIT (OUTPATIENT)
Dept: LAB | Facility: HOSPITAL | Age: 37
End: 2022-12-13
Attending: FAMILY MEDICINE
Payer: COMMERCIAL

## 2022-12-13 VITALS
WEIGHT: 248.69 LBS | TEMPERATURE: 98 F | HEIGHT: 65 IN | HEART RATE: 81 BPM | OXYGEN SATURATION: 100 % | SYSTOLIC BLOOD PRESSURE: 146 MMHG | DIASTOLIC BLOOD PRESSURE: 98 MMHG | BODY MASS INDEX: 41.43 KG/M2 | RESPIRATION RATE: 20 BRPM

## 2022-12-13 DIAGNOSIS — I10 ESSENTIAL HYPERTENSION: Primary | ICD-10-CM

## 2022-12-13 DIAGNOSIS — E66.01 MORBID OBESITY WITH BMI OF 40.0-44.9, ADULT: ICD-10-CM

## 2022-12-13 DIAGNOSIS — R87.610 ATYPICAL SQUAMOUS CELL CHANGES OF UNDETERMINED SIGNIFICANCE (ASCUS) ON CERVICAL CYTOLOGY WITH POSITIVE HIGH RISK HUMAN PAPILLOMA VIRUS (HPV): ICD-10-CM

## 2022-12-13 DIAGNOSIS — R87.810 ATYPICAL SQUAMOUS CELL CHANGES OF UNDETERMINED SIGNIFICANCE (ASCUS) ON CERVICAL CYTOLOGY WITH POSITIVE HIGH RISK HUMAN PAPILLOMA VIRUS (HPV): ICD-10-CM

## 2022-12-13 DIAGNOSIS — E87.6 HYPOKALEMIA: ICD-10-CM

## 2022-12-13 DIAGNOSIS — I10 ESSENTIAL HYPERTENSION: ICD-10-CM

## 2022-12-13 LAB
ALBUMIN SERPL BCP-MCNC: 3.7 G/DL (ref 3.5–5.2)
ALP SERPL-CCNC: 62 U/L (ref 55–135)
ALT SERPL W/O P-5'-P-CCNC: 20 U/L (ref 10–44)
ANION GAP SERPL CALC-SCNC: 6 MMOL/L (ref 8–16)
AST SERPL-CCNC: 18 U/L (ref 10–40)
BILIRUB SERPL-MCNC: 0.3 MG/DL (ref 0.1–1)
BUN SERPL-MCNC: 7 MG/DL (ref 6–20)
CALCIUM SERPL-MCNC: 8.8 MG/DL (ref 8.7–10.5)
CHLORIDE SERPL-SCNC: 102 MMOL/L (ref 95–110)
CO2 SERPL-SCNC: 31 MMOL/L (ref 23–29)
CREAT SERPL-MCNC: 0.8 MG/DL (ref 0.5–1.4)
EST. GFR  (NO RACE VARIABLE): >60 ML/MIN/1.73 M^2
ESTIMATED AVG GLUCOSE: 97 MG/DL (ref 68–131)
GLUCOSE SERPL-MCNC: 92 MG/DL (ref 70–110)
HBA1C MFR BLD: 5 % (ref 4–5.6)
POTASSIUM SERPL-SCNC: 3.2 MMOL/L (ref 3.5–5.1)
PROT SERPL-MCNC: 7.7 G/DL (ref 6–8.4)
SODIUM SERPL-SCNC: 139 MMOL/L (ref 136–145)
TSH SERPL DL<=0.005 MIU/L-ACNC: 1.66 UIU/ML (ref 0.4–4)

## 2022-12-13 PROCEDURE — 3008F PR BODY MASS INDEX (BMI) DOCUMENTED: ICD-10-PCS | Mod: CPTII,S$GLB,, | Performed by: FAMILY MEDICINE

## 2022-12-13 PROCEDURE — 80053 COMPREHEN METABOLIC PANEL: CPT | Performed by: FAMILY MEDICINE

## 2022-12-13 PROCEDURE — 99999 PR PBB SHADOW E&M-EST. PATIENT-LVL IV: ICD-10-PCS | Mod: PBBFAC,,, | Performed by: FAMILY MEDICINE

## 2022-12-13 PROCEDURE — 99214 PR OFFICE/OUTPT VISIT, EST, LEVL IV, 30-39 MIN: ICD-10-PCS | Mod: S$GLB,,, | Performed by: FAMILY MEDICINE

## 2022-12-13 PROCEDURE — 84443 ASSAY THYROID STIM HORMONE: CPT | Performed by: FAMILY MEDICINE

## 2022-12-13 PROCEDURE — 83036 HEMOGLOBIN GLYCOSYLATED A1C: CPT | Performed by: FAMILY MEDICINE

## 2022-12-13 PROCEDURE — 1159F MED LIST DOCD IN RCRD: CPT | Mod: CPTII,S$GLB,, | Performed by: FAMILY MEDICINE

## 2022-12-13 PROCEDURE — 3080F PR MOST RECENT DIASTOLIC BLOOD PRESSURE >= 90 MM HG: ICD-10-PCS | Mod: CPTII,S$GLB,, | Performed by: FAMILY MEDICINE

## 2022-12-13 PROCEDURE — 1159F PR MEDICATION LIST DOCUMENTED IN MEDICAL RECORD: ICD-10-PCS | Mod: CPTII,S$GLB,, | Performed by: FAMILY MEDICINE

## 2022-12-13 PROCEDURE — 99999 PR PBB SHADOW E&M-EST. PATIENT-LVL IV: CPT | Mod: PBBFAC,,, | Performed by: FAMILY MEDICINE

## 2022-12-13 PROCEDURE — 3080F DIAST BP >= 90 MM HG: CPT | Mod: CPTII,S$GLB,, | Performed by: FAMILY MEDICINE

## 2022-12-13 PROCEDURE — 3008F BODY MASS INDEX DOCD: CPT | Mod: CPTII,S$GLB,, | Performed by: FAMILY MEDICINE

## 2022-12-13 PROCEDURE — 3077F PR MOST RECENT SYSTOLIC BLOOD PRESSURE >= 140 MM HG: ICD-10-PCS | Mod: CPTII,S$GLB,, | Performed by: FAMILY MEDICINE

## 2022-12-13 PROCEDURE — 99214 OFFICE O/P EST MOD 30 MIN: CPT | Mod: S$GLB,,, | Performed by: FAMILY MEDICINE

## 2022-12-13 PROCEDURE — 3077F SYST BP >= 140 MM HG: CPT | Mod: CPTII,S$GLB,, | Performed by: FAMILY MEDICINE

## 2022-12-13 PROCEDURE — 36415 COLL VENOUS BLD VENIPUNCTURE: CPT | Performed by: FAMILY MEDICINE

## 2022-12-13 RX ORDER — POTASSIUM CHLORIDE 750 MG/1
10 TABLET, EXTENDED RELEASE ORAL DAILY
Qty: 30 TABLET | Refills: 1 | Status: SHIPPED | OUTPATIENT
Start: 2022-12-13 | End: 2023-02-14

## 2022-12-13 RX ORDER — LABETALOL 200 MG/1
TABLET, FILM COATED ORAL
Qty: 180 TABLET | Refills: 1 | Status: SHIPPED | OUTPATIENT
Start: 2022-12-13 | End: 2023-03-14 | Stop reason: SDUPTHER

## 2022-12-13 RX ORDER — AMLODIPINE BESYLATE 10 MG/1
10 TABLET ORAL DAILY
Qty: 30 TABLET | Refills: 11 | Status: SHIPPED | OUTPATIENT
Start: 2022-12-13 | End: 2023-01-10 | Stop reason: SDUPTHER

## 2022-12-13 NOTE — PROGRESS NOTES
Subjective:       Patient ID: Malgorzata Yoder is a 37 y.o. female.    Chief Complaint: Annual Exam    37-year-old  female patient with Patient Active Problem List:     Essential hypertension     Hypokalemia     Carpal tunnel syndrome on both sides     Morbid obesity with BMI of 40.0-44.9, adult    Reports that she has been taking her blood pressure medications regularly but usually takes it and mid afternoon, denies any headache or chest pain or difficulty breathing  Patient was told about abnormal Pap smear but does not have any follow-up for colposcopy and would like to establish care with new gynecologist as her previous gynecologist left  Occasionally has lower abdominal discomfort with sharp shooting pains but not regularly and denies any discomfort with urination trouble with bowel movements  Currently not taking potassium supplements lately    Hypertension  This is a chronic problem. The current episode started more than 1 year ago. The problem has been waxing and waning since onset. The problem is controlled. Associated symptoms include anxiety, blurred vision and malaise/fatigue. Pertinent negatives include no chest pain, headaches, neck pain, orthopnea, palpitations, peripheral edema, PND, shortness of breath or sweats. There are no associated agents to hypertension. There are no known risk factors for coronary artery disease. Past treatments include nothing. The current treatment provides significant improvement. Compliance problems include exercise and psychosocial issues.    Review of Systems   Constitutional:  Positive for malaise/fatigue. Negative for fatigue.   Eyes:  Positive for blurred vision. Negative for visual disturbance.   Respiratory:  Negative for shortness of breath.    Cardiovascular:  Negative for chest pain, palpitations, orthopnea, leg swelling and PND.   Gastrointestinal:  Negative for abdominal pain, nausea and vomiting.   Genitourinary:  Negative for menstrual  "problem and vaginal discharge.   Musculoskeletal:  Negative for myalgias and neck pain.   Skin:  Negative for rash.   Neurological:  Negative for light-headedness and headaches.   Psychiatric/Behavioral:  Negative for sleep disturbance.        BP (!) 146/98 (BP Location: Right arm, Patient Position: Sitting, BP Method: Large (Manual))   Pulse 81   Temp 97.8 °F (36.6 °C) (Tympanic)   Resp 20   Ht 5' 5" (1.651 m)   Wt 112.8 kg (248 lb 10.9 oz)   LMP 11/26/2022   SpO2 100%   BMI 41.38 kg/m²   Objective:      Physical Exam  Constitutional:       Appearance: She is well-developed.   HENT:      Head: Normocephalic and atraumatic.   Cardiovascular:      Rate and Rhythm: Normal rate and regular rhythm.      Heart sounds: Normal heart sounds. No murmur heard.  Pulmonary:      Effort: Pulmonary effort is normal.      Breath sounds: Normal breath sounds. No wheezing.   Abdominal:      General: Bowel sounds are normal.      Palpations: Abdomen is soft.      Tenderness: There is no abdominal tenderness. There is no guarding or rebound.   Skin:     General: Skin is warm and dry.      Findings: No rash.   Neurological:      Mental Status: She is alert and oriented to person, place, and time.   Psychiatric:         Mood and Affect: Mood normal.         Assessment/Plan:   1. Essential hypertension  - amLODIPine (NORVASC) 10 MG tablet; Take 1 tablet (10 mg total) by mouth once daily.  Dispense: 30 tablet; Refill: 11  - Comprehensive Metabolic Panel; Future  - TSH; Future  - labetaloL (NORMODYNE) 200 MG tablet; TAKE 1 TABLET(200 MG) BY MOUTH EVERY 12 HOURS  Dispense: 180 tablet; Refill: 1  Blood pressure is elevated today will consider increasing amlodipine from 5-10 mg daily and continue labetalol 200 mg twice daily  Refills given today  Return to the clinic in 2 weeks as a nurse visit for blood pressure check    2. Hypokalemia  - Comprehensive Metabolic Panel; Future  Currently not taking any potassium supplements    3. " Morbid obesity with BMI of 40.0-44.9, adult  - Hemoglobin A1C; Future  Lifestyle modifications recommended to lose weight with BMI 41  Noted weight gain of 10 lb  since last visit    4. Atypical squamous cell changes of undetermined significance (ASCUS) on cervical cytology with positive high risk human papilloma virus (HPV)  - Ambulatory referral/consult to Obstetrics / Gynecology; Future     Will refer to gynecologist for further evaluation    Refuses further vaccinations

## 2022-12-27 ENCOUNTER — CLINICAL SUPPORT (OUTPATIENT)
Dept: INTERNAL MEDICINE | Facility: CLINIC | Age: 37
End: 2022-12-27
Payer: COMMERCIAL

## 2022-12-27 ENCOUNTER — TELEPHONE (OUTPATIENT)
Dept: INTERNAL MEDICINE | Facility: CLINIC | Age: 37
End: 2022-12-27

## 2022-12-27 VITALS — DIASTOLIC BLOOD PRESSURE: 90 MMHG | SYSTOLIC BLOOD PRESSURE: 150 MMHG

## 2022-12-27 DIAGNOSIS — I10 ESSENTIAL HYPERTENSION: Primary | ICD-10-CM

## 2022-12-27 PROCEDURE — 99999 PR PBB SHADOW E&M-EST. PATIENT-LVL II: ICD-10-PCS | Mod: PBBFAC,,,

## 2022-12-27 PROCEDURE — 99999 PR PBB SHADOW E&M-EST. PATIENT-LVL II: CPT | Mod: PBBFAC,,,

## 2022-12-27 RX ORDER — CHLORTHALIDONE 25 MG/1
25 TABLET ORAL DAILY
Qty: 30 TABLET | Refills: 1 | Status: SHIPPED | OUTPATIENT
Start: 2022-12-27 | End: 2023-02-14

## 2022-12-27 NOTE — PROGRESS NOTES
Patient in office for blood pressure check. She works nights and had just left work. She stated that she has been taking her medications as directed. Her first blood pressure was: 160/90. After resting quietly in room for 10 minutes, her blood pressure was: 150/90. Notified Dr. Adbul.

## 2022-12-27 NOTE — PROGRESS NOTES
Will get started on Chlorthalidone 25 mg as BP is still uncontrolled     RTC in nurse visit for BP check

## 2022-12-27 NOTE — TELEPHONE ENCOUNTER
Spoke with patient and informed her per dr. Abdul of addition of chlorthalidone 25 mg to her medication regimen due to uncontrolled high blood pressure. Also scheduled her another nurse visit in two weeks to re-check blood pressure. Patient verbalized understanding.

## 2023-01-10 ENCOUNTER — CLINICAL SUPPORT (OUTPATIENT)
Dept: INTERNAL MEDICINE | Facility: CLINIC | Age: 38
End: 2023-01-10
Payer: COMMERCIAL

## 2023-01-10 VITALS — DIASTOLIC BLOOD PRESSURE: 82 MMHG | SYSTOLIC BLOOD PRESSURE: 130 MMHG

## 2023-01-10 DIAGNOSIS — I10 ESSENTIAL HYPERTENSION: ICD-10-CM

## 2023-01-10 PROCEDURE — 99999 PR PBB SHADOW E&M-EST. PATIENT-LVL II: CPT | Mod: PBBFAC,,,

## 2023-01-10 PROCEDURE — 99999 PR PBB SHADOW E&M-EST. PATIENT-LVL II: ICD-10-PCS | Mod: PBBFAC,,,

## 2023-01-10 RX ORDER — AMLODIPINE BESYLATE 10 MG/1
10 TABLET ORAL DAILY
Qty: 90 TABLET | Refills: 1 | Status: SHIPPED | OUTPATIENT
Start: 2023-01-10 | End: 2023-02-14

## 2023-01-10 NOTE — TELEPHONE ENCOUNTER
Unable to retrieve patient chart and identify care gaps.  [facility] Embedded Care Gaps. Reference number: [id number]. 1/10/2023   8:55:04 AM CST

## 2023-01-10 NOTE — TELEPHONE ENCOUNTER
Patient in office for a blood pressure check per Dr. Abdul. She stated that she has been taking all her medications as prescribed. She ran out of her amlodipine yesterday and needs refills, so she didn't take that one today. Her blood pressure was: 130/82. Notified DR. Abdul.

## 2023-01-23 ENCOUNTER — PROCEDURE VISIT (OUTPATIENT)
Dept: OBSTETRICS AND GYNECOLOGY | Facility: CLINIC | Age: 38
End: 2023-01-23
Payer: COMMERCIAL

## 2023-01-23 VITALS
HEIGHT: 65 IN | WEIGHT: 245.38 LBS | SYSTOLIC BLOOD PRESSURE: 124 MMHG | DIASTOLIC BLOOD PRESSURE: 74 MMHG | BODY MASS INDEX: 40.88 KG/M2

## 2023-01-23 DIAGNOSIS — R87.810 ASCUS WITH POSITIVE HIGH RISK HPV CERVICAL: Primary | ICD-10-CM

## 2023-01-23 DIAGNOSIS — R87.610 ASCUS WITH POSITIVE HIGH RISK HPV CERVICAL: Primary | ICD-10-CM

## 2023-01-23 PROCEDURE — 88305 TISSUE EXAM BY PATHOLOGIST: CPT | Performed by: PATHOLOGY

## 2023-01-23 PROCEDURE — 81025 PR  URINE PREGNANCY TEST: ICD-10-PCS | Mod: S$GLB,,, | Performed by: OBSTETRICS & GYNECOLOGY

## 2023-01-23 PROCEDURE — 81025 URINE PREGNANCY TEST: CPT | Mod: S$GLB,,, | Performed by: OBSTETRICS & GYNECOLOGY

## 2023-01-23 PROCEDURE — 57455 PR COLPOSCOPY,CERVIX W/ADJ VAGINA,W/BX: ICD-10-PCS | Mod: S$GLB,,, | Performed by: OBSTETRICS & GYNECOLOGY

## 2023-01-23 PROCEDURE — 57455 BIOPSY OF CERVIX W/SCOPE: CPT | Mod: S$GLB,,, | Performed by: OBSTETRICS & GYNECOLOGY

## 2023-01-23 PROCEDURE — 88305 TISSUE EXAM BY PATHOLOGIST: ICD-10-PCS | Mod: 26,,, | Performed by: PATHOLOGY

## 2023-01-23 PROCEDURE — 88305 TISSUE EXAM BY PATHOLOGIST: CPT | Mod: 26,,, | Performed by: PATHOLOGY

## 2023-01-23 NOTE — PROCEDURES
Colposcopy-Today    Date/Time: 2023 4:30 PM  Performed by: Delvin Fernández MD  Authorized by: Delvin Fernández MD     Consent Done?:  Yes (Written)  Timeout:Immediately prior to procedure a time out was called to verify the correct patient, procedure, equipment, support staff and site/side marked as required  Assistants?: No      Colposcopy Site:  Cervix  Position:  Supine  Acrowhite Lesion? Yes    Atypical Vessels: No    Transformation Zone Adequate?: Yes    Biopsy?: Yes         Location:  Cervix ((3 00))  ECC Performed?: No    LEEP Performed?: No    Estimated blood loss (cc):  1   Patient tolerated the procedure well with no immediate complications.   Post-operative instructions were provided for the patient.   Patient was discharged and will follow up if any complications occur     COLPOSCOPY:    Malgorzata Yoder is a 37 y.o. female   presents for colposcopy.  Patient's last menstrual period was 2022 (exact date)..  Her most recent pap smear shows ASCUS with POSITIVE high risk HPV.      The abnormal test findings were discussed, as well as HPV infection, need for colposcopy and possible biopsies to determine the plan of care, treatments available, the minimal risk of bleeding and infection with colposcopy, and alternatives to colposcopy.  Pt voiced understanding and desires to proceed.      UPT is negative    COLPOSCOPY EXAM:   TIME OUT PERFORMED.     No gross vulvovaginal or cervix lesions noted.  On colpo: no mosaicism, no punctation, no abnormal vasculature, and acetowhite lesion(s) noted at 3 o'clock    Biopsy was taken at 3 o'clock.  ECC was not performed.  SCJ was entirely visualized.    Hemostasis was adequate with application of Monsel's solution.  The speculum was removed.  The patient did tolerate the procedure well.    All collected specimens sent to pathology for histologic analysis.    Post-colposcopy counseling:  The patient was instructed to manage post-colposcopy cramping  with NSAIDs or Tylenol, or with a prescription per the medication card.  Avoid intercourse, douching, or tampons in the vagina for at least 2-3 days.  Expect a clumpy blackish discharge due to Monsel's solution application for several days.  Report heavy bleeding, worsening pain or pain that does not respond to above medications, or foul-smelling vaginal discharge. HPV vaccine recommended according to FDA age guidelines.  Importance of follow-up stressed.      Follow up based on colposcopy results.  Impression:  TRAVON 1.  If confirmed by pathology results, recommend follow-up pap in 12 months.

## 2023-01-30 LAB
FINAL PATHOLOGIC DIAGNOSIS: NORMAL
Lab: NORMAL

## 2023-02-14 ENCOUNTER — OFFICE VISIT (OUTPATIENT)
Dept: OBSTETRICS AND GYNECOLOGY | Facility: CLINIC | Age: 38
End: 2023-02-14
Payer: COMMERCIAL

## 2023-02-14 VITALS
SYSTOLIC BLOOD PRESSURE: 122 MMHG | BODY MASS INDEX: 40.22 KG/M2 | WEIGHT: 241.38 LBS | DIASTOLIC BLOOD PRESSURE: 78 MMHG | HEIGHT: 65 IN

## 2023-02-14 DIAGNOSIS — O09.291 HISTORY OF INCOMPETENT CERVIX, CURRENTLY PREGNANT IN FIRST TRIMESTER: ICD-10-CM

## 2023-02-14 DIAGNOSIS — O09.521 MULTIGRAVIDA OF ADVANCED MATERNAL AGE IN FIRST TRIMESTER: ICD-10-CM

## 2023-02-14 DIAGNOSIS — O99.211 OBESITY AFFECTING PREGNANCY IN FIRST TRIMESTER: ICD-10-CM

## 2023-02-14 DIAGNOSIS — Z32.01 POSITIVE PREGNANCY TEST: Primary | ICD-10-CM

## 2023-02-14 DIAGNOSIS — O09.299 HISTORY OF PRE-ECLAMPSIA IN PRIOR PREGNANCY, CURRENTLY PREGNANT: ICD-10-CM

## 2023-02-14 DIAGNOSIS — O34.219 HISTORY OF CESAREAN DELIVERY, CURRENTLY PREGNANT: ICD-10-CM

## 2023-02-14 DIAGNOSIS — O10.919 CHRONIC HYPERTENSION AFFECTING PREGNANCY: ICD-10-CM

## 2023-02-14 LAB
B-HCG UR QL: POSITIVE
CTP QC/QA: YES

## 2023-02-14 PROCEDURE — 1160F PR REVIEW ALL MEDS BY PRESCRIBER/CLIN PHARMACIST DOCUMENTED: ICD-10-PCS | Mod: CPTII,S$GLB,, | Performed by: OBSTETRICS & GYNECOLOGY

## 2023-02-14 PROCEDURE — 99999 PR PBB SHADOW E&M-EST. PATIENT-LVL III: ICD-10-PCS | Mod: PBBFAC,,, | Performed by: OBSTETRICS & GYNECOLOGY

## 2023-02-14 PROCEDURE — 3078F PR MOST RECENT DIASTOLIC BLOOD PRESSURE < 80 MM HG: ICD-10-PCS | Mod: CPTII,S$GLB,, | Performed by: OBSTETRICS & GYNECOLOGY

## 2023-02-14 PROCEDURE — 81025 PR  URINE PREGNANCY TEST: ICD-10-PCS | Mod: S$GLB,,, | Performed by: OBSTETRICS & GYNECOLOGY

## 2023-02-14 PROCEDURE — 1159F MED LIST DOCD IN RCRD: CPT | Mod: CPTII,S$GLB,, | Performed by: OBSTETRICS & GYNECOLOGY

## 2023-02-14 PROCEDURE — 82570 ASSAY OF URINE CREATININE: CPT | Performed by: OBSTETRICS & GYNECOLOGY

## 2023-02-14 PROCEDURE — 3078F DIAST BP <80 MM HG: CPT | Mod: CPTII,S$GLB,, | Performed by: OBSTETRICS & GYNECOLOGY

## 2023-02-14 PROCEDURE — 3008F BODY MASS INDEX DOCD: CPT | Mod: CPTII,S$GLB,, | Performed by: OBSTETRICS & GYNECOLOGY

## 2023-02-14 PROCEDURE — 87086 URINE CULTURE/COLONY COUNT: CPT | Performed by: OBSTETRICS & GYNECOLOGY

## 2023-02-14 PROCEDURE — 99999 PR PBB SHADOW E&M-EST. PATIENT-LVL III: CPT | Mod: PBBFAC,,, | Performed by: OBSTETRICS & GYNECOLOGY

## 2023-02-14 PROCEDURE — 3008F PR BODY MASS INDEX (BMI) DOCUMENTED: ICD-10-PCS | Mod: CPTII,S$GLB,, | Performed by: OBSTETRICS & GYNECOLOGY

## 2023-02-14 PROCEDURE — 1160F RVW MEDS BY RX/DR IN RCRD: CPT | Mod: CPTII,S$GLB,, | Performed by: OBSTETRICS & GYNECOLOGY

## 2023-02-14 PROCEDURE — 3074F SYST BP LT 130 MM HG: CPT | Mod: CPTII,S$GLB,, | Performed by: OBSTETRICS & GYNECOLOGY

## 2023-02-14 PROCEDURE — 1159F PR MEDICATION LIST DOCUMENTED IN MEDICAL RECORD: ICD-10-PCS | Mod: CPTII,S$GLB,, | Performed by: OBSTETRICS & GYNECOLOGY

## 2023-02-14 PROCEDURE — 81025 URINE PREGNANCY TEST: CPT | Mod: S$GLB,,, | Performed by: OBSTETRICS & GYNECOLOGY

## 2023-02-14 PROCEDURE — 99213 PR OFFICE/OUTPT VISIT, EST, LEVL III, 20-29 MIN: ICD-10-PCS | Mod: S$GLB,,, | Performed by: OBSTETRICS & GYNECOLOGY

## 2023-02-14 PROCEDURE — 3074F PR MOST RECENT SYSTOLIC BLOOD PRESSURE < 130 MM HG: ICD-10-PCS | Mod: CPTII,S$GLB,, | Performed by: OBSTETRICS & GYNECOLOGY

## 2023-02-14 PROCEDURE — 99213 OFFICE O/P EST LOW 20 MIN: CPT | Mod: S$GLB,,, | Performed by: OBSTETRICS & GYNECOLOGY

## 2023-02-14 NOTE — PROGRESS NOTES
CHIEF COMPLAINT:   Patient presents with      Possible Pregnancy        HISTORY OF PRESENT ILLNESS  Malgorzata Yoder 37 y.o.  presents for pregnancy risk assessment.   The patient has no complaints today.  No nausea or vomiting. No bleeding or pain.  Pregnancy was not planned but is desired.  Partner is supportive of pregnancy.  Lives at home with her two children.  No pets at home.  Works at [employer].  Denies domestic abuse.  Denies chemical/pesticide/radiation exposure.  OB history:  '12 C/S at 37 WGA (pre-eclampsia, cerlcage)  '20 C/S at at 39 WGA (cerclage)  SAB      LMP: Patient's last menstrual period was 2022 (exact date).  EDC: Estimated Date of Delivery: 10/1/23  EGA: 7w2d       Health Maintenance   Topic Date Due    TETANUS VACCINE  2029    Hepatitis C Screening  Completed    Lipid Panel  Completed       Past Medical History:   Diagnosis Date    Asthma     childhood    Cervical cerclage suture present in second trimester 9/10/2019    12:00. Catina cerclage    H/O incompetent cervix, currently pregnant, second trimester 9/10/2019    History of cerclage, currently pregnant 2019. Late prenatal care at 4 months, incidental finding of membranes in vaginal vault, cerclage placed per MFM - report requested    History of  delivery, currently pregnant 2019 failed  Cytotec induction of labor at 37 weeks secondary to  severe preeclampsia - [facility] -Dr. Cass Haywood   C/S -(uterine incision not documented) secondary to severe preeclampsia and recurrent late deceleration with SGA baby 4 lb 15 oz.  Dense fibrotic scar tissue was noted during surgery    History of ovarian cyst     Hypertension     Hypokalemia 2014    Paresthesia and pain of both upper extremities 2019       Past Surgical History:   Procedure Laterality Date    CARPAL TUNNEL RELEASE Bilateral     CERVICAL CERCLAGE N/A 09/10/2019    Procedure: CERCLAGE, CERVIX;   Surgeon: Vandana Smith MD;  Location: [facility] OR;  Service: OB/GYN;  Laterality: N/A;     SECTION       SECTION N/A 2020    Procedure:  SECTION;  Surgeon: Vandana Smith MD;  Location: [facility] L&D;  Service: OB/GYN;  Laterality: N/A;    MOUTH SURGERY      OOPHORECTOMY Right 2010       Family History   Problem Relation Age of Onset    Aneurysm Mother     Hypertension Mother     Miscarriages / Stillbirths Mother     Cancer Maternal Aunt         brain tumor    Breast cancer Maternal Grandmother     Cancer Maternal Grandmother     Learning disabilities Daughter     Colon cancer Neg Hx     Ovarian cancer Neg Hx     Uterine cancer Neg Hx     Cervical cancer Neg Hx        Social History     Socioeconomic History    Marital status: Single    Number of children: 1   Occupational History    Occupation: [employer]     Employer: [employer]   Tobacco Use    Smoking status: Never    Smokeless tobacco: Never   Substance and Sexual Activity    Alcohol use: No    Drug use: No    Sexual activity: Yes     Partners: Male     Birth control/protection: None     Social Determinants of Health     Financial Resource Strain: High Risk    Difficulty of Paying Living Expenses: Very hard   Food Insecurity: Food Insecurity Present    Worried About Running Out of Food in the Last Year: Often true    Ran Out of Food in the Last Year: Often true   Transportation Needs: No Transportation Needs    Lack of Transportation (Medical): No    Lack of Transportation (Non-Medical): No   Physical Activity: Insufficiently Active    Days of Exercise per Week: 5 days    Minutes of Exercise per Session: 10 min   Stress: Stress Concern Present    Feeling of Stress : Very much   Social Connections: Unknown    Frequency of Communication with Friends and Family: Three times a week    Frequency of Social Gatherings with Friends and Family: Once a week    Active Member of Clubs or Organizations: No    Attends Club or Organization Meetings: Never     Marital Status: Never    Housing Stability: Low Risk     Unable to Pay for Housing in the Last Year: No    Number of Places Lived in the Last Year: 1    Unstable Housing in the Last Year: No       Current Outpatient Medications   Medication Sig Dispense Refill    labetaloL (NORMODYNE) 200 MG tablet TAKE 1 TABLET(200 MG) BY MOUTH EVERY 12 HOURS 180 tablet 1     No current facility-administered medications for this visit.       Review of patient's allergies indicates:  No Known Allergies      PHYSICAL EXAM   Vitals:    02/14/23 1047   BP: 122/78        PAIN SCALE: 0/10 None    PHYSICAL EXAM    ROS:  GENERAL: No fever, chills, fatigability or weight loss.  CV: Denies chest pain  PULM: Denies shortness of breath or wheezing.  ABDOMEN: Appetite fine. No weight loss. Denies diarrhea, abdominal pain, hematemesis or blood in stool.  URINARY: No flank pain, dysuria or hematuria.  REPRODUCTIVE: No abnormal vaginal bleeding.       PE:   APPEARANCE: Well nourished, well developed, in no acute distress  CHEST: Clear to auscultation bilaterally  CV: Regular rate and rhythm  ABDOMEN: Soft. No tenderness or masses. No hepatosplenomegaly. No hernias  PELVIC: Deferred    UPT +    A/P:  Positive pregnancy test  -     CBC Auto Differential; Future; Expected date: 02/14/2023  -     Hepatitis Panel, Acute; Future; Expected date: 02/14/2023  -     HIV 1/2 Ag/Ab (4th Gen); Future; Expected date: 02/14/2023  -     POCT urine pregnancy  -     RPR; Future; Expected date: 02/14/2023  -     Rubella Antibody, IgG; Future; Expected date: 02/14/2023  -     Sickle Cell Screen; Future; Expected date: 02/14/2023  -     Type & Screen; Future  -     US OB/GYN Procedure (Viewpoint); Future  -     Hemoglobin A1C; Future; Expected date: 02/14/2023  -     Comprehensive Metabolic Panel; Future; Expected date: 02/14/2023  -     Urine culture  -      Patient was counseled today on A.C.S. Pap guidelines and recommendations for yearly pelvic exams,  mammograms and monthly self breast exams; to see her PCP for other health maintenance and pregnancy.  Last pap ASCUS HPV + .   -      Patient's medications and medical history reviewed with patient and implications in pregnancy.   -      Pregnancy course discussed and 'AtoZ' book given. Patient was counseled on proper weight gain based on the [place] [employer]'s recommendations based on her pre-pregnancy weight. Discussed foods to avoid in pregnancy (i.e. sushi, fish that are high in mercury, deli meat, and unpasteurized cheeses). Discussed prenatal vitamin options (i.e. stool softener, DHA). Discussed potential medical problems in pregnancy.  -     Discussed risk of Toxoplasmosis transmission from pets and reviewed risk reduction techniques.  -     Pt was counseled on travel recommendations and on risks of Zika virus exposure.  Current [facility] Zika advisories and prevention techniques were reviewed with pt.  Pt denies any recent international travel and does not plan travel during pregnancy.  Pt reports that partner does not plan travel either.  -     Oriented to practice including CNM collaboration.   -     Follow-up initial OB, with labs and u/s.     Chronic hypertension affecting pregnancy  -     Protein/Creatinine Ratio, Urine  -     EKG 12-lead; Future  -     Stop Norvasc, Hygroten, and K+.  Continue with Labetalol and monitor BP.  Will adjust as needed.  -     Daily ASA 81 mg from 16-36 WGA.  -     Pt counseled on risks and recommendations.    History of  delivery, currently pregnant  -     Prior C/S x 2.  Desires repeat.  -     Pt is done with fertility and would like tubal sterilization at time of delivery.  Will discuss again at 24 WGA.    Obesity affecting pregnancy in first trimester  -     Pt was counseled on exercise in pregnancy and weight gain recommendations.  -     Pt counseled on risks and recommendations.    History of pre-eclampsia in prior pregnancy, currently pregnant  -     Pt  counseled on risks and recommendations.    -     Daily ASA.    History of incompetent cervix, currently pregnant in first trimester  -     Prior cerclage x 2.  Pt counseled on risks and options.  Pt desires HIC at 14-16 WGA.    Multigravida of advanced maternal age in first trimester  -     Discused increased risks with AMA status.   -     Chromosomal abnormality risk discussed and available testing offered - pt will think about it.      Follow up in about 1 week (around 2/21/2023) for intial OB with CNM.

## 2023-02-15 LAB
CREAT UR-MCNC: 95 MG/DL (ref 15–325)
PROT UR-MCNC: 10 MG/DL (ref 0–15)
PROT/CREAT UR: 0.11 MG/G{CREAT} (ref 0–0.2)

## 2023-02-16 LAB — BACTERIA UR CULT: NO GROWTH

## 2023-02-23 ENCOUNTER — TELEPHONE (OUTPATIENT)
Dept: OBSTETRICS AND GYNECOLOGY | Facility: HOSPITAL | Age: 38
End: 2023-02-23
Payer: COMMERCIAL

## 2023-02-23 ENCOUNTER — OFFICE VISIT (OUTPATIENT)
Dept: OBSTETRICS AND GYNECOLOGY | Facility: CLINIC | Age: 38
End: 2023-02-23
Payer: COMMERCIAL

## 2023-02-23 ENCOUNTER — LAB VISIT (OUTPATIENT)
Dept: LAB | Facility: HOSPITAL | Age: 38
End: 2023-02-23
Attending: OBSTETRICS & GYNECOLOGY
Payer: COMMERCIAL

## 2023-02-23 ENCOUNTER — PROCEDURE VISIT (OUTPATIENT)
Dept: OBSTETRICS AND GYNECOLOGY | Facility: CLINIC | Age: 38
End: 2023-02-23
Payer: COMMERCIAL

## 2023-02-23 VITALS
SYSTOLIC BLOOD PRESSURE: 122 MMHG | BODY MASS INDEX: 40.06 KG/M2 | WEIGHT: 240.75 LBS | DIASTOLIC BLOOD PRESSURE: 80 MMHG

## 2023-02-23 DIAGNOSIS — O20.9 BLEEDING IN EARLY PREGNANCY: Primary | ICD-10-CM

## 2023-02-23 DIAGNOSIS — Z32.01 POSITIVE PREGNANCY TEST: ICD-10-CM

## 2023-02-23 LAB
ABO + RH BLD: NORMAL
ALBUMIN SERPL BCP-MCNC: 3.8 G/DL (ref 3.5–5.2)
ALP SERPL-CCNC: 53 U/L (ref 55–135)
ALT SERPL W/O P-5'-P-CCNC: 31 U/L (ref 10–44)
ANION GAP SERPL CALC-SCNC: 9 MMOL/L (ref 8–16)
AST SERPL-CCNC: 28 U/L (ref 10–40)
BASOPHILS # BLD AUTO: 0.03 K/UL (ref 0–0.2)
BASOPHILS NFR BLD: 0.5 % (ref 0–1.9)
BILIRUB SERPL-MCNC: 0.5 MG/DL (ref 0.1–1)
BLD GP AB SCN CELLS X3 SERPL QL: NORMAL
BUN SERPL-MCNC: 8 MG/DL (ref 6–20)
CALCIUM SERPL-MCNC: 8.6 MG/DL (ref 8.7–10.5)
CHLORIDE SERPL-SCNC: 102 MMOL/L (ref 95–110)
CO2 SERPL-SCNC: 26 MMOL/L (ref 23–29)
CREAT SERPL-MCNC: 0.8 MG/DL (ref 0.5–1.4)
DIFFERENTIAL METHOD: ABNORMAL
EOSINOPHIL # BLD AUTO: 0.1 K/UL (ref 0–0.5)
EOSINOPHIL NFR BLD: 2.3 % (ref 0–8)
ERYTHROCYTE [DISTWIDTH] IN BLOOD BY AUTOMATED COUNT: 15.2 % (ref 11.5–14.5)
EST. GFR  (NO RACE VARIABLE): >60 ML/MIN/1.73 M^2
ESTIMATED AVG GLUCOSE: 94 MG/DL (ref 68–131)
GLUCOSE SERPL-MCNC: 89 MG/DL (ref 70–110)
HAV IGM SERPL QL IA: NORMAL
HBA1C MFR BLD: 4.9 % (ref 4–5.6)
HBV CORE IGM SERPL QL IA: NORMAL
HBV SURFACE AG SERPL QL IA: NORMAL
HCT VFR BLD AUTO: 33.6 % (ref 37–48.5)
HCV AB SERPL QL IA: NORMAL
HGB BLD-MCNC: 10.9 G/DL (ref 12–16)
HGB S BLD QL SOLY: NEGATIVE
HIV 1+2 AB+HIV1 P24 AG SERPL QL IA: NORMAL
IMM GRANULOCYTES # BLD AUTO: 0.02 K/UL (ref 0–0.04)
IMM GRANULOCYTES NFR BLD AUTO: 0.3 % (ref 0–0.5)
LYMPHOCYTES # BLD AUTO: 1.5 K/UL (ref 1–4.8)
LYMPHOCYTES NFR BLD: 25 % (ref 18–48)
MCH RBC QN AUTO: 28.9 PG (ref 27–31)
MCHC RBC AUTO-ENTMCNC: 32.4 G/DL (ref 32–36)
MCV RBC AUTO: 89 FL (ref 82–98)
MONOCYTES # BLD AUTO: 0.4 K/UL (ref 0.3–1)
MONOCYTES NFR BLD: 6.3 % (ref 4–15)
NEUTROPHILS # BLD AUTO: 3.9 K/UL (ref 1.8–7.7)
NEUTROPHILS NFR BLD: 65.6 % (ref 38–73)
NRBC BLD-RTO: 0 /100 WBC
PLATELET # BLD AUTO: 263 K/UL (ref 150–450)
PMV BLD AUTO: 11.9 FL (ref 9.2–12.9)
POTASSIUM SERPL-SCNC: 2.7 MMOL/L (ref 3.5–5.1)
PROT SERPL-MCNC: 7.8 G/DL (ref 6–8.4)
RBC # BLD AUTO: 3.77 M/UL (ref 4–5.4)
SODIUM SERPL-SCNC: 137 MMOL/L (ref 136–145)
WBC # BLD AUTO: 5.99 K/UL (ref 3.9–12.7)

## 2023-02-23 PROCEDURE — 99999 PR PBB SHADOW E&M-EST. PATIENT-LVL III: ICD-10-PCS | Mod: PBBFAC,,, | Performed by: ADVANCED PRACTICE MIDWIFE

## 2023-02-23 PROCEDURE — 87389 HIV-1 AG W/HIV-1&-2 AB AG IA: CPT | Performed by: OBSTETRICS & GYNECOLOGY

## 2023-02-23 PROCEDURE — 83036 HEMOGLOBIN GLYCOSYLATED A1C: CPT | Performed by: OBSTETRICS & GYNECOLOGY

## 2023-02-23 PROCEDURE — 80053 COMPREHEN METABOLIC PANEL: CPT | Performed by: OBSTETRICS & GYNECOLOGY

## 2023-02-23 PROCEDURE — 0502F PR SUBSEQUENT PRENATAL CARE: ICD-10-PCS | Mod: S$GLB,,, | Performed by: ADVANCED PRACTICE MIDWIFE

## 2023-02-23 PROCEDURE — 80074 ACUTE HEPATITIS PANEL: CPT | Performed by: OBSTETRICS & GYNECOLOGY

## 2023-02-23 PROCEDURE — 96372 THER/PROPH/DIAG INJ SC/IM: CPT | Mod: S$GLB,,, | Performed by: OBSTETRICS & GYNECOLOGY

## 2023-02-23 PROCEDURE — 76801 US OB/GYN PROCEDURE (VIEWPOINT): ICD-10-PCS | Mod: S$GLB,,, | Performed by: OBSTETRICS & GYNECOLOGY

## 2023-02-23 PROCEDURE — 1159F MED LIST DOCD IN RCRD: CPT | Mod: CPTII,S$GLB,, | Performed by: ADVANCED PRACTICE MIDWIFE

## 2023-02-23 PROCEDURE — 3008F BODY MASS INDEX DOCD: CPT | Mod: CPTII,S$GLB,, | Performed by: ADVANCED PRACTICE MIDWIFE

## 2023-02-23 PROCEDURE — 0502F SUBSEQUENT PRENATAL CARE: CPT | Mod: S$GLB,,, | Performed by: ADVANCED PRACTICE MIDWIFE

## 2023-02-23 PROCEDURE — 3008F PR BODY MASS INDEX (BMI) DOCUMENTED: ICD-10-PCS | Mod: CPTII,S$GLB,, | Performed by: ADVANCED PRACTICE MIDWIFE

## 2023-02-23 PROCEDURE — 99999 PR PBB SHADOW E&M-EST. PATIENT-LVL III: CPT | Mod: PBBFAC,,, | Performed by: ADVANCED PRACTICE MIDWIFE

## 2023-02-23 PROCEDURE — 86900 BLOOD TYPING SEROLOGIC ABO: CPT | Performed by: OBSTETRICS & GYNECOLOGY

## 2023-02-23 PROCEDURE — 3079F DIAST BP 80-89 MM HG: CPT | Mod: CPTII,S$GLB,, | Performed by: ADVANCED PRACTICE MIDWIFE

## 2023-02-23 PROCEDURE — 86592 SYPHILIS TEST NON-TREP QUAL: CPT | Performed by: OBSTETRICS & GYNECOLOGY

## 2023-02-23 PROCEDURE — 86762 RUBELLA ANTIBODY: CPT | Performed by: OBSTETRICS & GYNECOLOGY

## 2023-02-23 PROCEDURE — 76801 OB US < 14 WKS SINGLE FETUS: CPT | Mod: S$GLB,,, | Performed by: OBSTETRICS & GYNECOLOGY

## 2023-02-23 PROCEDURE — 85660 RBC SICKLE CELL TEST: CPT | Performed by: OBSTETRICS & GYNECOLOGY

## 2023-02-23 PROCEDURE — 3074F PR MOST RECENT SYSTOLIC BLOOD PRESSURE < 130 MM HG: ICD-10-PCS | Mod: CPTII,S$GLB,, | Performed by: ADVANCED PRACTICE MIDWIFE

## 2023-02-23 PROCEDURE — 3079F PR MOST RECENT DIASTOLIC BLOOD PRESSURE 80-89 MM HG: ICD-10-PCS | Mod: CPTII,S$GLB,, | Performed by: ADVANCED PRACTICE MIDWIFE

## 2023-02-23 PROCEDURE — 85025 COMPLETE CBC W/AUTO DIFF WBC: CPT | Performed by: OBSTETRICS & GYNECOLOGY

## 2023-02-23 PROCEDURE — 96372 RHO (D) IMMUNE GLOBULIN: ICD-10-PCS | Mod: S$GLB,,, | Performed by: OBSTETRICS & GYNECOLOGY

## 2023-02-23 PROCEDURE — 1159F PR MEDICATION LIST DOCUMENTED IN MEDICAL RECORD: ICD-10-PCS | Mod: CPTII,S$GLB,, | Performed by: ADVANCED PRACTICE MIDWIFE

## 2023-02-23 PROCEDURE — 3074F SYST BP LT 130 MM HG: CPT | Mod: CPTII,S$GLB,, | Performed by: ADVANCED PRACTICE MIDWIFE

## 2023-02-23 RX ORDER — POTASSIUM CHLORIDE 20 MEQ/1
40 TABLET, EXTENDED RELEASE ORAL 2 TIMES DAILY
Qty: 2 TABLET | Refills: 1 | OUTPATIENT
Start: 2023-02-23 | End: 2023-04-16

## 2023-02-23 NOTE — PROGRESS NOTES
Here today for NOB  Sono shows 5w6d no FP or YS GS 5W6d  Cramping and bleeding began Saturday Has increased in amount  Will do HCG today and repeat Saturday  Rhogam today RH negative  Would like MD appointment to schedule BTL after SAB confirmed  Will schedule after HCG   Would like cytotec if indicated  Will await labs

## 2023-02-23 NOTE — PROGRESS NOTES
Verified patient with 2 patient identifiers. Allergies and medications reviewed.   RhoGam given IM to left ventrogluteal using aseptic technique.   No discomfort noted. Patient tolerated well.     Patient advised to wait 15 minutes in lobby to monitor for reaction.   Patient verbalized understanding.

## 2023-02-24 LAB
RPR SER QL: NORMAL
RUBV IGG SER-ACNC: 39.3 IU/ML
RUBV IGG SER-IMP: REACTIVE

## 2023-02-24 NOTE — TELEPHONE ENCOUNTER
S/w patient regarding critical lab value, K 2.7. Patient is asymptomatic. Discussed increased potassium rich food intake. PO Kcl 40mEq x2 doses sent to pharmacy. ED precautions provided. Patient verbalizes understanding. Will message Dr. Fernández.

## 2023-02-25 ENCOUNTER — LAB VISIT (OUTPATIENT)
Dept: LAB | Facility: HOSPITAL | Age: 38
End: 2023-02-25
Attending: ADVANCED PRACTICE MIDWIFE
Payer: COMMERCIAL

## 2023-02-25 DIAGNOSIS — O20.9 BLEEDING IN EARLY PREGNANCY: ICD-10-CM

## 2023-02-25 LAB — HCG INTACT+B SERPL-ACNC: 946 MIU/ML

## 2023-02-25 PROCEDURE — 84702 CHORIONIC GONADOTROPIN TEST: CPT | Performed by: ADVANCED PRACTICE MIDWIFE

## 2023-02-25 PROCEDURE — 36415 COLL VENOUS BLD VENIPUNCTURE: CPT | Performed by: ADVANCED PRACTICE MIDWIFE

## 2023-02-27 ENCOUNTER — TELEPHONE (OUTPATIENT)
Dept: OBSTETRICS AND GYNECOLOGY | Facility: CLINIC | Age: 38
End: 2023-02-27
Payer: COMMERCIAL

## 2023-02-27 DIAGNOSIS — O02.1 MISSED AB: Primary | ICD-10-CM

## 2023-02-27 NOTE — PROGRESS NOTES
HCG ordered for 2 weeks  Passed POC on Thursday night  Scan bleeding and cramping  Desires birth control at next visit  Appointment made for follow up

## 2023-02-27 NOTE — TELEPHONE ENCOUNTER
----- Message from Susy Rivas CNM sent at 2/27/2023  8:29 AM CST -----  Regarding: miscarriage  Please make patient appointment for 2 weeks for follow up and birth control. I put labs in for her

## 2023-02-27 NOTE — TELEPHONE ENCOUNTER
Called patient and scheduled labs and appointment.  Patient is bringing FMLA papers to [employer].  Patient wanted CN to know she passed another clot.  Advised patient this is shad and further advised if saturating a pad/hour for 3 hours straight to go to ED, if not to monitor bleeding.  Patient verbalized understanding.

## 2023-03-11 ENCOUNTER — LAB VISIT (OUTPATIENT)
Dept: LAB | Facility: HOSPITAL | Age: 38
End: 2023-03-11
Attending: ADVANCED PRACTICE MIDWIFE
Payer: COMMERCIAL

## 2023-03-11 DIAGNOSIS — O02.1 MISSED AB: ICD-10-CM

## 2023-03-11 LAB — HCG INTACT+B SERPL-ACNC: 8.9 MIU/ML

## 2023-03-11 PROCEDURE — 84702 CHORIONIC GONADOTROPIN TEST: CPT | Performed by: ADVANCED PRACTICE MIDWIFE

## 2023-03-11 PROCEDURE — 36415 COLL VENOUS BLD VENIPUNCTURE: CPT | Performed by: ADVANCED PRACTICE MIDWIFE

## 2023-03-13 ENCOUNTER — OFFICE VISIT (OUTPATIENT)
Dept: OBSTETRICS AND GYNECOLOGY | Facility: CLINIC | Age: 38
End: 2023-03-13
Payer: COMMERCIAL

## 2023-03-13 VITALS
HEIGHT: 65 IN | SYSTOLIC BLOOD PRESSURE: 120 MMHG | BODY MASS INDEX: 40.88 KG/M2 | DIASTOLIC BLOOD PRESSURE: 80 MMHG | WEIGHT: 245.38 LBS

## 2023-03-13 DIAGNOSIS — O02.1 MISSED AB: Primary | ICD-10-CM

## 2023-03-13 PROBLEM — O09.521 MULTIGRAVIDA OF ADVANCED MATERNAL AGE IN FIRST TRIMESTER: Status: RESOLVED | Noted: 2023-02-14 | Resolved: 2023-03-13

## 2023-03-13 PROBLEM — O10.919 CHRONIC HYPERTENSION AFFECTING PREGNANCY: Status: RESOLVED | Noted: 2023-02-14 | Resolved: 2023-03-13

## 2023-03-13 PROBLEM — O34.219 HISTORY OF CESAREAN DELIVERY, CURRENTLY PREGNANT: Status: RESOLVED | Noted: 2023-02-14 | Resolved: 2023-03-13

## 2023-03-13 PROBLEM — O99.211 OBESITY AFFECTING PREGNANCY IN FIRST TRIMESTER: Status: RESOLVED | Noted: 2023-02-14 | Resolved: 2023-03-13

## 2023-03-13 PROBLEM — O20.9 BLEEDING IN EARLY PREGNANCY: Status: RESOLVED | Noted: 2023-02-23 | Resolved: 2023-03-13

## 2023-03-13 LAB
B-HCG UR QL: NEGATIVE
CTP QC/QA: YES

## 2023-03-13 PROCEDURE — 81025 URINE PREGNANCY TEST: CPT | Mod: S$GLB,,, | Performed by: ADVANCED PRACTICE MIDWIFE

## 2023-03-13 PROCEDURE — 3079F PR MOST RECENT DIASTOLIC BLOOD PRESSURE 80-89 MM HG: ICD-10-PCS | Mod: CPTII,S$GLB,, | Performed by: ADVANCED PRACTICE MIDWIFE

## 2023-03-13 PROCEDURE — 81025 PR  URINE PREGNANCY TEST: ICD-10-PCS | Mod: S$GLB,,, | Performed by: ADVANCED PRACTICE MIDWIFE

## 2023-03-13 PROCEDURE — 99999 PR PBB SHADOW E&M-EST. PATIENT-LVL III: CPT | Mod: PBBFAC,,, | Performed by: ADVANCED PRACTICE MIDWIFE

## 2023-03-13 PROCEDURE — 3008F PR BODY MASS INDEX (BMI) DOCUMENTED: ICD-10-PCS | Mod: CPTII,S$GLB,, | Performed by: ADVANCED PRACTICE MIDWIFE

## 2023-03-13 PROCEDURE — 3079F DIAST BP 80-89 MM HG: CPT | Mod: CPTII,S$GLB,, | Performed by: ADVANCED PRACTICE MIDWIFE

## 2023-03-13 PROCEDURE — 99999 PR PBB SHADOW E&M-EST. PATIENT-LVL III: ICD-10-PCS | Mod: PBBFAC,,, | Performed by: ADVANCED PRACTICE MIDWIFE

## 2023-03-13 PROCEDURE — 3008F BODY MASS INDEX DOCD: CPT | Mod: CPTII,S$GLB,, | Performed by: ADVANCED PRACTICE MIDWIFE

## 2023-03-13 PROCEDURE — 99213 PR OFFICE/OUTPT VISIT, EST, LEVL III, 20-29 MIN: ICD-10-PCS | Mod: S$GLB,,, | Performed by: ADVANCED PRACTICE MIDWIFE

## 2023-03-13 PROCEDURE — 3074F PR MOST RECENT SYSTOLIC BLOOD PRESSURE < 130 MM HG: ICD-10-PCS | Mod: CPTII,S$GLB,, | Performed by: ADVANCED PRACTICE MIDWIFE

## 2023-03-13 PROCEDURE — 3074F SYST BP LT 130 MM HG: CPT | Mod: CPTII,S$GLB,, | Performed by: ADVANCED PRACTICE MIDWIFE

## 2023-03-13 PROCEDURE — 3044F PR MOST RECENT HEMOGLOBIN A1C LEVEL <7.0%: ICD-10-PCS | Mod: CPTII,S$GLB,, | Performed by: ADVANCED PRACTICE MIDWIFE

## 2023-03-13 PROCEDURE — 99213 OFFICE O/P EST LOW 20 MIN: CPT | Mod: S$GLB,,, | Performed by: ADVANCED PRACTICE MIDWIFE

## 2023-03-13 PROCEDURE — 3044F HG A1C LEVEL LT 7.0%: CPT | Mod: CPTII,S$GLB,, | Performed by: ADVANCED PRACTICE MIDWIFE

## 2023-03-13 RX ORDER — ACETAMINOPHEN AND CODEINE PHOSPHATE 120; 12 MG/5ML; MG/5ML
1 SOLUTION ORAL DAILY
Qty: 90 TABLET | Refills: 3 | Status: SHIPPED | OUTPATIENT
Start: 2023-03-13 | End: 2023-08-18

## 2023-03-13 NOTE — PROGRESS NOTES
Her today for follow up from [facility] began bleeding 2/20 and bleed for 1 week. Heavy menses, passed small clots and scant tissue.Pregnancy test today negative  Will do HCG in 2 weeks HCG 8.9 3/11  Requesting birth control today  Will begin micronor today  Aware of benefits risks alternatives   All questions answered  Needs follow up pap in 8/ 2023

## 2023-03-14 ENCOUNTER — OFFICE VISIT (OUTPATIENT)
Dept: INTERNAL MEDICINE | Facility: CLINIC | Age: 38
End: 2023-03-14
Payer: COMMERCIAL

## 2023-03-14 VITALS
OXYGEN SATURATION: 99 % | HEIGHT: 65 IN | HEART RATE: 71 BPM | BODY MASS INDEX: 40.81 KG/M2 | SYSTOLIC BLOOD PRESSURE: 124 MMHG | WEIGHT: 244.94 LBS | RESPIRATION RATE: 18 BRPM | DIASTOLIC BLOOD PRESSURE: 88 MMHG

## 2023-03-14 DIAGNOSIS — D50.9 IRON DEFICIENCY ANEMIA, UNSPECIFIED IRON DEFICIENCY ANEMIA TYPE: ICD-10-CM

## 2023-03-14 DIAGNOSIS — I10 ESSENTIAL HYPERTENSION: ICD-10-CM

## 2023-03-14 DIAGNOSIS — F32.0 CURRENT MILD EPISODE OF MAJOR DEPRESSIVE DISORDER WITHOUT PRIOR EPISODE: ICD-10-CM

## 2023-03-14 DIAGNOSIS — E66.01 MORBID OBESITY WITH BMI OF 40.0-44.9, ADULT: ICD-10-CM

## 2023-03-14 DIAGNOSIS — Z00.00 ROUTINE GENERAL MEDICAL EXAMINATION AT A HEALTH CARE FACILITY: Primary | ICD-10-CM

## 2023-03-14 DIAGNOSIS — E87.6 HYPOKALEMIA: ICD-10-CM

## 2023-03-14 LAB
BACTERIA #/AREA URNS HPF: ABNORMAL /HPF
BILIRUB UR QL STRIP: NEGATIVE
CLARITY UR: CLEAR
COLOR UR: YELLOW
GLUCOSE UR QL STRIP: NEGATIVE
HGB UR QL STRIP: ABNORMAL
KETONES UR QL STRIP: NEGATIVE
LEUKOCYTE ESTERASE UR QL STRIP: ABNORMAL
MICROSCOPIC COMMENT: ABNORMAL
NITRITE UR QL STRIP: NEGATIVE
PH UR STRIP: 7 [PH] (ref 5–8)
POTASSIUM SERPL-SCNC: 2.4 MMOL/L (ref 3.5–5.1)
PROT UR QL STRIP: NEGATIVE
RBC #/AREA URNS HPF: 2 /HPF (ref 0–4)
SP GR UR STRIP: 1.01 (ref 1–1.03)
SQUAMOUS #/AREA URNS HPF: 25 /HPF
URN SPEC COLLECT METH UR: ABNORMAL
WBC #/AREA URNS HPF: 5 /HPF (ref 0–5)

## 2023-03-14 PROCEDURE — 99213 PR OFFICE/OUTPT VISIT, EST, LEVL III, 20-29 MIN: ICD-10-PCS | Mod: 25,S$GLB,, | Performed by: FAMILY MEDICINE

## 2023-03-14 PROCEDURE — 3008F PR BODY MASS INDEX (BMI) DOCUMENTED: ICD-10-PCS | Mod: CPTII,S$GLB,, | Performed by: FAMILY MEDICINE

## 2023-03-14 PROCEDURE — 99395 PREV VISIT EST AGE 18-39: CPT | Mod: S$GLB,,, | Performed by: FAMILY MEDICINE

## 2023-03-14 PROCEDURE — 1159F PR MEDICATION LIST DOCUMENTED IN MEDICAL RECORD: ICD-10-PCS | Mod: CPTII,S$GLB,, | Performed by: FAMILY MEDICINE

## 2023-03-14 PROCEDURE — 3044F HG A1C LEVEL LT 7.0%: CPT | Mod: CPTII,S$GLB,, | Performed by: FAMILY MEDICINE

## 2023-03-14 PROCEDURE — 3074F SYST BP LT 130 MM HG: CPT | Mod: CPTII,S$GLB,, | Performed by: FAMILY MEDICINE

## 2023-03-14 PROCEDURE — 99213 OFFICE O/P EST LOW 20 MIN: CPT | Mod: 25,S$GLB,, | Performed by: FAMILY MEDICINE

## 2023-03-14 PROCEDURE — 3079F DIAST BP 80-89 MM HG: CPT | Mod: CPTII,S$GLB,, | Performed by: FAMILY MEDICINE

## 2023-03-14 PROCEDURE — 80061 LIPID PANEL: CPT | Performed by: FAMILY MEDICINE

## 2023-03-14 PROCEDURE — 99999 PR PBB SHADOW E&M-EST. PATIENT-LVL III: ICD-10-PCS | Mod: PBBFAC,,, | Performed by: FAMILY MEDICINE

## 2023-03-14 PROCEDURE — 3044F PR MOST RECENT HEMOGLOBIN A1C LEVEL <7.0%: ICD-10-PCS | Mod: CPTII,S$GLB,, | Performed by: FAMILY MEDICINE

## 2023-03-14 PROCEDURE — 1160F RVW MEDS BY RX/DR IN RCRD: CPT | Mod: CPTII,S$GLB,, | Performed by: FAMILY MEDICINE

## 2023-03-14 PROCEDURE — 3074F PR MOST RECENT SYSTOLIC BLOOD PRESSURE < 130 MM HG: ICD-10-PCS | Mod: CPTII,S$GLB,, | Performed by: FAMILY MEDICINE

## 2023-03-14 PROCEDURE — 85025 COMPLETE CBC W/AUTO DIFF WBC: CPT | Performed by: FAMILY MEDICINE

## 2023-03-14 PROCEDURE — 99395 PR PREVENTIVE VISIT,EST,18-39: ICD-10-PCS | Mod: S$GLB,,, | Performed by: FAMILY MEDICINE

## 2023-03-14 PROCEDURE — 82728 ASSAY OF FERRITIN: CPT | Performed by: FAMILY MEDICINE

## 2023-03-14 PROCEDURE — 99999 PR PBB SHADOW E&M-EST. PATIENT-LVL III: CPT | Mod: PBBFAC,,, | Performed by: FAMILY MEDICINE

## 2023-03-14 PROCEDURE — 1160F PR REVIEW ALL MEDS BY PRESCRIBER/CLIN PHARMACIST DOCUMENTED: ICD-10-PCS | Mod: CPTII,S$GLB,, | Performed by: FAMILY MEDICINE

## 2023-03-14 PROCEDURE — 84132 ASSAY OF SERUM POTASSIUM: CPT | Performed by: FAMILY MEDICINE

## 2023-03-14 PROCEDURE — 3008F BODY MASS INDEX DOCD: CPT | Mod: CPTII,S$GLB,, | Performed by: FAMILY MEDICINE

## 2023-03-14 PROCEDURE — 3079F PR MOST RECENT DIASTOLIC BLOOD PRESSURE 80-89 MM HG: ICD-10-PCS | Mod: CPTII,S$GLB,, | Performed by: FAMILY MEDICINE

## 2023-03-14 PROCEDURE — 1159F MED LIST DOCD IN RCRD: CPT | Mod: CPTII,S$GLB,, | Performed by: FAMILY MEDICINE

## 2023-03-14 PROCEDURE — 81000 URINALYSIS NONAUTO W/SCOPE: CPT | Performed by: FAMILY MEDICINE

## 2023-03-14 PROCEDURE — 84466 ASSAY OF TRANSFERRIN: CPT | Performed by: FAMILY MEDICINE

## 2023-03-14 RX ORDER — SERTRALINE HYDROCHLORIDE 25 MG/1
25 TABLET, FILM COATED ORAL DAILY
Qty: 30 TABLET | Refills: 3 | Status: SHIPPED | OUTPATIENT
Start: 2023-03-14 | End: 2023-07-04

## 2023-03-14 RX ORDER — LABETALOL 200 MG/1
TABLET, FILM COATED ORAL
Qty: 180 TABLET | Refills: 1 | Status: SHIPPED | OUTPATIENT
Start: 2023-03-14

## 2023-03-14 NOTE — PROGRESS NOTES
"Subjective:       Patient ID: Maglorzata Yoder is a 38 y.o. female.    Chief Complaint: Follow-up    38-year-old  female patient with Patient Active Problem List:     Essential hypertension     Hypokalemia     Carpal tunnel syndrome on both sides     Morbid obesity with BMI of 40.0-44.9, adult  Here for routine annual physicals  Patient reports that she had miscarriage recently, could be the reason for anemia  Requesting refill for labetalol and has been taking potassium supplements 40 mg daily but not twice a day-had extremely low potassium levels  Reports that she has been lately depressed and would like to consider starting on antidepressant-has been having no motivation and low energy which denies any suicidal homicidal thoughts  Denies any problems with carpal tunnel      Review of Systems   Constitutional:  Negative for fatigue.   Eyes:  Negative for visual disturbance.   Respiratory:  Negative for shortness of breath.    Cardiovascular:  Negative for chest pain and leg swelling.   Gastrointestinal:  Negative for abdominal pain, nausea and vomiting.   Musculoskeletal:  Negative for myalgias.   Skin:  Negative for rash.   Neurological:  Negative for light-headedness and headaches.   Psychiatric/Behavioral:  Positive for dysphoric mood. Negative for decreased concentration and sleep disturbance. The patient is not nervous/anxious.        /88 (BP Location: Right arm, Patient Position: Sitting, BP Method: Large (Manual))   Pulse 71   Resp 18   Ht 5' 5" (1.651 m)   Wt 111.1 kg (244 lb 14.9 oz)   LMP  (LMP Unknown) Comment: miscarriage  SpO2 99%   BMI 40.76 kg/m²   Objective:      Physical Exam  Constitutional:       Appearance: She is well-developed.   HENT:      Head: Normocephalic and atraumatic.   Cardiovascular:      Rate and Rhythm: Normal rate and regular rhythm.      Heart sounds: Normal heart sounds. No murmur heard.  Pulmonary:      Effort: Pulmonary effort is normal.     "  Breath sounds: Normal breath sounds. No wheezing.   Abdominal:      General: Bowel sounds are normal.      Palpations: Abdomen is soft.      Tenderness: There is no abdominal tenderness.   Skin:     General: Skin is warm and dry.      Findings: No rash.   Neurological:      Mental Status: She is alert and oriented to person, place, and time.   Psychiatric:         Mood and Affect: Mood normal.         Assessment/Plan:   1. Routine general medical examination at a health care facility  -     Urinalysis, Reflex to Urine Culture Urine, Clean Catch; Future; Expected date: 03/14/2023  -     Cancel: Hemoglobin A1C; Future; Expected date: 03/14/2023  -     Cancel: TSH; Future; Expected date: 03/14/2023  -     Lipid Panel; Future; Expected date: 03/14/2023  -     Cancel: Comprehensive Metabolic Panel; Future; Expected date: 03/14/2023  -     CBC Auto Differential; Future; Expected date: 03/14/2023  Vital signs stable today.  Clinical exam stable  Continue lifestyle modifications with low-fat and low-cholesterol diet and exercise 30 minutes daily      2. Essential hypertension  Overview:   Diagnosed in 2012 following 1st delivery.  Lisinopril 20 mg HCTZ 25 mg, 1 tab q.d. Since 2015.   07 /02/2019 Changed to labetalol 100 mg twice a day  Orders:  -     labetaloL (NORMODYNE) 200 MG tablet; TAKE 1 TABLET(200 MG) BY MOUTH EVERY 12 HOURS  Dispense: 180 tablet; Refill: 1  -     Urinalysis, Reflex to Urine Culture Urine, Clean Catch; Future; Expected date: 03/14/2023  -     Cancel: TSH; Future; Expected date: 03/14/2023  -     Lipid Panel; Future; Expected date: 03/14/2023  -     Cancel: Comprehensive Metabolic Panel; Future; Expected date: 03/14/2023  Refill given on labetalol 200 mg twice daily  Restrict salt intake and eat low-fat and low-cholesterol diet    3. Hypokalemia  -     Cancel: Comprehensive Metabolic Panel; Future; Expected date: 03/14/2023  -     POTASSIUM; Future; Expected date: 03/14/2023  Noted extremely low  potassium levels-will recheck to see if there is any improvement in potassium levels currently taking 40 meq once daily    4. Morbid obesity with BMI of 40.0-44.9, adult  Strict lifestyle changes recommended with diet and exercise to lose weight with BMI 40    5. Iron deficiency anemia, unspecified iron deficiency anemia type  -     Iron and TIBC; Future; Expected date: 03/14/2023  -     Ferritin; Future; Expected date: 03/14/2023  Reports recently had miscarriage  Will recheck labs  Encouraged to eat iron and protein rich diet    6. Current mild episode of major depressive disorder without prior episode  -     sertraline (ZOLOFT) 25 MG tablet; Take 1 tablet (25 mg total) by mouth once daily.  Dispense: 30 tablet; Refill: 3  will get started on Zoloft 25 mg daily

## 2023-03-15 ENCOUNTER — TELEPHONE (OUTPATIENT)
Dept: INTERNAL MEDICINE | Facility: CLINIC | Age: 38
End: 2023-03-15
Payer: COMMERCIAL

## 2023-03-15 LAB
BASOPHILS # BLD AUTO: 0.05 K/UL (ref 0–0.2)
BASOPHILS NFR BLD: 0.7 % (ref 0–1.9)
CHOLEST SERPL-MCNC: 182 MG/DL (ref 120–199)
CHOLEST/HDLC SERPL: 3.6 {RATIO} (ref 2–5)
DIFFERENTIAL METHOD: ABNORMAL
EOSINOPHIL # BLD AUTO: 0.2 K/UL (ref 0–0.5)
EOSINOPHIL NFR BLD: 3.6 % (ref 0–8)
ERYTHROCYTE [DISTWIDTH] IN BLOOD BY AUTOMATED COUNT: 14.5 % (ref 11.5–14.5)
FERRITIN SERPL-MCNC: 31 NG/ML (ref 20–300)
HCT VFR BLD AUTO: 35.4 % (ref 37–48.5)
HDLC SERPL-MCNC: 50 MG/DL (ref 40–75)
HDLC SERPL: 27.5 % (ref 20–50)
HGB BLD-MCNC: 11.2 G/DL (ref 12–16)
IMM GRANULOCYTES # BLD AUTO: 0.01 K/UL (ref 0–0.04)
IMM GRANULOCYTES NFR BLD AUTO: 0.1 % (ref 0–0.5)
IRON SERPL-MCNC: 51 UG/DL (ref 30–160)
LDLC SERPL CALC-MCNC: 114.4 MG/DL (ref 63–159)
LYMPHOCYTES # BLD AUTO: 2.3 K/UL (ref 1–4.8)
LYMPHOCYTES NFR BLD: 34.8 % (ref 18–48)
MCH RBC QN AUTO: 29 PG (ref 27–31)
MCHC RBC AUTO-ENTMCNC: 31.6 G/DL (ref 32–36)
MCV RBC AUTO: 92 FL (ref 82–98)
MONOCYTES # BLD AUTO: 0.5 K/UL (ref 0.3–1)
MONOCYTES NFR BLD: 8 % (ref 4–15)
NEUTROPHILS # BLD AUTO: 3.6 K/UL (ref 1.8–7.7)
NEUTROPHILS NFR BLD: 52.8 % (ref 38–73)
NONHDLC SERPL-MCNC: 132 MG/DL
NRBC BLD-RTO: 0 /100 WBC
PLATELET # BLD AUTO: 358 K/UL (ref 150–450)
PMV BLD AUTO: 12 FL (ref 9.2–12.9)
RBC # BLD AUTO: 3.86 M/UL (ref 4–5.4)
SATURATED IRON: 11 % (ref 20–50)
TOTAL IRON BINDING CAPACITY: 444 UG/DL (ref 250–450)
TRANSFERRIN SERPL-MCNC: 300 MG/DL (ref 200–375)
TRIGL SERPL-MCNC: 88 MG/DL (ref 30–150)
WBC # BLD AUTO: 6.73 K/UL (ref 3.9–12.7)

## 2023-03-15 NOTE — TELEPHONE ENCOUNTER
Labs showed extremely low potassium at 2.4-patient has been taking potassium supplements 40 mEq once daily-please advise patient to go to ER for IV potassium immediately and then increase potassium supplements to 40 meq twice daily    Left a voice message but please try to contact patient again immediately    Urine shows few trace white blood cells but no significant infection noted-if having any urinary discomfort like burning sensation with urination or increased urinary frequency let us know    Noted improvement in anemia-continue taking iron supplements  Normal cholesterol levels

## 2023-03-15 NOTE — TELEPHONE ENCOUNTER
S/W pt informed her of lab results and MD orders to go to ER immediately for IV Potassium due to low potassium lab. Pt stated she received the VM from Dr. Abdul but her son is sick so she's trying to get him together to go back to school. Pt stated she will go to the hospital on Friday.

## 2023-03-29 ENCOUNTER — LAB VISIT (OUTPATIENT)
Dept: LAB | Facility: HOSPITAL | Age: 38
End: 2023-03-29
Attending: PSYCHIATRY & NEUROLOGY
Payer: COMMERCIAL

## 2023-03-29 DIAGNOSIS — O02.1 MISSED AB: ICD-10-CM

## 2023-03-29 LAB — HCG INTACT+B SERPL-ACNC: <2.4 MIU/ML

## 2023-03-29 PROCEDURE — 36415 COLL VENOUS BLD VENIPUNCTURE: CPT | Performed by: ADVANCED PRACTICE MIDWIFE

## 2023-03-29 PROCEDURE — 84702 CHORIONIC GONADOTROPIN TEST: CPT | Performed by: ADVANCED PRACTICE MIDWIFE

## 2023-04-16 ENCOUNTER — HOSPITAL ENCOUNTER (EMERGENCY)
Facility: HOSPITAL | Age: 38
Discharge: HOME | End: 2023-04-16
Attending: EMERGENCY MEDICINE
Payer: COMMERCIAL

## 2023-04-16 VITALS
TEMPERATURE: 99 F | RESPIRATION RATE: 15 BRPM | BODY MASS INDEX: 41.68 KG/M2 | WEIGHT: 250.44 LBS | HEART RATE: 84 BPM | DIASTOLIC BLOOD PRESSURE: 73 MMHG | SYSTOLIC BLOOD PRESSURE: 131 MMHG | OXYGEN SATURATION: 99 %

## 2023-04-16 DIAGNOSIS — E87.6 HYPOKALEMIA: ICD-10-CM

## 2023-04-16 DIAGNOSIS — R60.9 SWELLING: ICD-10-CM

## 2023-04-16 DIAGNOSIS — M79.89 LEG SWELLING: Primary | ICD-10-CM

## 2023-04-16 LAB
ALBUMIN SERPL BCP-MCNC: 4.1 G/DL (ref 3.5–5.2)
ALP SERPL-CCNC: 56 U/L (ref 55–135)
ALT SERPL W/O P-5'-P-CCNC: 17 U/L (ref 10–44)
ANION GAP SERPL CALC-SCNC: 11 MMOL/L (ref 8–16)
ANION GAP SERPL CALC-SCNC: 9 MMOL/L (ref 8–16)
AST SERPL-CCNC: 19 U/L (ref 10–40)
B-HCG UR QL: NEGATIVE
BASOPHILS # BLD AUTO: 0.05 K/UL (ref 0–0.2)
BASOPHILS NFR BLD: 0.6 % (ref 0–1.9)
BILIRUB SERPL-MCNC: 0.3 MG/DL (ref 0.1–1)
BNP SERPL-MCNC: 18 PG/ML (ref 0–99)
BUN SERPL-MCNC: 12 MG/DL (ref 6–20)
BUN SERPL-MCNC: 9 MG/DL (ref 6–20)
CALCIUM SERPL-MCNC: 10 MG/DL (ref 8.7–10.5)
CALCIUM SERPL-MCNC: 9 MG/DL (ref 8.7–10.5)
CHLORIDE SERPL-SCNC: 100 MMOL/L (ref 95–110)
CHLORIDE SERPL-SCNC: 105 MMOL/L (ref 95–110)
CO2 SERPL-SCNC: 26 MMOL/L (ref 23–29)
CO2 SERPL-SCNC: 26 MMOL/L (ref 23–29)
CREAT SERPL-MCNC: 1 MG/DL (ref 0.5–1.4)
CREAT SERPL-MCNC: 1.3 MG/DL (ref 0.5–1.4)
DIFFERENTIAL METHOD: ABNORMAL
EOSINOPHIL # BLD AUTO: 0.2 K/UL (ref 0–0.5)
EOSINOPHIL NFR BLD: 2.8 % (ref 0–8)
ERYTHROCYTE [DISTWIDTH] IN BLOOD BY AUTOMATED COUNT: 13.4 % (ref 11.5–14.5)
EST. GFR  (NO RACE VARIABLE): 54 ML/MIN/1.73 M^2
EST. GFR  (NO RACE VARIABLE): >60 ML/MIN/1.73 M^2
GLUCOSE SERPL-MCNC: 111 MG/DL (ref 70–110)
GLUCOSE SERPL-MCNC: 125 MG/DL (ref 70–110)
HCT VFR BLD AUTO: 31.7 % (ref 37–48.5)
HGB BLD-MCNC: 10.6 G/DL (ref 12–16)
IMM GRANULOCYTES # BLD AUTO: 0.02 K/UL (ref 0–0.04)
IMM GRANULOCYTES NFR BLD AUTO: 0.2 % (ref 0–0.5)
LYMPHOCYTES # BLD AUTO: 2.7 K/UL (ref 1–4.8)
LYMPHOCYTES NFR BLD: 31.9 % (ref 18–48)
MAGNESIUM SERPL-MCNC: 2.1 MG/DL (ref 1.6–2.6)
MCH RBC QN AUTO: 28.3 PG (ref 27–31)
MCHC RBC AUTO-ENTMCNC: 33.4 G/DL (ref 32–36)
MCV RBC AUTO: 85 FL (ref 82–98)
MONOCYTES # BLD AUTO: 0.6 K/UL (ref 0.3–1)
MONOCYTES NFR BLD: 6.6 % (ref 4–15)
NEUTROPHILS # BLD AUTO: 4.9 K/UL (ref 1.8–7.7)
NEUTROPHILS NFR BLD: 57.9 % (ref 38–73)
NRBC BLD-RTO: 0 /100 WBC
PLATELET # BLD AUTO: 298 K/UL (ref 150–450)
PMV BLD AUTO: 11.4 FL (ref 9.2–12.9)
POTASSIUM SERPL-SCNC: 2.6 MMOL/L (ref 3.5–5.1)
POTASSIUM SERPL-SCNC: 3 MMOL/L (ref 3.5–5.1)
PROT SERPL-MCNC: 8.6 G/DL (ref 6–8.4)
RBC # BLD AUTO: 3.74 M/UL (ref 4–5.4)
SODIUM SERPL-SCNC: 137 MMOL/L (ref 136–145)
SODIUM SERPL-SCNC: 140 MMOL/L (ref 136–145)
TROPONIN I SERPL DL<=0.01 NG/ML-MCNC: <0.006 NG/ML (ref 0–0.03)
TSH SERPL DL<=0.005 MIU/L-ACNC: 2.49 UIU/ML (ref 0.4–4)
WBC # BLD AUTO: 8.52 K/UL (ref 3.9–12.7)

## 2023-04-16 PROCEDURE — 80053 COMPREHEN METABOLIC PANEL: CPT | Performed by: EMERGENCY MEDICINE

## 2023-04-16 PROCEDURE — 99291 CRITICAL CARE FIRST HOUR: CPT | Mod: 25

## 2023-04-16 PROCEDURE — 93005 ELECTROCARDIOGRAM TRACING: CPT

## 2023-04-16 PROCEDURE — 84484 ASSAY OF TROPONIN QUANT: CPT | Performed by: EMERGENCY MEDICINE

## 2023-04-16 PROCEDURE — 85025 COMPLETE CBC W/AUTO DIFF WBC: CPT | Performed by: EMERGENCY MEDICINE

## 2023-04-16 PROCEDURE — 25000003 PHARM REV CODE 250: Performed by: EMERGENCY MEDICINE

## 2023-04-16 PROCEDURE — 93010 EKG 12-LEAD: ICD-10-PCS | Mod: 76,,, | Performed by: STUDENT IN AN ORGANIZED HEALTH CARE EDUCATION/TRAINING PROGRAM

## 2023-04-16 PROCEDURE — 80048 BASIC METABOLIC PNL TOTAL CA: CPT | Mod: XB | Performed by: EMERGENCY MEDICINE

## 2023-04-16 PROCEDURE — 93010 ELECTROCARDIOGRAM REPORT: CPT | Mod: 76,,, | Performed by: STUDENT IN AN ORGANIZED HEALTH CARE EDUCATION/TRAINING PROGRAM

## 2023-04-16 PROCEDURE — 83735 ASSAY OF MAGNESIUM: CPT | Performed by: EMERGENCY MEDICINE

## 2023-04-16 PROCEDURE — 96368 THER/DIAG CONCURRENT INF: CPT

## 2023-04-16 PROCEDURE — 83880 ASSAY OF NATRIURETIC PEPTIDE: CPT | Performed by: EMERGENCY MEDICINE

## 2023-04-16 PROCEDURE — 63600175 PHARM REV CODE 636 W HCPCS: Performed by: EMERGENCY MEDICINE

## 2023-04-16 PROCEDURE — 96366 THER/PROPH/DIAG IV INF ADDON: CPT

## 2023-04-16 PROCEDURE — 84443 ASSAY THYROID STIM HORMONE: CPT | Performed by: EMERGENCY MEDICINE

## 2023-04-16 PROCEDURE — 96365 THER/PROPH/DIAG IV INF INIT: CPT

## 2023-04-16 PROCEDURE — 93010 ELECTROCARDIOGRAM REPORT: CPT | Mod: ,,, | Performed by: STUDENT IN AN ORGANIZED HEALTH CARE EDUCATION/TRAINING PROGRAM

## 2023-04-16 PROCEDURE — 96375 TX/PRO/DX INJ NEW DRUG ADDON: CPT

## 2023-04-16 PROCEDURE — 81025 URINE PREGNANCY TEST: CPT | Performed by: EMERGENCY MEDICINE

## 2023-04-16 RX ORDER — MAGNESIUM SULFATE HEPTAHYDRATE 40 MG/ML
2 INJECTION, SOLUTION INTRAVENOUS
Status: COMPLETED | OUTPATIENT
Start: 2023-04-16 | End: 2023-04-16

## 2023-04-16 RX ORDER — POTASSIUM CHLORIDE 14.9 MG/ML
20 INJECTION INTRAVENOUS
Status: DISCONTINUED | OUTPATIENT
Start: 2023-04-16 | End: 2023-04-16

## 2023-04-16 RX ORDER — POTASSIUM CHLORIDE 7.45 MG/ML
10 INJECTION INTRAVENOUS
Status: DISPENSED | OUTPATIENT
Start: 2023-04-16 | End: 2023-04-16

## 2023-04-16 RX ORDER — POTASSIUM CHLORIDE 20 MEQ/15ML
20 SOLUTION ORAL DAILY
Qty: 500 ML | Refills: 0 | Status: SHIPPED | OUTPATIENT
Start: 2023-04-16 | End: 2023-09-29 | Stop reason: SDUPTHER

## 2023-04-16 RX ORDER — POTASSIUM CHLORIDE 20 MEQ/1
40 TABLET, EXTENDED RELEASE ORAL
Status: COMPLETED | OUTPATIENT
Start: 2023-04-16 | End: 2023-04-16

## 2023-04-16 RX ORDER — AMLODIPINE BESYLATE 10 MG/1
10 TABLET ORAL
COMMUNITY
Start: 2023-04-10 | End: 2023-09-15

## 2023-04-16 RX ADMIN — POTASSIUM CHLORIDE 40 MEQ: 1500 TABLET, EXTENDED RELEASE ORAL at 05:04

## 2023-04-16 RX ADMIN — MAGNESIUM SULFATE HEPTAHYDRATE 2 G: 40 INJECTION, SOLUTION INTRAVENOUS at 05:04

## 2023-04-16 RX ADMIN — POTASSIUM CHLORIDE 10 MEQ: 7.46 INJECTION, SOLUTION INTRAVENOUS at 06:04

## 2023-04-16 RX ADMIN — POTASSIUM CHLORIDE 10 MEQ: 7.46 INJECTION, SOLUTION INTRAVENOUS at 05:04

## 2023-04-16 NOTE — ED PROVIDER NOTES
SCRIBE #1 NOTE: IEfren, am scribing for, and in the presence of, Kamille Hoyt DO. I have scribed the HPI, ROS, and PEx.     SCRIBE #2 NOTE: I, Elgin Hendrix, am scribing for, and in the presence of,  Dang Cruz MD. I have scribed the remaining portions of the note not scribed by Scribe #1.    History     Chief Complaint   Patient presents with    Leg Swelling     New onset BLE swelling since Friday, unrelieved with elevation, nontender. Hx htn, compliant with meds. Denies any cp/sob/chf     Review of patient's allergies indicates:  No Known Allergies      History of Present Illness     HPI    2023, 4:10 AM  History obtained from the patient      History of Present Illness: Malgorzata Yoder is a 38 y.o. female patient with a PMHx of asthma, HTN, and hypokalemia who presents to the Emergency Department for evaluation of bilateral leg swelling which onset Friday. No associated sxs reported. Patient denies any CP, SOB, fever, chills, nausea, vomiting, diarrhea, and all other sxs at this time. No prior Tx reported.  Patient admits to noncompliance with her potassium replacement supplements at home. No further complaints or concerns at this time.       Arrival mode: Personal vehicle    PCP: Sofia Abdul MD        Past Medical History:  Past Medical History:   Diagnosis Date    Asthma     childhood    Cervical cerclage suture present in second trimester 9/10/2019    12:00. Catina cerclage    H/O incompetent cervix, currently pregnant, second trimester 9/10/2019    History of cerclage, currently pregnant 2019. Late prenatal care at 4 months, incidental finding of membranes in vaginal vault, cerclage placed per [facility] - report requested    History of  delivery, currently pregnant 2019 failed  Cytotec induction of labor at 37 weeks secondary to  severe preeclampsia - [facility] -Dr. Cass Haywood   C/S -(uterine incision not documented) secondary to  severe preeclampsia and recurrent late deceleration with SGA baby 4 lb 15 oz.  Dense fibrotic scar tissue was noted during surgery    History of ovarian cyst     Hypertension     Hypokalemia 2014    Paresthesia and pain of both upper extremities 2019       Past Surgical History:  Past Surgical History:   Procedure Laterality Date    CARPAL TUNNEL RELEASE Bilateral     CERVICAL CERCLAGE N/A 09/10/2019    Procedure: CERCLAGE, CERVIX;  Surgeon: Vandana Smith MD;  Location: [facility] OR;  Service: OB/GYN;  Laterality: N/A;     SECTION       SECTION N/A 2020    Procedure:  SECTION;  Surgeon: Vandana Smith MD;  Location: [facility] L&D;  Service: OB/GYN;  Laterality: N/A;    MOUTH SURGERY      OOPHORECTOMY Right 2010         Family History:  Family History   Problem Relation Age of Onset    Aneurysm Mother     Hypertension Mother     Miscarriages / Stillbirths Mother     Cancer Maternal Aunt         brain tumor    Breast cancer Maternal Grandmother     Cancer Maternal Grandmother     Learning disabilities Daughter     Colon cancer Neg Hx     Ovarian cancer Neg Hx     Uterine cancer Neg Hx     Cervical cancer Neg Hx        Social History:  Social History     Tobacco Use    Smoking status: Never    Smokeless tobacco: Never   Substance and Sexual Activity    Alcohol use: No    Drug use: No    Sexual activity: Yes     Partners: Male     Birth control/protection: None        Review of Systems     Review of Systems   Constitutional:  Negative for chills and fever.   Respiratory:  Negative for shortness of breath.    Cardiovascular:  Positive for leg swelling (bilateral). Negative for chest pain.   Gastrointestinal:  Negative for diarrhea, nausea and vomiting.      Physical Exam     Initial Vitals [23 0346]   BP Pulse Resp Temp SpO2   (!) 149/74 86 19 98.6 °F (37 °C) 100 %      MAP       --          Physical Exam  Nursing Notes and Vital Signs Reviewed.  Constitutional: Patient is in no  acute distress. Well-developed and well-nourished.  Head: Atraumatic. Normocephalic.  Eyes: PERRL. EOM intact. Conjunctivae are not pale. No scleral icterus.  ENT: Mucous membranes are moist. Oropharynx is clear and symmetric.    Neck: No JVD.  Cardiovascular: Regular rate. Regular rhythm. No murmurs, rubs, or gallops. Distal pulses (DP) are 2+ and symmetric.  Pulmonary/Chest: No respiratory distress. Clear to auscultation bilaterally. No wheezing or rales.  Abdominal: Soft and non-distended.  There is no tenderness.  No rebound, guarding, or rigidity. Good bowel sounds.  Genitourinary: No CVA tenderness  Musculoskeletal: Moves all extremities. No obvious deformities. Trace edema to bilateral LE. No calf tenderness.  Skin: Warm and dry.  Neurological:  Alert, awake, and appropriate.  Normal speech.  No acute focal neurological deficits are appreciated.  Psychiatric: Normal affect. Good eye contact. Appropriate in content.     ED Course   Critical Care    Date/Time: 4/16/2023 6:36 AM  Performed by: Kamille Hoyt DO  Authorized by: Kamille Hoyt DO   Direct patient critical care time: 10 minutes  Additional history critical care time: 5 minutes  Ordering / reviewing critical care time: 5 minutes  Documentation critical care time: 5 minutes  Consulting other physicians critical care time: 10 minutes  Total critical care time (exclusive of procedural time) : 35 minutes  Critical care time was exclusive of separately billable procedures and treating other patients and teaching time.  Critical care was necessary to treat or prevent imminent or life-threatening deterioration of the following conditions: hypokalemia.  Critical care was time spent personally by me on the following activities: blood draw for specimens, development of treatment plan with patient or surrogate, discussions with consultants, interpretation of cardiac output measurements, evaluation of patient's response to treatment, examination of patient,  obtaining history from patient or surrogate, ordering and performing treatments and interventions, ordering and review of laboratory studies, ordering and review of radiographic studies, pulse oximetry, re-evaluation of patient's condition and review of old charts.      ED Vital Signs:  Vitals:    04/16/23 0346 04/16/23 0411 04/16/23 0430 04/16/23 0500   BP: (!) 149/74 137/76 128/73 127/70   Pulse: 86 84 87 83   Resp: 19 16 16 16   Temp: 98.6 °F (37 °C)      TempSrc: Oral      SpO2: 100% 100% 100% 99%   Weight: 113.6 kg (250 lb 7.1 oz)       04/16/23 0513 04/16/23 0530 04/16/23 0600 04/16/23 0630   BP:  128/76 135/74 131/73   Pulse: 88 94 83 84   Resp:  16 16 15   Temp:       TempSrc:       SpO2:  100% 99% 99%   Weight:           Abnormal Lab Results:  Labs Reviewed   CBC W/ AUTO DIFFERENTIAL - Abnormal; Notable for the following components:       Result Value    RBC 3.74 (*)     Hemoglobin 10.6 (*)     Hematocrit 31.7 (*)     All other components within normal limits   COMPREHENSIVE METABOLIC PANEL - Abnormal; Notable for the following components:    Potassium 2.6 (*)     Glucose 111 (*)     Total Protein 8.6 (*)     eGFR 54 (*)     All other components within normal limits    Narrative:     K critical result(s) called and verbal readback obtained from Erica Bang RN by MAXINE 04/16/2023 04:57   BASIC METABOLIC PANEL - Abnormal; Notable for the following components:    Potassium 3.0 (*)     Glucose 125 (*)     All other components within normal limits   B-TYPE NATRIURETIC PEPTIDE   MAGNESIUM   TROPONIN I   TSH   PREGNANCY TEST, URINE RAPID    Narrative:     Specimen Source->Urine        All Lab Results:  Results for orders placed or performed during the hospital encounter of 04/16/23   CBC auto differential   Result Value Ref Range    WBC 8.52 3.90 - 12.70 K/uL    RBC 3.74 (L) 4.00 - 5.40 M/uL    Hemoglobin 10.6 (L) 12.0 - 16.0 g/dL    Hematocrit 31.7 (L) 37.0 - 48.5 %    MCV 85 82 - 98 fL    MCH 28.3 27.0 - 31.0  pg    MCHC 33.4 32.0 - 36.0 g/dL    RDW 13.4 11.5 - 14.5 %    Platelets 298 150 - 450 K/uL    MPV 11.4 9.2 - 12.9 fL    Immature Granulocytes 0.2 0.0 - 0.5 %    Gran # (ANC) 4.9 1.8 - 7.7 K/uL    Immature Grans (Abs) 0.02 0.00 - 0.04 K/uL    Lymph # 2.7 1.0 - 4.8 K/uL    Mono # 0.6 0.3 - 1.0 K/uL    Eos # 0.2 0.0 - 0.5 K/uL    Baso # 0.05 0.00 - 0.20 K/uL    nRBC 0 0 /100 WBC    Gran % 57.9 38.0 - 73.0 %    Lymph % 31.9 18.0 - 48.0 %    Mono % 6.6 4.0 - 15.0 %    Eosinophil % 2.8 0.0 - 8.0 %    Basophil % 0.6 0.0 - 1.9 %    Differential Method Automated    Comprehensive metabolic panel   Result Value Ref Range    Sodium 137 136 - 145 mmol/L    Potassium 2.6 (LL) 3.5 - 5.1 mmol/L    Chloride 100 95 - 110 mmol/L    CO2 26 23 - 29 mmol/L    Glucose 111 (H) 70 - 110 mg/dL    BUN 12 6 - 20 mg/dL    Creatinine 1.3 0.5 - 1.4 mg/dL    Calcium 10.0 8.7 - 10.5 mg/dL    Total Protein 8.6 (H) 6.0 - 8.4 g/dL    Albumin 4.1 3.5 - 5.2 g/dL    Total Bilirubin 0.3 0.1 - 1.0 mg/dL    Alkaline Phosphatase 56 55 - 135 U/L    AST 19 10 - 40 U/L    ALT 17 10 - 44 U/L    Anion Gap 11 8 - 16 mmol/L    eGFR 54 (A) >60 mL/min/1.73 m^2   Brain natriuretic peptide   Result Value Ref Range    BNP 18 0 - 99 pg/mL   Magnesium   Result Value Ref Range    Magnesium 2.1 1.6 - 2.6 mg/dL   Troponin I   Result Value Ref Range    Troponin I <0.006 0.000 - 0.026 ng/mL   TSH   Result Value Ref Range    TSH 2.490 0.400 - 4.000 uIU/mL   Pregnancy, urine rapid   Result Value Ref Range    Preg Test, Ur Negative    Basic metabolic panel   Result Value Ref Range    Sodium 140 136 - 145 mmol/L    Potassium 3.0 (L) 3.5 - 5.1 mmol/L    Chloride 105 95 - 110 mmol/L    CO2 26 23 - 29 mmol/L    Glucose 125 (H) 70 - 110 mg/dL    BUN 9 6 - 20 mg/dL    Creatinine 1.0 0.5 - 1.4 mg/dL    Calcium 9.0 8.7 - 10.5 mg/dL    Anion Gap 9 8 - 16 mmol/L    eGFR >60 >60 mL/min/1.73 m^2         Imaging Results:  Imaging Results    None          The EKG was ordered, reviewed, and  independently interpreted by the ED provider.  Interpretation time: 4:23  Rate: 81 BPM  Rhythm: normal sinus rhythm  Interpretation: T wave abnormality, consider inferior ischemia. T wave abnormality, consider anterolateral ischemia. No STEMI.    The EKG was ordered, reviewed, and independently interpreted by the ED provider.  Interpretation time: 08:23  Rate: 71 BPM  Rhythm: normal sinus rhythm  Interpretation: T wave abnormality, consider inferolateral ischemia. No STEMI.  When compared with ECG of 16-APR-2023 04:23, Nonspecific T wave abnormality has replaced inverted T waves in Anterior lead.             The Emergency Provider reviewed the vital signs and test results, which are outlined above.     ED Discussion     5:32 AM: Discussed pt's case with Dr. Geovanni Cardona ([facility]) who recommends recheck labs and EKD after giving K and pt may need second dosing but if she get above 3, if vitals are fine, and otherwise asymptomatic, can replace and follow up with pcp. Send pt out with K.    6:00 AM: Dr. Hoyt transfers care of patient to Dr. Cruz pending repeat lab results.    8:13 AM: Reassessed pt at this time. Discussed with pt all pertinent ED information and results. Discussed pt dx and plan of tx. Gave pt all f/u and return to the ED instructions. All questions and concerns were addressed at this time. Pt expresses understanding of information and instructions, and is comfortable with plan to discharge. Pt is stable for discharge.    I discussed with patient and/or family/caretaker that evaluation in the ED does not suggest any emergent or life threatening medical conditions requiring immediate intervention beyond what was provided in the ED, and I believe patient is safe for discharge.  Regardless, an unremarkable evaluation in the ED does not preclude the development or presence of a serious of life threatening condition. As such, patient was instructed to return immediately for any worsening  or change in current symptoms.           ED Course as of 04/16/23 1558   Sun Apr 16, 2023   0427 EKG shows normal sinus rhythm with a rate of 81.  RI interval 178.  QRS 88.  .  T-wave inversions in the anterolateral and inferior leads there are nonspecific and present on previous EKG.  No STEMI.   [NF]      ED Course User Index  [NF] Kamille Hoyt DO     Medical Decision Making:   History:   Old Medical Records: I decided to obtain old medical records.  Clinical Tests:   Lab Tests: Ordered and Reviewed  Medical Tests: Ordered and Reviewed         ED Medication(s):  Medications   potassium chloride 10 mEq in 100 mL IVPB (0 mEq Intravenous Stopped 4/16/23 0731)   magnesium sulfate 2g in water 50mL IVPB (premix) (0 g Intravenous Stopped 4/16/23 0732)   potassium chloride SA CR tablet 40 mEq (40 mEq Oral Given 4/16/23 0514)       Discharge Medication List as of 4/16/2023  8:49 AM        START taking these medications    Details   potassium chloride 10% (KAYCIEL) 20 mEq/15 mL oral solution Take 15 mLs (20 mEq total) by mouth once daily., Starting Sun 4/16/2023, Print              Follow-up Information       Sofia Abdul MD In 2 days.    Specialty: Family Medicine  Contact information:  [address]  [place] [address] [phone]               [facility]    Specialty: Emergency Medicine  Why: As needed, If symptoms worsen  Contact information:  [address] [facility] [address] [phone]                               Scribe Attestation:   Scribe #1: I performed the above scribed service and the documentation accurately describes the services I performed. I attest to the accuracy of the note.     Attending:   Physician Attestation Statement for Scribe #1: I, Kamille Hoyt DO, personally performed the services described in this documentation, as scribed by Efren Bang, in my presence, and it is both accurate and complete.       Scribe Attestation:    Scribe #2: I performed the above scribed service and the documentation accurately describes the services I performed. I attest to the accuracy of the note.    Attending Attestation:           Physician Attestation for Scribe:    Physician Attestation Statement for Scribe #2: I, Dang Cruz MD, reviewed documentation, as scribed by Elgin Hendrix in my presence, and it is both accurate and complete. I also acknowledge and confirm the content of the note done by Nacho #1.         Clinical Impression       ICD-10-CM ICD-9-CM   1. Leg swelling  M79.89 729.81   2. Swelling  R60.9 782.3   3. Hypokalemia  E87.6 276.8       Disposition:   Disposition: Discharged  Condition: Stable       Dang Cruz MD  04/16/23 5734

## 2023-04-16 NOTE — ED NOTES
Pt ambulated to bathroom with no assist states that she is feeling better and the swelling has started to go down in her legs. Pts AAOX4, NAD, cardiac monitor, NIBP, pulse ox reconnected to patient, pt updated on next steps of process to include a redraw of labs to assess K+, pt verbalized understanding. Care continued no further questions or concerns from pt wctm for further plan of care, new orders, and assessment.

## 2023-04-17 ENCOUNTER — HOSPITAL ENCOUNTER (EMERGENCY)
Facility: HOSPITAL | Age: 38
Discharge: HOME | End: 2023-04-17
Attending: EMERGENCY MEDICINE
Payer: COMMERCIAL

## 2023-04-17 VITALS
HEART RATE: 91 BPM | TEMPERATURE: 99 F | RESPIRATION RATE: 20 BRPM | WEIGHT: 252.19 LBS | SYSTOLIC BLOOD PRESSURE: 125 MMHG | DIASTOLIC BLOOD PRESSURE: 67 MMHG | OXYGEN SATURATION: 100 % | BODY MASS INDEX: 41.97 KG/M2

## 2023-04-17 DIAGNOSIS — R60.9 SWELLING: ICD-10-CM

## 2023-04-17 DIAGNOSIS — M79.89 LEG SWELLING: Primary | ICD-10-CM

## 2023-04-17 DIAGNOSIS — E87.6 HYPOKALEMIA: ICD-10-CM

## 2023-04-17 LAB
ALBUMIN SERPL BCP-MCNC: 4 G/DL (ref 3.5–5.2)
ALP SERPL-CCNC: 55 U/L (ref 55–135)
ALT SERPL W/O P-5'-P-CCNC: 17 U/L (ref 10–44)
ANION GAP SERPL CALC-SCNC: 12 MMOL/L (ref 8–16)
AST SERPL-CCNC: 20 U/L (ref 10–40)
BACTERIA #/AREA URNS HPF: NORMAL /HPF
BASOPHILS # BLD AUTO: 0.04 K/UL (ref 0–0.2)
BASOPHILS NFR BLD: 0.5 % (ref 0–1.9)
BILIRUB SERPL-MCNC: 0.4 MG/DL (ref 0.1–1)
BILIRUB UR QL STRIP: NEGATIVE
BUN SERPL-MCNC: 13 MG/DL (ref 6–20)
CALCIUM SERPL-MCNC: 9.2 MG/DL (ref 8.7–10.5)
CHLORIDE SERPL-SCNC: 101 MMOL/L (ref 95–110)
CLARITY UR: CLEAR
CO2 SERPL-SCNC: 25 MMOL/L (ref 23–29)
COLOR UR: COLORLESS
CREAT SERPL-MCNC: 1.1 MG/DL (ref 0.5–1.4)
DIFFERENTIAL METHOD: ABNORMAL
EOSINOPHIL # BLD AUTO: 0.3 K/UL (ref 0–0.5)
EOSINOPHIL NFR BLD: 3.8 % (ref 0–8)
ERYTHROCYTE [DISTWIDTH] IN BLOOD BY AUTOMATED COUNT: 14 % (ref 11.5–14.5)
EST. GFR  (NO RACE VARIABLE): >60 ML/MIN/1.73 M^2
GLUCOSE SERPL-MCNC: 89 MG/DL (ref 70–110)
GLUCOSE UR QL STRIP: NEGATIVE
HCT VFR BLD AUTO: 30.8 % (ref 37–48.5)
HGB BLD-MCNC: 10.2 G/DL (ref 12–16)
HGB UR QL STRIP: NEGATIVE
IMM GRANULOCYTES # BLD AUTO: 0.02 K/UL (ref 0–0.04)
IMM GRANULOCYTES NFR BLD AUTO: 0.3 % (ref 0–0.5)
KETONES UR QL STRIP: NEGATIVE
LEUKOCYTE ESTERASE UR QL STRIP: ABNORMAL
LYMPHOCYTES # BLD AUTO: 2.6 K/UL (ref 1–4.8)
LYMPHOCYTES NFR BLD: 33.5 % (ref 18–48)
MAGNESIUM SERPL-MCNC: 2.4 MG/DL (ref 1.6–2.6)
MCH RBC QN AUTO: 27.9 PG (ref 27–31)
MCHC RBC AUTO-ENTMCNC: 33.1 G/DL (ref 32–36)
MCV RBC AUTO: 84 FL (ref 82–98)
MICROSCOPIC COMMENT: NORMAL
MONOCYTES # BLD AUTO: 0.6 K/UL (ref 0.3–1)
MONOCYTES NFR BLD: 8.2 % (ref 4–15)
NEUTROPHILS # BLD AUTO: 4.2 K/UL (ref 1.8–7.7)
NEUTROPHILS NFR BLD: 53.7 % (ref 38–73)
NITRITE UR QL STRIP: NEGATIVE
NRBC BLD-RTO: 0 /100 WBC
PH UR STRIP: 6 [PH] (ref 5–8)
PLATELET # BLD AUTO: 274 K/UL (ref 150–450)
PMV BLD AUTO: 10.6 FL (ref 9.2–12.9)
POTASSIUM SERPL-SCNC: 3 MMOL/L (ref 3.5–5.1)
PROT SERPL-MCNC: 8.5 G/DL (ref 6–8.4)
PROT UR QL STRIP: NEGATIVE
RBC # BLD AUTO: 3.65 M/UL (ref 4–5.4)
SODIUM SERPL-SCNC: 138 MMOL/L (ref 136–145)
SP GR UR STRIP: <1.005 (ref 1–1.03)
SQUAMOUS #/AREA URNS HPF: 6 /HPF
UNIDENT CRYS URNS QL MICRO: NORMAL
URN SPEC COLLECT METH UR: ABNORMAL
UROBILINOGEN UR STRIP-ACNC: NEGATIVE EU/DL
WBC # BLD AUTO: 7.72 K/UL (ref 3.9–12.7)
WBC #/AREA URNS HPF: 2 /HPF (ref 0–5)

## 2023-04-17 PROCEDURE — 85025 COMPLETE CBC W/AUTO DIFF WBC: CPT | Performed by: NURSE PRACTITIONER

## 2023-04-17 PROCEDURE — 93010 ELECTROCARDIOGRAM REPORT: CPT | Mod: ,,, | Performed by: STUDENT IN AN ORGANIZED HEALTH CARE EDUCATION/TRAINING PROGRAM

## 2023-04-17 PROCEDURE — 93005 ELECTROCARDIOGRAM TRACING: CPT

## 2023-04-17 PROCEDURE — 25000003 PHARM REV CODE 250: Performed by: EMERGENCY MEDICINE

## 2023-04-17 PROCEDURE — 83735 ASSAY OF MAGNESIUM: CPT | Performed by: EMERGENCY MEDICINE

## 2023-04-17 PROCEDURE — 99284 EMERGENCY DEPT VISIT MOD MDM: CPT | Mod: 25

## 2023-04-17 PROCEDURE — 63600175 PHARM REV CODE 636 W HCPCS: Performed by: EMERGENCY MEDICINE

## 2023-04-17 PROCEDURE — 80053 COMPREHEN METABOLIC PANEL: CPT | Performed by: NURSE PRACTITIONER

## 2023-04-17 PROCEDURE — 93010 EKG 12-LEAD: ICD-10-PCS | Mod: ,,, | Performed by: STUDENT IN AN ORGANIZED HEALTH CARE EDUCATION/TRAINING PROGRAM

## 2023-04-17 PROCEDURE — 81000 URINALYSIS NONAUTO W/SCOPE: CPT | Performed by: NURSE PRACTITIONER

## 2023-04-17 PROCEDURE — 96375 TX/PRO/DX INJ NEW DRUG ADDON: CPT

## 2023-04-17 PROCEDURE — 96374 THER/PROPH/DIAG INJ IV PUSH: CPT

## 2023-04-17 RX ORDER — POTASSIUM CHLORIDE 20 MEQ/1
40 TABLET, EXTENDED RELEASE ORAL
Status: COMPLETED | OUTPATIENT
Start: 2023-04-17 | End: 2023-04-17

## 2023-04-17 RX ORDER — FUROSEMIDE 10 MG/ML
20 INJECTION INTRAMUSCULAR; INTRAVENOUS
Status: COMPLETED | OUTPATIENT
Start: 2023-04-17 | End: 2023-04-17

## 2023-04-17 RX ORDER — ONDANSETRON 2 MG/ML
4 INJECTION INTRAMUSCULAR; INTRAVENOUS
Status: COMPLETED | OUTPATIENT
Start: 2023-04-17 | End: 2023-04-17

## 2023-04-17 RX ORDER — SPIRONOLACTONE 50 MG/1
50 TABLET, FILM COATED ORAL 2 TIMES DAILY
Qty: 10 TABLET | Refills: 0 | Status: SHIPPED | OUTPATIENT
Start: 2023-04-17 | End: 2023-08-18

## 2023-04-17 RX ADMIN — POTASSIUM CHLORIDE 40 MEQ: 1500 TABLET, EXTENDED RELEASE ORAL at 02:04

## 2023-04-17 RX ADMIN — POTASSIUM CHLORIDE 40 MEQ: 1500 TABLET, EXTENDED RELEASE ORAL at 03:04

## 2023-04-17 RX ADMIN — ONDANSETRON 4 MG: 2 INJECTION INTRAMUSCULAR; INTRAVENOUS at 02:04

## 2023-04-17 RX ADMIN — FUROSEMIDE 20 MG: 10 INJECTION, SOLUTION INTRAMUSCULAR; INTRAVENOUS at 02:04

## 2023-04-17 NOTE — ED PROVIDER NOTES
SCRIBE #1 NOTE: I, Jennifer Choe, am scribing for, and in the presence of, Kamille Hoyt DO. I have scribed the entire note.       History     Chief Complaint   Patient presents with    Leg Swelling     Pt states she was seen here last night for leg swelling but it has not improved and pt states her legs seem to be getting bigger. Pt has hx of HTN.     Review of patient's allergies indicates:  No Known Allergies      History of Present Illness     HPI    2023, 1:50 AM  History obtained from the patient      History of Present Illness: Malgorzata Yoder is a 38 y.o. female patient with a PMHx of hypertension and , hypokalemia with noncompliance who presents to the Emergency Department for evaluation of leg swelling which onset several hours PTA. Symptoms are constant and moderate in severity. Pt was in ED last night for same sxs but notices that swelling is getting worse. She was prescribed potassium chloride at discharge. No associated sxs reported. Patient denies any CP, SOB, fever, and all other sxs at this time. Pt does not have compression stockings at home. No further complaints or concerns at this time.       Arrival mode: Personal vehicle     PCP: Sofia Abdul MD        Past Medical History:  Past Medical History:   Diagnosis Date    Asthma     childhood    Cervical cerclage suture present in second trimester 9/10/2019    12:00. Catina cerclage    H/O incompetent cervix, currently pregnant, second trimester 9/10/2019    History of cerclage, currently pregnant 2019. Late prenatal care at 4 months, incidental finding of membranes in vaginal vault, cerclage placed per [facility] - report requested    History of  delivery, currently pregnant 2019 failed  Cytotec induction of labor at 37 weeks secondary to  severe preeclampsia - [facility] -Dr. Cass Haywood   C/S -(uterine incision not documented) secondary to severe preeclampsia and recurrent late  deceleration with SGA baby 4 lb 15 oz.  Dense fibrotic scar tissue was noted during surgery    History of ovarian cyst     Hypertension     Hypokalemia 2014    Paresthesia and pain of both upper extremities 2019       Past Surgical History:  Past Surgical History:   Procedure Laterality Date    CARPAL TUNNEL RELEASE Bilateral     CERVICAL CERCLAGE N/A 09/10/2019    Procedure: CERCLAGE, CERVIX;  Surgeon: Vandana Smith MD;  Location: [facility] OR;  Service: OB/GYN;  Laterality: N/A;     SECTION       SECTION N/A 2020    Procedure:  SECTION;  Surgeon: Vandana Smith MD;  Location: [facility] L&D;  Service: OB/GYN;  Laterality: N/A;    MOUTH SURGERY      OOPHORECTOMY Right 2010         Family History:  Family History   Problem Relation Age of Onset    Aneurysm Mother     Hypertension Mother     Miscarriages / Stillbirths Mother     Cancer Maternal Aunt         brain tumor    Breast cancer Maternal Grandmother     Cancer Maternal Grandmother     Learning disabilities Daughter     Colon cancer Neg Hx     Ovarian cancer Neg Hx     Uterine cancer Neg Hx     Cervical cancer Neg Hx        Social History:  Social History     Tobacco Use    Smoking status: Never    Smokeless tobacco: Never   Substance and Sexual Activity    Alcohol use: No    Drug use: No    Sexual activity: Yes     Partners: Male     Birth control/protection: None        Review of Systems     Review of Systems   Constitutional:  Negative for fever.   Respiratory:  Negative for shortness of breath.    Cardiovascular:  Positive for leg swelling. Negative for chest pain.      Physical Exam     Initial Vitals [23 0045]   BP Pulse Resp Temp SpO2   134/82 86 19 98.9 °F (37.2 °C) 100 %      MAP       --          Physical Exam  Nursing Notes and Vital Signs Reviewed.  Constitutional: Patient is in no acute distress. Well-developed and well-nourished.  Head: Atraumatic. Normocephalic.  Eyes: PERRL. EOM intact. Conjunctivae are  not pale. No scleral icterus.  ENT: Mucous membranes are moist.   Neck: No JVD.  Cardiovascular: Regular rate. Regular rhythm. No murmurs, rubs, or gallops. Distal pulses are 2+ and symmetric (radial and DP).  Pulmonary/Chest: No respiratory distress. Clear to auscultation bilaterally. No rales.  Abdominal: Soft and non-distended.  There is no tenderness.  No rebound, guarding, or rigidity.   Musculoskeletal: Moves all extremities. No obvious deformities. 1+ pitting edema BLE. No calf tenderness.  Skin: Warm and dry.  Neurological:  Alert, awake, and appropriate.  Normal speech.  No acute focal neurological deficits are appreciated.  Psychiatric: Normal affect. Good eye contact. Appropriate in content.     ED Course   Procedures  ED Vital Signs:  Vitals:    04/17/23 0045 04/17/23 0200 04/17/23 0300 04/17/23 0329   BP: 134/82 124/78 139/73    Pulse: 86 85 97 (!) 112   Resp: 19 16 16    Temp: 98.9 °F (37.2 °C)      TempSrc: Oral      SpO2: 100% 100% 100% 98%   Weight: 114.4 kg (252 lb 3.3 oz)       04/17/23 0330 04/17/23 0331 04/17/23 0403 04/17/23 0508   BP:   134/61 125/67   Pulse: 107 104 94 91   Resp:    20   Temp:    98.9 °F (37.2 °C)   TempSrc:    Oral   SpO2: 99% (!) 92% 99% 100%   Weight:           Abnormal Lab Results:  Labs Reviewed   CBC W/ AUTO DIFFERENTIAL - Abnormal; Notable for the following components:       Result Value    RBC 3.65 (*)     Hemoglobin 10.2 (*)     Hematocrit 30.8 (*)     All other components within normal limits   COMPREHENSIVE METABOLIC PANEL - Abnormal; Notable for the following components:    Potassium 3.0 (*)     Total Protein 8.5 (*)     All other components within normal limits   URINALYSIS, REFLEX TO URINE CULTURE - Abnormal; Notable for the following components:    Color, UA Colorless (*)     Specific Gravity, UA <1.005 (*)     Leukocytes, UA 1+ (*)     All other components within normal limits    Narrative:     Specimen Source->Urine   MAGNESIUM   URINALYSIS MICROSCOPIC     Narrative:     Specimen Source->Urine        All Lab Results:  Results for orders placed or performed during the hospital encounter of 04/17/23   CBC auto differential   Result Value Ref Range    WBC 7.72 3.90 - 12.70 K/uL    RBC 3.65 (L) 4.00 - 5.40 M/uL    Hemoglobin 10.2 (L) 12.0 - 16.0 g/dL    Hematocrit 30.8 (L) 37.0 - 48.5 %    MCV 84 82 - 98 fL    MCH 27.9 27.0 - 31.0 pg    MCHC 33.1 32.0 - 36.0 g/dL    RDW 14.0 11.5 - 14.5 %    Platelets 274 150 - 450 K/uL    MPV 10.6 9.2 - 12.9 fL    Immature Granulocytes 0.3 0.0 - 0.5 %    Gran # (ANC) 4.2 1.8 - 7.7 K/uL    Immature Grans (Abs) 0.02 0.00 - 0.04 K/uL    Lymph # 2.6 1.0 - 4.8 K/uL    Mono # 0.6 0.3 - 1.0 K/uL    Eos # 0.3 0.0 - 0.5 K/uL    Baso # 0.04 0.00 - 0.20 K/uL    nRBC 0 0 /100 WBC    Gran % 53.7 38.0 - 73.0 %    Lymph % 33.5 18.0 - 48.0 %    Mono % 8.2 4.0 - 15.0 %    Eosinophil % 3.8 0.0 - 8.0 %    Basophil % 0.5 0.0 - 1.9 %    Differential Method Automated    Comprehensive metabolic panel   Result Value Ref Range    Sodium 138 136 - 145 mmol/L    Potassium 3.0 (L) 3.5 - 5.1 mmol/L    Chloride 101 95 - 110 mmol/L    CO2 25 23 - 29 mmol/L    Glucose 89 70 - 110 mg/dL    BUN 13 6 - 20 mg/dL    Creatinine 1.1 0.5 - 1.4 mg/dL    Calcium 9.2 8.7 - 10.5 mg/dL    Total Protein 8.5 (H) 6.0 - 8.4 g/dL    Albumin 4.0 3.5 - 5.2 g/dL    Total Bilirubin 0.4 0.1 - 1.0 mg/dL    Alkaline Phosphatase 55 55 - 135 U/L    AST 20 10 - 40 U/L    ALT 17 10 - 44 U/L    Anion Gap 12 8 - 16 mmol/L    eGFR >60 >60 mL/min/1.73 m^2   Urinalysis, Reflex to Urine Culture Urine, Clean Catch    Specimen: Urine   Result Value Ref Range    Specimen UA Urine, Clean Catch     Color, UA Colorless (A) Yellow, Straw, [place]    Appearance, UA Clear Clear    pH, UA 6.0 5.0 - 8.0    Specific Gravity, UA <1.005 (A) 1.005 - 1.030    Protein, UA Negative Negative    Glucose, UA Negative Negative    Ketones, UA Negative Negative    Bilirubin (UA) Negative Negative    Occult Blood UA Negative  Negative    Nitrite, UA Negative Negative    Urobilinogen, UA Negative <2.0 EU/dL    Leukocytes, UA 1+ (A) Negative   Magnesium   Result Value Ref Range    Magnesium 2.4 1.6 - 2.6 mg/dL   Urinalysis Microscopic   Result Value Ref Range    WBC, UA 2 0 - 5 /hpf    Bacteria Rare None-Occ /hpf    Squam Epithel, UA 6 /hpf    Unclass Clari UA Rare None-Moderate    Microscopic Comment SEE COMMENT         Imaging Results:  Imaging Results    None        The EKG was ordered, reviewed, and independently interpreted by the ED provider.  Interpretation time: 2:00  Rate: 80 BPM  Rhythm: normal sinus rhythm  Interpretation: T wave inversion in infero-lateral leads  Prolonged QT. No STEMI.             The Emergency Provider reviewed the vital signs and test results, which are outlined above.     ED Discussion     ED Course as of 04/17/23 0536   Mon Apr 17, 2023   0337 Put out 1100 cc of urine after IV Lasix. [NF]   0527 38-year-old female with a history of hypertension and hypokalemia presents with edema to her lower extremities.  She was seen and evaluated last night for similar symptoms.  Found to be hypokalemic at she was found to be hypokalemic at that time and given replacement p.o. and IV.  She admits to noncompliance with her home replacement and was given a prescription.  She presents today with worsening swelling in her legs.  Potassium 3.0.  She was given 80 mEq p.o. into separate 40 mEq doses as well as a IV dose of Lasix 20 mg.  She diuresed well and tolerated a diet.  She agreed to take her home potassium supplements and understands that hypokalemia can cause cardiac arrest which leads to death and permanent disability.  We will discharge her with a 5 day course of a potassium-sparing diuretic.  She would no signs of cellulitis, her skin had no erythema warmth or induration.  She is afebrile with no leukocytosis.  She would no signs of CHF on lung examination.  Both of her lower extremities were equally swollen with no  unilateral swelling or calf tenderness therefore DVT unlikely. [NF]      ED Course User Index  [NF] Kamille Hoyt DO     5:36 AM: Reassessed pt at this time. Discussed with pt all pertinent ED information and results. Discussed pt dx and plan of tx. Gave pt all f/u and return to the ED instructions. All questions and concerns were addressed at this time. Pt expresses understanding of information and instructions, and is comfortable with plan to discharge. Pt is stable for discharge.    I discussed with patient and/or family/caretaker that evaluation in the ED does not suggest any emergent or life threatening medical conditions requiring immediate intervention beyond what was provided in the ED, and I believe patient is safe for discharge.  Regardless, an unremarkable evaluation in the ED does not preclude the development or presence of a serious of life threatening condition. As such, patient was instructed to return immediately for any worsening or change in current symptoms.     Medical Decision Making:   Clinical Tests:   Lab Tests: Ordered and Reviewed  Medical Tests: Ordered and Reviewed         ED Medication(s):  Medications   potassium chloride SA CR tablet 40 mEq (40 mEq Oral Given 4/17/23 0231)   furosemide injection 20 mg (20 mg Intravenous Given 4/17/23 0234)   potassium chloride SA CR tablet 40 mEq (40 mEq Oral Given 4/17/23 0325)   ondansetron injection 4 mg (4 mg Intravenous Given 4/17/23 0233)       Discharge Medication List as of 4/17/2023  3:55 AM        START taking these medications    Details   spironolactone (ALDACTONE) 50 MG tablet Take 1 tablet (50 mg total) by mouth 2 (two) times daily. for 5 days, Starting Mon 4/17/2023, Until Sat 4/22/2023, Print              Follow-up Information       Sofia Abdul MD On 4/17/2023.    Specialty: Family Medicine  Contact information:  [id number] [address] [facility]  [place] [address]  [phone]                                 Scribe Attestation:   Nacho  #1: I performed the above scribed service and the documentation accurately describes the services I performed. I attest to the accuracy of the note.     Attending:   Physician Attestation Statement for Scribe #1: I, Kamille Hoyt DO, personally performed the services described in this documentation, as scribed by Jennifer Choe, in my presence, and it is both accurate and complete.           Clinical Impression       ICD-10-CM ICD-9-CM   1. Leg swelling  M79.89 729.81   2. Swelling  R60.9 782.3   3. Hypokalemia  E87.6 276.8       Disposition:   Disposition: Discharged  Condition: Stable      Kamille Hoyt DO  04/17/23 0603

## 2023-04-18 ENCOUNTER — HOSPITAL ENCOUNTER (OUTPATIENT)
Dept: RADIOLOGY | Facility: HOSPITAL | Age: 38
Discharge: HOME | End: 2023-04-18
Attending: FAMILY MEDICINE
Payer: COMMERCIAL

## 2023-04-18 ENCOUNTER — OFFICE VISIT (OUTPATIENT)
Dept: INTERNAL MEDICINE | Facility: CLINIC | Age: 38
End: 2023-04-18
Payer: COMMERCIAL

## 2023-04-18 VITALS
RESPIRATION RATE: 20 BRPM | SYSTOLIC BLOOD PRESSURE: 126 MMHG | WEIGHT: 245.56 LBS | BODY MASS INDEX: 40.91 KG/M2 | HEIGHT: 65 IN | OXYGEN SATURATION: 99 % | HEART RATE: 85 BPM | DIASTOLIC BLOOD PRESSURE: 78 MMHG

## 2023-04-18 DIAGNOSIS — R60.0 PEDAL EDEMA: ICD-10-CM

## 2023-04-18 DIAGNOSIS — I10 ESSENTIAL HYPERTENSION: ICD-10-CM

## 2023-04-18 DIAGNOSIS — E66.01 MORBID OBESITY WITH BMI OF 40.0-44.9, ADULT: ICD-10-CM

## 2023-04-18 DIAGNOSIS — R60.0 PEDAL EDEMA: Primary | ICD-10-CM

## 2023-04-18 DIAGNOSIS — E87.6 HYPOKALEMIA: ICD-10-CM

## 2023-04-18 PROCEDURE — 3008F BODY MASS INDEX DOCD: CPT | Mod: CPTII,S$GLB,, | Performed by: FAMILY MEDICINE

## 2023-04-18 PROCEDURE — 1159F PR MEDICATION LIST DOCUMENTED IN MEDICAL RECORD: ICD-10-PCS | Mod: CPTII,S$GLB,, | Performed by: FAMILY MEDICINE

## 2023-04-18 PROCEDURE — 1160F PR REVIEW ALL MEDS BY PRESCRIBER/CLIN PHARMACIST DOCUMENTED: ICD-10-PCS | Mod: CPTII,S$GLB,, | Performed by: FAMILY MEDICINE

## 2023-04-18 PROCEDURE — 71046 XR CHEST PA AND LATERAL: ICD-10-PCS | Mod: 26,,, | Performed by: RADIOLOGY

## 2023-04-18 PROCEDURE — 99999 PR PBB SHADOW E&M-EST. PATIENT-LVL V: ICD-10-PCS | Mod: PBBFAC,,, | Performed by: FAMILY MEDICINE

## 2023-04-18 PROCEDURE — 3008F PR BODY MASS INDEX (BMI) DOCUMENTED: ICD-10-PCS | Mod: CPTII,S$GLB,, | Performed by: FAMILY MEDICINE

## 2023-04-18 PROCEDURE — 99999 PR PBB SHADOW E&M-EST. PATIENT-LVL V: CPT | Mod: PBBFAC,,, | Performed by: FAMILY MEDICINE

## 2023-04-18 PROCEDURE — 71046 X-RAY EXAM CHEST 2 VIEWS: CPT | Mod: TC

## 2023-04-18 PROCEDURE — 71046 X-RAY EXAM CHEST 2 VIEWS: CPT | Mod: 26,,, | Performed by: RADIOLOGY

## 2023-04-18 PROCEDURE — 1159F MED LIST DOCD IN RCRD: CPT | Mod: CPTII,S$GLB,, | Performed by: FAMILY MEDICINE

## 2023-04-18 PROCEDURE — 3044F PR MOST RECENT HEMOGLOBIN A1C LEVEL <7.0%: ICD-10-PCS | Mod: CPTII,S$GLB,, | Performed by: FAMILY MEDICINE

## 2023-04-18 PROCEDURE — 3078F DIAST BP <80 MM HG: CPT | Mod: CPTII,S$GLB,, | Performed by: FAMILY MEDICINE

## 2023-04-18 PROCEDURE — 1160F RVW MEDS BY RX/DR IN RCRD: CPT | Mod: CPTII,S$GLB,, | Performed by: FAMILY MEDICINE

## 2023-04-18 PROCEDURE — 99214 OFFICE O/P EST MOD 30 MIN: CPT | Mod: S$GLB,,, | Performed by: FAMILY MEDICINE

## 2023-04-18 PROCEDURE — 3074F SYST BP LT 130 MM HG: CPT | Mod: CPTII,S$GLB,, | Performed by: FAMILY MEDICINE

## 2023-04-18 PROCEDURE — 99214 PR OFFICE/OUTPT VISIT, EST, LEVL IV, 30-39 MIN: ICD-10-PCS | Mod: S$GLB,,, | Performed by: FAMILY MEDICINE

## 2023-04-18 PROCEDURE — 3074F PR MOST RECENT SYSTOLIC BLOOD PRESSURE < 130 MM HG: ICD-10-PCS | Mod: CPTII,S$GLB,, | Performed by: FAMILY MEDICINE

## 2023-04-18 PROCEDURE — 3044F HG A1C LEVEL LT 7.0%: CPT | Mod: CPTII,S$GLB,, | Performed by: FAMILY MEDICINE

## 2023-04-18 PROCEDURE — 3078F PR MOST RECENT DIASTOLIC BLOOD PRESSURE < 80 MM HG: ICD-10-PCS | Mod: CPTII,S$GLB,, | Performed by: FAMILY MEDICINE

## 2023-04-18 NOTE — PROGRESS NOTES
"Subjective:       Patient ID: Malgorzata Yoder is a 38 y.o. female.    Chief Complaint: Follow-up    38-year-old  female patient with Patient Active Problem List:     Essential hypertension     Hypokalemia     Carpal tunnel syndrome on both sides     Morbid obesity with BMI of 40.0-44.9, adult  Here for follow-up from ER secondary to leg swelling for the past 2 days for which patient went twice and had complete evaluation done but normal workup noted other than extremely low potassium for which patient has been supplemented with potassium and magnesium and was sent home on Aldactone for 5 days  Denies any chest pain or difficulty breathing with palpitations but started having swelling to both the legs and has not been adding extra salt in  Her diet  Never had similar symptoms in the past        Review of Systems   Constitutional:  Negative for fatigue.   Respiratory:  Negative for shortness of breath.    Cardiovascular:  Positive for leg swelling. Negative for palpitations.       /78 (BP Location: Left arm, Patient Position: Sitting, BP Method: Large (Manual))   Pulse 85   Resp 20   Ht 5' 5" (1.651 m)   Wt 111.4 kg (245 lb 9.5 oz)   LMP 03/29/2023   SpO2 99%   BMI 40.87 kg/m²   Objective:      Physical Exam  Constitutional:       Appearance: She is well-developed.   HENT:      Head: Normocephalic and atraumatic.   Cardiovascular:      Rate and Rhythm: Normal rate and regular rhythm.      Heart sounds: Normal heart sounds. No murmur heard.  Pulmonary:      Effort: Pulmonary effort is normal. No respiratory distress.      Breath sounds: Normal breath sounds. No wheezing.   Chest:      Chest wall: No tenderness.   Abdominal:      General: Bowel sounds are normal.      Palpations: Abdomen is soft.      Tenderness: There is no abdominal tenderness.   Skin:     General: Skin is warm and dry.      Findings: No rash.   Neurological:      Mental Status: She is alert and oriented to person, " place, and time.   Psychiatric:         Mood and Affect: Mood normal.       Admission on 04/17/2023, Discharged on 04/17/2023   Component Date Value Ref Range Status    WBC 04/17/2023 7.72  3.90 - 12.70 K/uL Final    RBC 04/17/2023 3.65 (L)  4.00 - 5.40 M/uL Final    Hemoglobin 04/17/2023 10.2 (L)  12.0 - 16.0 g/dL Final    Hematocrit 04/17/2023 30.8 (L)  37.0 - 48.5 % Final    MCV 04/17/2023 84  82 - 98 fL Final    MCH 04/17/2023 27.9  27.0 - 31.0 pg Final    MCHC 04/17/2023 33.1  32.0 - 36.0 g/dL Final    RDW 04/17/2023 14.0  11.5 - 14.5 % Final    Platelets 04/17/2023 274  150 - 450 K/uL Final    MPV 04/17/2023 10.6  9.2 - 12.9 fL Final    Immature Granulocytes 04/17/2023 0.3  0.0 - 0.5 % Final    Gran # (ANC) 04/17/2023 4.2  1.8 - 7.7 K/uL Final    Immature Grans (Abs) 04/17/2023 0.02  0.00 - 0.04 K/uL Final    Comment: Mild elevation in immature granulocytes is non specific and   can be seen in a variety of conditions including stress response,   acute inflammation, trauma and pregnancy. Correlation with other   laboratory and clinical findings is essential.      Lymph # 04/17/2023 2.6  1.0 - 4.8 K/uL Final    Mono # 04/17/2023 0.6  0.3 - 1.0 K/uL Final    Eos # 04/17/2023 0.3  0.0 - 0.5 K/uL Final    Baso # 04/17/2023 0.04  0.00 - 0.20 K/uL Final    nRBC 04/17/2023 0  0 /100 WBC Final    Gran % 04/17/2023 53.7  38.0 - 73.0 % Final    Lymph % 04/17/2023 33.5  18.0 - 48.0 % Final    Mono % 04/17/2023 8.2  4.0 - 15.0 % Final    Eosinophil % 04/17/2023 3.8  0.0 - 8.0 % Final    Basophil % 04/17/2023 0.5  0.0 - 1.9 % Final    Differential Method 04/17/2023 Automated   Final    Sodium 04/17/2023 138  136 - 145 mmol/L Final    Potassium 04/17/2023 3.0 (L)  3.5 - 5.1 mmol/L Final    Chloride 04/17/2023 101  95 - 110 mmol/L Final    CO2 04/17/2023 25  23 - 29 mmol/L Final    Glucose 04/17/2023 89  70 - 110 mg/dL Final    BUN 04/17/2023 13  6 - 20 mg/dL Final    Creatinine 04/17/2023 1.1  0.5 - 1.4 mg/dL Final     Calcium 04/17/2023 9.2  8.7 - 10.5 mg/dL Final    Total Protein 04/17/2023 8.5 (H)  6.0 - 8.4 g/dL Final    Albumin 04/17/2023 4.0  3.5 - 5.2 g/dL Final    Total Bilirubin 04/17/2023 0.4  0.1 - 1.0 mg/dL Final    Comment: For infants and newborns, interpretation of results should be based  on gestational age, weight and in agreement with clinical  observations.    Premature Infant recommended reference ranges:  Up to 24 hours.............<8.0 mg/dL  Up to 48 hours............<12.0 mg/dL  3-5 days..................<15.0 mg/dL  6-29 days.................<15.0 mg/dL      Alkaline Phosphatase 04/17/2023 55  55 - 135 U/L Final    AST 04/17/2023 20  10 - 40 U/L Final    ALT 04/17/2023 17  10 - 44 U/L Final    Anion Gap 04/17/2023 12  8 - 16 mmol/L Final    eGFR 04/17/2023 >60  >60 mL/min/1.73 m^2 Final    Specimen UA 04/17/2023 Urine, Clean Catch   Final    Color, UA 04/17/2023 Colorless (A)  Yellow, Straw, [place] Final    Appearance, UA 04/17/2023 Clear  Clear Final    pH, UA 04/17/2023 6.0  5.0 - 8.0 Final    Specific Gravity, UA 04/17/2023 <1.005 (A)  1.005 - 1.030 Final    Protein, UA 04/17/2023 Negative  Negative Final    Comment: Recommend a 24 hour urine protein or a urine   protein/creatinine ratio if globulin induced proteinuria is  clinically suspected.      Glucose, UA 04/17/2023 Negative  Negative Final    Ketones, UA 04/17/2023 Negative  Negative Final    Bilirubin (UA) 04/17/2023 Negative  Negative Final    Occult Blood UA 04/17/2023 Negative  Negative Final    Nitrite, UA 04/17/2023 Negative  Negative Final    Urobilinogen, UA 04/17/2023 Negative  <2.0 EU/dL Final    Leukocytes, UA 04/17/2023 1+ (A)  Negative Final    Magnesium 04/17/2023 2.4  1.6 - 2.6 mg/dL Final    WBC, UA 04/17/2023 2  0 - 5 /hpf Final    Bacteria 04/17/2023 Rare  None-Occ /hpf Final    Squam Epithel, UA 04/17/2023 6  /hpf Final    Unclass Clari UA 04/17/2023 Rare  None-Moderate Final    Microscopic Comment 04/17/2023 SEE COMMENT   Final     Comment: Other formed elements not mentioned in the report are not   present in the microscopic examination.      Admission on 04/16/2023, Discharged on 04/16/2023   Component Date Value Ref Range Status    WBC 04/16/2023 8.52  3.90 - 12.70 K/uL Final    RBC 04/16/2023 3.74 (L)  4.00 - 5.40 M/uL Final    Hemoglobin 04/16/2023 10.6 (L)  12.0 - 16.0 g/dL Final    Hematocrit 04/16/2023 31.7 (L)  37.0 - 48.5 % Final    MCV 04/16/2023 85  82 - 98 fL Final    MCH 04/16/2023 28.3  27.0 - 31.0 pg Final    MCHC 04/16/2023 33.4  32.0 - 36.0 g/dL Final    RDW 04/16/2023 13.4  11.5 - 14.5 % Final    Platelets 04/16/2023 298  150 - 450 K/uL Final    MPV 04/16/2023 11.4  9.2 - 12.9 fL Final    Immature Granulocytes 04/16/2023 0.2  0.0 - 0.5 % Final    Gran # (ANC) 04/16/2023 4.9  1.8 - 7.7 K/uL Final    Immature Grans (Abs) 04/16/2023 0.02  0.00 - 0.04 K/uL Final    Comment: Mild elevation in immature granulocytes is non specific and   can be seen in a variety of conditions including stress response,   acute inflammation, trauma and pregnancy. Correlation with other   laboratory and clinical findings is essential.      Lymph # 04/16/2023 2.7  1.0 - 4.8 K/uL Final    Mono # 04/16/2023 0.6  0.3 - 1.0 K/uL Final    Eos # 04/16/2023 0.2  0.0 - 0.5 K/uL Final    Baso # 04/16/2023 0.05  0.00 - 0.20 K/uL Final    nRBC 04/16/2023 0  0 /100 WBC Final    Gran % 04/16/2023 57.9  38.0 - 73.0 % Final    Lymph % 04/16/2023 31.9  18.0 - 48.0 % Final    Mono % 04/16/2023 6.6  4.0 - 15.0 % Final    Eosinophil % 04/16/2023 2.8  0.0 - 8.0 % Final    Basophil % 04/16/2023 0.6  0.0 - 1.9 % Final    Differential Method 04/16/2023 Automated   Final    Sodium 04/16/2023 137  136 - 145 mmol/L Final    Potassium 04/16/2023 2.6 (LL)  3.5 - 5.1 mmol/L Final    Comment: K critical result(s) called and verbal readback obtained from Erica Bang RN by MAXINE 04/16/2023 04:57      Chloride 04/16/2023 100  95 - 110 mmol/L Final    CO2 04/16/2023 26  23 - 29  mmol/L Final    Glucose 04/16/2023 111 (H)  70 - 110 mg/dL Final    BUN 04/16/2023 12  6 - 20 mg/dL Final    Creatinine 04/16/2023 1.3  0.5 - 1.4 mg/dL Final    Calcium 04/16/2023 10.0  8.7 - 10.5 mg/dL Final    Total Protein 04/16/2023 8.6 (H)  6.0 - 8.4 g/dL Final    Albumin 04/16/2023 4.1  3.5 - 5.2 g/dL Final    Total Bilirubin 04/16/2023 0.3  0.1 - 1.0 mg/dL Final    Comment: For infants and newborns, interpretation of results should be based  on gestational age, weight and in agreement with clinical  observations.    Premature Infant recommended reference ranges:  Up to 24 hours.............<8.0 mg/dL  Up to 48 hours............<12.0 mg/dL  3-5 days..................<15.0 mg/dL  6-29 days.................<15.0 mg/dL      Alkaline Phosphatase 04/16/2023 56  55 - 135 U/L Final    AST 04/16/2023 19  10 - 40 U/L Final    ALT 04/16/2023 17  10 - 44 U/L Final    Anion Gap 04/16/2023 11  8 - 16 mmol/L Final    eGFR 04/16/2023 54 (A)  >60 mL/min/1.73 m^2 Final    BNP 04/16/2023 18  0 - 99 pg/mL Final    Values of less than 100 pg/ml are consistent with non-CHF populations.    Magnesium 04/16/2023 2.1  1.6 - 2.6 mg/dL Final    Troponin I 04/16/2023 <0.006  0.000 - 0.026 ng/mL Final    Comment: The reference interval for Troponin I represents the 99th percentile   cutoff   for our facility and is consistent with 3rd generation assay   performance.      TSH 04/16/2023 2.490  0.400 - 4.000 uIU/mL Final    Preg Test, Ur 04/16/2023 Negative   Final    Sodium 04/16/2023 140  136 - 145 mmol/L Final    Potassium 04/16/2023 3.0 (L)  3.5 - 5.1 mmol/L Final    Chloride 04/16/2023 105  95 - 110 mmol/L Final    CO2 04/16/2023 26  23 - 29 mmol/L Final    Glucose 04/16/2023 125 (H)  70 - 110 mg/dL Final    BUN 04/16/2023 9  6 - 20 mg/dL Final    Creatinine 04/16/2023 1.0  0.5 - 1.4 mg/dL Final    Calcium 04/16/2023 9.0  8.7 - 10.5 mg/dL Final    Anion Gap 04/16/2023 9  8 - 16 mmol/L Final    eGFR 04/16/2023 >60  >60 mL/min/1.73 m^2  Final     Results for orders placed or performed during the hospital encounter of 04/17/23   EKG 12-lead    Collection Time: 04/17/23  2:00 AM    Narrative    Test Reason : R60.9,    Vent. Rate : 080 BPM     Atrial Rate : 080 BPM     P-R Int : 172 ms          QRS Dur : 088 ms      QT Int : 406 ms       P-R-T Axes : 062 060 -60 degrees     QTc Int : 468 ms    Normal sinus rhythm  T wave abnormality, consider inferolateral ischemia  Prolonged QT  Abnormal ECG  When compared with ECG of 16-APR-2023 08:23,  No significant change was found  Confirmed by Mikaela Coronado MD ([id number]) on 4/17/2023 1:26:26 PM    Referred By: ORLANDO   SELF           Confirmed By:Mikaela Coronado MD      Assessment/Plan:   1. Pedal edema  -     Ambulatory referral/consult to Cardiology; Future; Expected date: 04/25/2023  -     X-Ray Chest PA And Lateral; Future; Expected date: 04/18/2023  -     NERI; Future; Expected date: 04/18/2023  -     D-DIMER, QUANTITATIVE; Future; Expected date: 04/18/2023  Will check further labs  Will refer to Cardiology for further evaluation  Reviewed recent labs showing low potassium for which patient is supplemented with potassium supplements in currently taking Aldactone    2. Essential hypertension  Overview:   Diagnosed in 2012 following 1st delivery.  Lisinopril 20 mg HCTZ 25 mg, 1 tab q.d. Since 2015.   07 /02/2019 Changed to labetalol 100 mg twice a day, CMP PCR added to prenatal lab for baseline.  Advised daily baby aspirin at 12-14 weeks  Weekly ultrasounds at 32 weeks    Blood pressure is stable currently on amlodipine 10 mg and labetalol 200 mg twice daily    Orders:  -     Ambulatory referral/consult to Cardiology; Future; Expected date: 04/25/2023    3. Hypokalemia  Currently taking potassium supplements 20 meq daily  Patient to discuss regarding EKG changes secondary to low potassium with Cardiology  Clinically asymptomatic    4. Morbid obesity with BMI of 40.0-44.9, adult  Lifestyle modifications  recommended to lose weight with BMI 40

## 2023-04-19 DIAGNOSIS — M79.89 LEG SWELLING: Primary | ICD-10-CM

## 2023-04-19 DIAGNOSIS — R79.89 POSITIVE D DIMER: ICD-10-CM

## 2023-04-21 ENCOUNTER — HOSPITAL ENCOUNTER (OUTPATIENT)
Dept: RADIOLOGY | Facility: HOSPITAL | Age: 38
Discharge: HOME | End: 2023-04-21
Attending: FAMILY MEDICINE
Payer: COMMERCIAL

## 2023-04-21 DIAGNOSIS — R79.89 POSITIVE D DIMER: ICD-10-CM

## 2023-04-21 DIAGNOSIS — M79.89 LEG SWELLING: ICD-10-CM

## 2023-04-21 PROCEDURE — 93970 EXTREMITY STUDY: CPT | Mod: TC

## 2023-04-21 PROCEDURE — 93970 US LOWER EXTREMITY VEINS BILATERAL: ICD-10-PCS | Mod: 26,,, | Performed by: STUDENT IN AN ORGANIZED HEALTH CARE EDUCATION/TRAINING PROGRAM

## 2023-04-21 PROCEDURE — 93970 EXTREMITY STUDY: CPT | Mod: 26,,, | Performed by: STUDENT IN AN ORGANIZED HEALTH CARE EDUCATION/TRAINING PROGRAM

## 2023-04-26 ENCOUNTER — OFFICE VISIT (OUTPATIENT)
Dept: CARDIOLOGY | Facility: CLINIC | Age: 38
End: 2023-04-26
Payer: COMMERCIAL

## 2023-04-26 VITALS
SYSTOLIC BLOOD PRESSURE: 140 MMHG | BODY MASS INDEX: 39.67 KG/M2 | HEART RATE: 80 BPM | DIASTOLIC BLOOD PRESSURE: 90 MMHG | WEIGHT: 238.13 LBS | HEIGHT: 65 IN | OXYGEN SATURATION: 98 %

## 2023-04-26 DIAGNOSIS — M79.89 LEG SWELLING: ICD-10-CM

## 2023-04-26 DIAGNOSIS — R60.0 PEDAL EDEMA: ICD-10-CM

## 2023-04-26 DIAGNOSIS — E66.01 MORBID OBESITY WITH BMI OF 40.0-44.9, ADULT: ICD-10-CM

## 2023-04-26 DIAGNOSIS — I10 ESSENTIAL HYPERTENSION: Primary | ICD-10-CM

## 2023-04-26 DIAGNOSIS — E87.6 HYPOKALEMIA: ICD-10-CM

## 2023-04-26 PROCEDURE — 3044F HG A1C LEVEL LT 7.0%: CPT | Mod: CPTII,S$GLB,, | Performed by: INTERNAL MEDICINE

## 2023-04-26 PROCEDURE — 99204 OFFICE O/P NEW MOD 45 MIN: CPT | Mod: S$GLB,,, | Performed by: INTERNAL MEDICINE

## 2023-04-26 PROCEDURE — 3077F SYST BP >= 140 MM HG: CPT | Mod: CPTII,S$GLB,, | Performed by: INTERNAL MEDICINE

## 2023-04-26 PROCEDURE — 3077F PR MOST RECENT SYSTOLIC BLOOD PRESSURE >= 140 MM HG: ICD-10-PCS | Mod: CPTII,S$GLB,, | Performed by: INTERNAL MEDICINE

## 2023-04-26 PROCEDURE — 99204 PR OFFICE/OUTPT VISIT, NEW, LEVL IV, 45-59 MIN: ICD-10-PCS | Mod: S$GLB,,, | Performed by: INTERNAL MEDICINE

## 2023-04-26 PROCEDURE — 3008F BODY MASS INDEX DOCD: CPT | Mod: CPTII,S$GLB,, | Performed by: INTERNAL MEDICINE

## 2023-04-26 PROCEDURE — 3080F PR MOST RECENT DIASTOLIC BLOOD PRESSURE >= 90 MM HG: ICD-10-PCS | Mod: CPTII,S$GLB,, | Performed by: INTERNAL MEDICINE

## 2023-04-26 PROCEDURE — 3044F PR MOST RECENT HEMOGLOBIN A1C LEVEL <7.0%: ICD-10-PCS | Mod: CPTII,S$GLB,, | Performed by: INTERNAL MEDICINE

## 2023-04-26 PROCEDURE — 1160F RVW MEDS BY RX/DR IN RCRD: CPT | Mod: CPTII,S$GLB,, | Performed by: INTERNAL MEDICINE

## 2023-04-26 PROCEDURE — 1160F PR REVIEW ALL MEDS BY PRESCRIBER/CLIN PHARMACIST DOCUMENTED: ICD-10-PCS | Mod: CPTII,S$GLB,, | Performed by: INTERNAL MEDICINE

## 2023-04-26 PROCEDURE — 1159F PR MEDICATION LIST DOCUMENTED IN MEDICAL RECORD: ICD-10-PCS | Mod: CPTII,S$GLB,, | Performed by: INTERNAL MEDICINE

## 2023-04-26 PROCEDURE — 1159F MED LIST DOCD IN RCRD: CPT | Mod: CPTII,S$GLB,, | Performed by: INTERNAL MEDICINE

## 2023-04-26 PROCEDURE — 3080F DIAST BP >= 90 MM HG: CPT | Mod: CPTII,S$GLB,, | Performed by: INTERNAL MEDICINE

## 2023-04-26 PROCEDURE — 99999 PR PBB SHADOW E&M-EST. PATIENT-LVL IV: ICD-10-PCS | Mod: PBBFAC,,, | Performed by: INTERNAL MEDICINE

## 2023-04-26 PROCEDURE — 99999 PR PBB SHADOW E&M-EST. PATIENT-LVL IV: CPT | Mod: PBBFAC,,, | Performed by: INTERNAL MEDICINE

## 2023-04-26 PROCEDURE — 3008F PR BODY MASS INDEX (BMI) DOCUMENTED: ICD-10-PCS | Mod: CPTII,S$GLB,, | Performed by: INTERNAL MEDICINE

## 2023-04-26 NOTE — PROGRESS NOTES
Subjective:   Patient ID:  Malgorzata Yoder is a 38 y.o. female who presents for evaluation of Establish Care      HPI  A 37 yo female with longstanding htn after preeclampsia hypokalemia obesity snoring asthma is here for evaluation of leg swelling. She had painless leg swelling went to er at [facility] no dvt on us mild elevation in d dimer potassium low bnp normal no real shortness of breath given diuretics it resolved. She claims compliance with salt and meds. Her digital htn readings showed good control. Her bp is elevated today did not take bp meds. Has daytime fatigue sleep apnea did not show much apnea. She tries to exercise but takes care of her kids  has no chest pain. Ekg suggest lvh hypokalemia.   Past Medical History:   Diagnosis Date    Asthma     childhood    Cervical cerclage suture present in second trimester 9/10/2019    12:00. [facility] cerclage    H/O incompetent cervix, currently pregnant, second trimester 9/10/2019    History of cerclage, currently pregnant 2019. Late prenatal care at 4 months, incidental finding of membranes in vaginal vault, cerclage placed per M - report requested    History of  delivery, currently pregnant 2019 failed  Cytotec induction of labor at 37 weeks secondary to  severe preeclampsia - [facility] -Dr. Cass Haywood   C/S -(uterine incision not documented) secondary to severe preeclampsia and recurrent late deceleration with SGA baby 4 lb 15 oz.  Dense fibrotic scar tissue was noted during surgery    History of ovarian cyst     Hypertension     Hypokalemia 2014    Leg swelling 2023    Paresthesia and pain of both upper extremities 2019       Past Surgical History:   Procedure Laterality Date    CARPAL TUNNEL RELEASE Bilateral     CERVICAL CERCLAGE N/A 09/10/2019    Procedure: CERCLAGE, CERVIX;  Surgeon: Vandana Smith MD;  Location: [facility] OR;  Service: OB/GYN;  Laterality: N/A;     SECTION        SECTION N/A 2020    Procedure:  SECTION;  Surgeon: Vandana Smith MD;  Location: [facility] L&D;  Service: OB/GYN;  Laterality: N/A;    MOUTH SURGERY      OOPHORECTOMY Right 2010       Social History     Tobacco Use    Smoking status: Never    Smokeless tobacco: Never   Substance Use Topics    Alcohol use: No    Drug use: No       Family History   Problem Relation Age of Onset    Aneurysm Mother     Hypertension Mother     Miscarriages / Stillbirths Mother     Cancer Maternal Aunt         brain tumor    Breast cancer Maternal Grandmother     Cancer Maternal Grandmother     Learning disabilities Daughter     Colon cancer Neg Hx     Ovarian cancer Neg Hx     Uterine cancer Neg Hx     Cervical cancer Neg Hx        Current Outpatient Medications   Medication Sig    amLODIPine (NORVASC) 10 MG tablet Take 10 mg by mouth.    labetaloL (NORMODYNE) 200 MG tablet TAKE 1 TABLET(200 MG) BY MOUTH EVERY 12 HOURS    norethindrone (ORTHO MICRONOR) 0.35 mg tablet Take 1 tablet (0.35 mg total) by mouth once daily.    potassium chloride 10% (KAYCIEL) 20 mEq/15 mL oral solution Take 15 mLs (20 mEq total) by mouth once daily.    sertraline (ZOLOFT) 25 MG tablet Take 1 tablet (25 mg total) by mouth once daily.    spironolactone (ALDACTONE) 50 MG tablet Take 1 tablet (50 mg total) by mouth 2 (two) times daily. for 5 days     No current facility-administered medications for this visit.     Current Outpatient Medications on File Prior to Visit   Medication Sig    amLODIPine (NORVASC) 10 MG tablet Take 10 mg by mouth.    labetaloL (NORMODYNE) 200 MG tablet TAKE 1 TABLET(200 MG) BY MOUTH EVERY 12 HOURS    norethindrone (ORTHO MICRONOR) 0.35 mg tablet Take 1 tablet (0.35 mg total) by mouth once daily.    potassium chloride 10% (KAYCIEL) 20 mEq/15 mL oral solution Take 15 mLs (20 mEq total) by mouth once daily.    sertraline (ZOLOFT) 25 MG tablet Take 1 tablet (25 mg total) by mouth once daily.    spironolactone  (ALDACTONE) 50 MG tablet Take 1 tablet (50 mg total) by mouth 2 (two) times daily. for 5 days     No current facility-administered medications on file prior to visit.       Review of patient's allergies indicates:  No Known Allergies    Review of Systems   Constitutional: Negative for diaphoresis, malaise/fatigue and weight gain.   HENT:  Negative for hoarse voice.    Eyes:  Negative for double vision and visual disturbance.   Cardiovascular:  Positive for leg swelling. Negative for chest pain, claudication, cyanosis, dyspnea on exertion, irregular heartbeat, near-syncope, orthopnea, palpitations, paroxysmal nocturnal dyspnea and syncope.   Respiratory:  Negative for cough, hemoptysis, shortness of breath and snoring.    Hematologic/Lymphatic: Negative for bleeding problem. Does not bruise/bleed easily.   Skin:  Negative for color change and poor wound healing.   Musculoskeletal:  Negative for muscle cramps, muscle weakness and myalgias.   Gastrointestinal:  Negative for bloating, abdominal pain, change in bowel habit, diarrhea, heartburn, hematemesis, hematochezia, melena and nausea.   Neurological:  Negative for excessive daytime sleepiness, dizziness, headaches, light-headedness, loss of balance, numbness and weakness.   Psychiatric/Behavioral:  Negative for memory loss. The patient does not have insomnia.    Allergic/Immunologic: Negative for hives.     Objective:   Physical Exam  Constitutional:       General: She is not in acute distress.     Appearance: Normal appearance. She is well-developed. She is obese. She is not ill-appearing.   HENT:      Head: Normocephalic and atraumatic.   Eyes:      General: No scleral icterus.     Pupils: Pupils are equal, round, and reactive to light.   Neck:      Thyroid: No thyromegaly.      Vascular: Normal carotid pulses. No carotid bruit, hepatojugular reflux or JVD.      Trachea: No tracheal deviation.   Cardiovascular:      Rate and Rhythm: Normal rate and regular rhythm.  "     Pulses: Normal pulses.      Heart sounds: Normal heart sounds. No murmur heard.    No friction rub. No gallop.   Pulmonary:      Effort: Pulmonary effort is normal. No respiratory distress.      Breath sounds: Normal breath sounds. No wheezing, rhonchi or rales.   Chest:      Chest wall: No tenderness.   Abdominal:      General: Bowel sounds are normal. There is no abdominal bruit.      Palpations: Abdomen is soft. There is no hepatomegaly or pulsatile mass.      Tenderness: There is no abdominal tenderness.   Musculoskeletal:      Right shoulder: No deformity.      Cervical back: Normal range of motion and neck supple.      Right lower leg: No edema.      Left lower leg: No edema.   Skin:     General: Skin is warm and dry.      Findings: No erythema or rash.      Nails: There is no clubbing.   Neurological:      General: No focal deficit present.      Mental Status: She is alert and oriented to person, place, and time.      Cranial Nerves: No cranial nerve deficit.      Coordination: Coordination normal.   Psychiatric:         Mood and Affect: Mood normal.         Speech: Speech normal.         Behavior: Behavior normal.     Vitals:    04/26/23 1043   BP: (!) 140/90   BP Location: Right arm   Patient Position: Sitting   BP Method: Large (Manual)   Pulse: 80   SpO2: 98%   Weight: 108 kg (238 lb 1.6 oz)   Height: 5' 5" (1.651 m)     Lab Results   Component Value Date    CHOL 182 03/14/2023    CHOL 177 02/23/2022    CHOL 175 04/13/2021     Body mass index is 39.62 kg/m².   Lab Results   Component Value Date    HGBA1C 4.9 02/23/2023      BMP  Lab Results   Component Value Date     04/17/2023    K 3.0 (L) 04/17/2023     04/17/2023    CO2 25 04/17/2023    BUN 13 04/17/2023    CREATININE 1.1 04/17/2023    CALCIUM 9.2 04/17/2023    ANIONGAP 12 04/17/2023    EGFRNORACEVR >60 04/17/2023      Lab Results   Component Value Date    HDL 50 03/14/2023    HDL 46 02/23/2022    HDL 43 04/13/2021     Lab Results "   Component Value Date    LDLCALC 114.4 03/14/2023    LDLCALC 112.2 02/23/2022    LDLCALC 114.0 04/13/2021     Lab Results   Component Value Date    TRIG 88 03/14/2023    TRIG 94 02/23/2022    TRIG 90 04/13/2021     Lab Results   Component Value Date    CHOLHDL 27.5 03/14/2023    CHOLHDL 26.0 02/23/2022    CHOLHDL 24.6 04/13/2021       Chemistry        Component Value Date/Time     04/17/2023 0144    K 3.0 (L) 04/17/2023 0144     04/17/2023 0144    CO2 25 04/17/2023 0144    BUN 13 04/17/2023 0144    CREATININE 1.1 04/17/2023 0144    GLU 89 04/17/2023 0144        Component Value Date/Time    CALCIUM 9.2 04/17/2023 0144    ALKPHOS 55 04/17/2023 0144    AST 20 04/17/2023 0144    ALT 17 04/17/2023 0144    BILITOT 0.4 04/17/2023 0144    ESTGFRAFRICA >60.0 02/23/2022 0912    EGFRNONAA >60.0 02/23/2022 0912          Lab Results   Component Value Date    TSH 2.490 04/16/2023     No results found for: INR, PROTIME  Lab Results   Component Value Date    WBC 7.72 04/17/2023    HGB 10.2 (L) 04/17/2023    HCT 30.8 (L) 04/17/2023    MCV 84 04/17/2023     04/17/2023     BNP  @LABRCNTIP(BNP,BNPTRIAGEBLO)@  CrCl cannot be calculated (Patient's most recent lab result is older than the maximum 7 days allowed.).  Assessment:     1. Essential hypertension    2. Pedal edema    3. Morbid obesity with BMI of 40.0-44.9, adult    4. Hypokalemia    5. Leg swelling      Htn by review of digital program seems controlled counseled about low slat diet exercise weight loss. Her bp is elevarted today because of not taking her meds.    Hypokalemia treated in er  and is replaced. Also on aldactone will address with pcp . Had ekg changes from hypokalemia.    Snoring obesity needs to lose weight will benefit from formal; sleep study in house will get echo to make sure no pulmonary htn weight loss discussed.    Leg swelling related to amlodipine low salt compression socks leg elevation discussed no dvt by ultrasound.   Plan:   Echo     Continue current meds    Low slat diet   Exercise    Weight loss   Phone review   Consider formal sleep study.

## 2023-05-10 ENCOUNTER — HOSPITAL ENCOUNTER (OUTPATIENT)
Dept: CARDIOLOGY | Facility: HOSPITAL | Age: 38
Discharge: HOME | End: 2023-05-10
Attending: INTERNAL MEDICINE
Payer: COMMERCIAL

## 2023-05-10 ENCOUNTER — TELEPHONE (OUTPATIENT)
Dept: CARDIOLOGY | Facility: CLINIC | Age: 38
End: 2023-05-10

## 2023-05-10 VITALS — BODY MASS INDEX: 39.65 KG/M2 | WEIGHT: 238 LBS | HEIGHT: 65 IN

## 2023-05-10 DIAGNOSIS — I10 ESSENTIAL HYPERTENSION: ICD-10-CM

## 2023-05-10 LAB
AV INDEX (PROSTH): 0.87
AV MEAN GRADIENT: 5 MMHG
AV PEAK GRADIENT: 11 MMHG
AV VALVE AREA: 2.62 CM2
AV VELOCITY RATIO: 0.88
BSA FOR ECHO PROCEDURE: 2.22 M2
CV ECHO LV RWT: 0.53 CM
DOP CALC AO PEAK VEL: 1.63 M/S
DOP CALC AO VTI: 33.4 CM
DOP CALC LVOT AREA: 3 CM2
DOP CALC LVOT DIAMETER: 1.96 CM
DOP CALC LVOT PEAK VEL: 1.43 M/S
DOP CALC LVOT STROKE VOLUME: 87.45 CM3
DOP CALC RVOT PEAK VEL: 0.81 M/S
DOP CALC RVOT VTI: 18 CM
DOP CALCLVOT PEAK VEL VTI: 29 CM
E WAVE DECELERATION TIME: 239.83 MSEC
E/A RATIO: 1.4
E/E' RATIO: 7.28 M/S
ECHO LV POSTERIOR WALL: 1.15 CM (ref 0.6–1.1)
EJECTION FRACTION: 65 %
FRACTIONAL SHORTENING: 36 % (ref 28–44)
INTERVENTRICULAR SEPTUM: 1.15 CM (ref 0.6–1.1)
IVRT: 74.22 MSEC
LA MAJOR: 5.2 CM
LA MINOR: 5.22 CM
LA WIDTH: 3.3 CM
LEFT ATRIUM SIZE: 3.15 CM
LEFT ATRIUM VOLUME INDEX MOD: 19.7 ML/M2
LEFT ATRIUM VOLUME INDEX: 21.6 ML/M2
LEFT ATRIUM VOLUME MOD: 41.94 CM3
LEFT ATRIUM VOLUME: 46.03 CM3
LEFT INTERNAL DIMENSION IN SYSTOLE: 2.8 CM (ref 2.1–4)
LEFT VENTRICLE DIASTOLIC VOLUME INDEX: 40.72 ML/M2
LEFT VENTRICLE DIASTOLIC VOLUME: 86.74 ML
LEFT VENTRICLE MASS INDEX: 84 G/M2
LEFT VENTRICLE SYSTOLIC VOLUME INDEX: 13.9 ML/M2
LEFT VENTRICLE SYSTOLIC VOLUME: 29.58 ML
LEFT VENTRICULAR INTERNAL DIMENSION IN DIASTOLE: 4.38 CM (ref 3.5–6)
LEFT VENTRICULAR MASS: 178.69 G
LV LATERAL E/E' RATIO: 7 M/S
LV SEPTAL E/E' RATIO: 7.58 M/S
LVOT MG: 3.98 MMHG
LVOT MV: 0.94 CM/S
MV PEAK A VEL: 0.65 M/S
MV PEAK E VEL: 0.91 M/S
PISA TR MAX VEL: 2.28 M/S
PULM VEIN S/D RATIO: 1.05
PV MEAN GRADIENT: 1.42 MMHG
PV PEAK D VEL: 0.59 M/S
PV PEAK S VEL: 0.62 M/S
PV PEAK VELOCITY: 0.94 CM/S
RA MAJOR: 4.84 CM
RA PRESSURE: 8 MMHG
RA WIDTH: 3.02 CM
RIGHT VENTRICULAR END-DIASTOLIC DIMENSION: 3.29 CM
SINUS: 3.14 CM
STJ: 2.32 CM
TDI LATERAL: 0.13 M/S
TDI SEPTAL: 0.12 M/S
TDI: 0.13 M/S
TR MAX PG: 21 MMHG
TV REST PULMONARY ARTERY PRESSURE: 29 MMHG

## 2023-05-10 PROCEDURE — 93306 ECHO (CUPID ONLY): ICD-10-PCS | Mod: 26,,, | Performed by: INTERNAL MEDICINE

## 2023-05-10 PROCEDURE — 93306 TTE W/DOPPLER COMPLETE: CPT

## 2023-05-10 PROCEDURE — 93306 TTE W/DOPPLER COMPLETE: CPT | Mod: 26,,, | Performed by: INTERNAL MEDICINE

## 2023-05-10 NOTE — TELEPHONE ENCOUNTER
Patient was notified of results. All questions were answered. Pt verbalized understanding. Pt will call back with any other questions or concerns.      ----- Message from Laura Siddiqui MD sent at 5/10/2023  2:26 PM CDT -----  ECHO NORMAL    LEAK IN HEART VALVE MINIMAL OF NO SIGNIFICANCE

## 2023-07-01 DIAGNOSIS — F32.0 CURRENT MILD EPISODE OF MAJOR DEPRESSIVE DISORDER WITHOUT PRIOR EPISODE: ICD-10-CM

## 2023-07-01 NOTE — TELEPHONE ENCOUNTER
No care due was identified.  [facility] Embedded Care Due Messages. Reference number: [id number].   7/01/2023 9:22:27 AM CDT

## 2023-07-02 NOTE — TELEPHONE ENCOUNTER
Refill Routing Note   Medication(s) are not appropriate for processing by [facility] for the following reason(s):      New or recently adjusted medication    [facility] action(s):  Defer None identified          Appointments  past 12m or future 3m with PCP    Date Provider   Last Visit   4/18/2023 Sofia Abdul MD   Next Visit   9/15/2023 Sofia Abdul MD   ED visits in past 90 days: 2        Note composed:10:47 PM 07/01/2023

## 2023-07-04 RX ORDER — SERTRALINE HYDROCHLORIDE 25 MG/1
TABLET, FILM COATED ORAL
Qty: 90 TABLET | Refills: 1 | Status: SHIPPED | OUTPATIENT
Start: 2023-07-04 | End: 2023-08-18

## 2023-07-06 NOTE — PROGRESS NOTES
Lactation Note.    Mother in with infant for bili blood draw.    States that she is still breastfeeding infant every 2-3 hours and still experiencing nipple tenderness.  Denies damage, states nipples are intact.  She is managing with expressed breast milk, nipple ointment, and ice packs.    Advised mother that outpatient consults are available and encouraged her to call the warm line if the discomfort continues.  Mother verbalizes understanding.     Vivi Reis RN, IBCLC  
Calm

## 2023-07-07 ENCOUNTER — PATIENT MESSAGE (OUTPATIENT)
Dept: INFECTIOUS DISEASES | Facility: CLINIC | Age: 38
End: 2023-07-07
Payer: COMMERCIAL

## 2023-08-18 ENCOUNTER — OFFICE VISIT (OUTPATIENT)
Dept: OBSTETRICS AND GYNECOLOGY | Facility: CLINIC | Age: 38
End: 2023-08-18
Payer: COMMERCIAL

## 2023-08-18 VITALS
SYSTOLIC BLOOD PRESSURE: 160 MMHG | DIASTOLIC BLOOD PRESSURE: 99 MMHG | WEIGHT: 238.56 LBS | BODY MASS INDEX: 39.75 KG/M2 | HEIGHT: 65 IN

## 2023-08-18 DIAGNOSIS — Z12.4 PAP SMEAR FOR CERVICAL CANCER SCREENING: Primary | ICD-10-CM

## 2023-08-18 PROCEDURE — 3080F DIAST BP >= 90 MM HG: CPT | Mod: CPTII,S$GLB,, | Performed by: OBSTETRICS & GYNECOLOGY

## 2023-08-18 PROCEDURE — 1159F MED LIST DOCD IN RCRD: CPT | Mod: CPTII,S$GLB,, | Performed by: OBSTETRICS & GYNECOLOGY

## 2023-08-18 PROCEDURE — 88141 CYTOPATH C/V INTERPRET: CPT | Mod: ,,, | Performed by: PATHOLOGY

## 2023-08-18 PROCEDURE — 3008F PR BODY MASS INDEX (BMI) DOCUMENTED: ICD-10-PCS | Mod: CPTII,S$GLB,, | Performed by: OBSTETRICS & GYNECOLOGY

## 2023-08-18 PROCEDURE — 99999 PR PBB SHADOW E&M-EST. PATIENT-LVL III: ICD-10-PCS | Mod: PBBFAC,,, | Performed by: OBSTETRICS & GYNECOLOGY

## 2023-08-18 PROCEDURE — 88142 CYTOPATH C/V THIN LAYER: CPT | Performed by: PATHOLOGY

## 2023-08-18 PROCEDURE — 3008F BODY MASS INDEX DOCD: CPT | Mod: CPTII,S$GLB,, | Performed by: OBSTETRICS & GYNECOLOGY

## 2023-08-18 PROCEDURE — 1160F RVW MEDS BY RX/DR IN RCRD: CPT | Mod: CPTII,S$GLB,, | Performed by: OBSTETRICS & GYNECOLOGY

## 2023-08-18 PROCEDURE — 99395 PR PREVENTIVE VISIT,EST,18-39: ICD-10-PCS | Mod: S$GLB,,, | Performed by: OBSTETRICS & GYNECOLOGY

## 2023-08-18 PROCEDURE — 1160F PR REVIEW ALL MEDS BY PRESCRIBER/CLIN PHARMACIST DOCUMENTED: ICD-10-PCS | Mod: CPTII,S$GLB,, | Performed by: OBSTETRICS & GYNECOLOGY

## 2023-08-18 PROCEDURE — 3044F PR MOST RECENT HEMOGLOBIN A1C LEVEL <7.0%: ICD-10-PCS | Mod: CPTII,S$GLB,, | Performed by: OBSTETRICS & GYNECOLOGY

## 2023-08-18 PROCEDURE — 3044F HG A1C LEVEL LT 7.0%: CPT | Mod: CPTII,S$GLB,, | Performed by: OBSTETRICS & GYNECOLOGY

## 2023-08-18 PROCEDURE — 3077F PR MOST RECENT SYSTOLIC BLOOD PRESSURE >= 140 MM HG: ICD-10-PCS | Mod: CPTII,S$GLB,, | Performed by: OBSTETRICS & GYNECOLOGY

## 2023-08-18 PROCEDURE — 3080F PR MOST RECENT DIASTOLIC BLOOD PRESSURE >= 90 MM HG: ICD-10-PCS | Mod: CPTII,S$GLB,, | Performed by: OBSTETRICS & GYNECOLOGY

## 2023-08-18 PROCEDURE — 99999 PR PBB SHADOW E&M-EST. PATIENT-LVL III: CPT | Mod: PBBFAC,,, | Performed by: OBSTETRICS & GYNECOLOGY

## 2023-08-18 PROCEDURE — 3077F SYST BP >= 140 MM HG: CPT | Mod: CPTII,S$GLB,, | Performed by: OBSTETRICS & GYNECOLOGY

## 2023-08-18 PROCEDURE — 1159F PR MEDICATION LIST DOCUMENTED IN MEDICAL RECORD: ICD-10-PCS | Mod: CPTII,S$GLB,, | Performed by: OBSTETRICS & GYNECOLOGY

## 2023-08-18 PROCEDURE — 99395 PREV VISIT EST AGE 18-39: CPT | Mod: S$GLB,,, | Performed by: OBSTETRICS & GYNECOLOGY

## 2023-08-18 PROCEDURE — 88141 PR  CYTOPATH CERV/VAG INTERPRET: ICD-10-PCS | Mod: ,,, | Performed by: PATHOLOGY

## 2023-08-18 NOTE — PROGRESS NOTES
Subjective:      Patient ID: Malgorzata Yoder is a 38 y.o. female.    Chief Complaint:  Annual Exam      History of Present Illness  HPI  Annual Exam-Premenopausal  Patient presents for annual exam. The patient has no complaints today. The patient is not currently sexually active. GYN screening history: last pap: approximate date  and was abnormal: ASCUS HPV+ with TRAVON 1 on colpo () . The patient wears seatbelts: yes. The patient participates in regular exercise: no. Has the patient ever been transfused or tattooed?: yes. The patient reports that there is not domestic violence in her life.  Menses are regular with periods lasting 7 days.  Denies excessive bleeding or cramping.  Stopped taking birth control as she is not sexually active.      GYN & OB History  Patient's last menstrual period was 2023 (exact date).   Date of Last Pap: 2022    OB History    Para Term  AB Living   4 2 1 1 1 2   SAB IAB Ectopic Multiple Live Births   1     0 1      # Outcome Date GA Lbr Tre/2nd Weight Sex Delivery Anes PTL Lv   4             3 Term 20 38w1d  3.16 kg (6 lb 15.5 oz) M CS-LTranv Spinal N LUANN   2  12 36w0d  0.907 kg (2 lb) F CS-Unspec EPI Y       Complications: Hypertension affecting pregnancy, Pre-eclampsia   1 SAB                Review of Systems  Review of Systems   Constitutional:  Negative for activity change, appetite change, chills, fatigue, fever and unexpected weight change.   Respiratory:  Negative for shortness of breath.    Cardiovascular:  Negative for chest pain, palpitations and leg swelling.   Gastrointestinal:  Negative for abdominal pain, bloating, blood in stool, constipation, diarrhea, nausea and vomiting.   Genitourinary:  Positive for vaginal bleeding (on menses). Negative for dysmenorrhea, dyspareunia, dysuria, flank pain, frequency, genital sores, hematuria, menorrhagia, menstrual problem, pelvic pain, urgency, vaginal discharge, vaginal  pain, urinary incontinence, postcoital bleeding, vaginal dryness and vaginal odor.   Musculoskeletal:  Negative for back pain.   Integumentary:  Negative for breast mass, nipple discharge, breast skin changes and breast tenderness.   Neurological:  Negative for syncope and headaches.   Breast: Negative for asymmetry, lump, mass, mastodynia, nipple discharge, skin changes and tenderness         Objective:     Physical Exam:   Constitutional: She is oriented to person, place, and time. She appears well-developed and well-nourished. No distress.    HENT:   Head: Normocephalic and atraumatic.    Eyes: Pupils are equal, round, and reactive to light. EOM are normal.     Cardiovascular:  Normal rate, regular rhythm and normal heart sounds.             Pulmonary/Chest: Effort normal and breath sounds normal.        Abdominal: Soft. Bowel sounds are normal. She exhibits no distension. There is no abdominal tenderness.     Genitourinary:    Uterus, right adnexa and left adnexa normal.      Pelvic exam was performed with patient supine.   There is no rash, tenderness, lesion or injury on the right labia. There is no rash, tenderness, lesion or injury on the left labia. Cervix is normal. Right adnexum displays no mass, no tenderness and no fullness. Left adnexum displays no mass, no tenderness and no fullness. There is bleeding in the vagina. No erythema,  no vaginal discharge or tenderness in the vagina.    No foreign body in the vagina.      No signs of injury in the vagina.   Cervix exhibits no motion tenderness, no discharge and no friability. Uterus is not deviated, not enlarged and not tender.           Musculoskeletal: Normal range of motion and moves all extremeties. No tenderness or edema.       Neurological: She is alert and oriented to person, place, and time.    Skin: Skin is warm and dry.    Psychiatric: She has a normal mood and affect. Her behavior is normal. Thought content normal.      Pap history:  01/23 - CIN  1  08/23 - ASCUS HPV +     Assessment:     1. Pap smear for cervical cancer screening           Plan:     Pap smear for cervical cancer screening  -     Liquid-Based Pap Smear, Screening  -     Pt was counseled on cervical/vaginal screening guidelines and recommendations.  See pap history above.  If today's pap smear result is negative, next pap smear will be due in 1 yr.  -     Pt was advised on current breast cancer screening recommendations.    -     Follow up with PCP for routine health maintenance needs.  -     Pt counseled on contraception.  Is currently abstinent.  Does not desire contraception.      Follow up in about 1 year (around 8/18/2024).

## 2023-08-25 LAB
FINAL PATHOLOGIC DIAGNOSIS: NORMAL
Lab: NORMAL

## 2023-09-15 ENCOUNTER — OFFICE VISIT (OUTPATIENT)
Dept: INTERNAL MEDICINE | Facility: CLINIC | Age: 38
End: 2023-09-15
Payer: COMMERCIAL

## 2023-09-15 VITALS
HEIGHT: 65 IN | SYSTOLIC BLOOD PRESSURE: 144 MMHG | RESPIRATION RATE: 18 BRPM | DIASTOLIC BLOOD PRESSURE: 90 MMHG | BODY MASS INDEX: 39.15 KG/M2 | HEART RATE: 73 BPM | OXYGEN SATURATION: 99 % | WEIGHT: 235 LBS

## 2023-09-15 DIAGNOSIS — E66.01 SEVERE OBESITY WITH BODY MASS INDEX (BMI) OF 35.0 TO 39.9 WITH SERIOUS COMORBIDITY: ICD-10-CM

## 2023-09-15 DIAGNOSIS — I10 ESSENTIAL HYPERTENSION: Primary | ICD-10-CM

## 2023-09-15 DIAGNOSIS — E87.6 HYPOKALEMIA: ICD-10-CM

## 2023-09-15 DIAGNOSIS — E55.9 VITAMIN D DEFICIENCY: ICD-10-CM

## 2023-09-15 PROBLEM — R60.0 PEDAL EDEMA: Status: RESOLVED | Noted: 2023-04-26 | Resolved: 2023-09-15

## 2023-09-15 PROBLEM — M79.89 LEG SWELLING: Status: RESOLVED | Noted: 2023-04-26 | Resolved: 2023-09-15

## 2023-09-15 PROCEDURE — 3080F PR MOST RECENT DIASTOLIC BLOOD PRESSURE >= 90 MM HG: ICD-10-PCS | Mod: CPTII,S$GLB,, | Performed by: FAMILY MEDICINE

## 2023-09-15 PROCEDURE — 3077F PR MOST RECENT SYSTOLIC BLOOD PRESSURE >= 140 MM HG: ICD-10-PCS | Mod: CPTII,S$GLB,, | Performed by: FAMILY MEDICINE

## 2023-09-15 PROCEDURE — 1159F MED LIST DOCD IN RCRD: CPT | Mod: CPTII,S$GLB,, | Performed by: FAMILY MEDICINE

## 2023-09-15 PROCEDURE — 3044F HG A1C LEVEL LT 7.0%: CPT | Mod: CPTII,S$GLB,, | Performed by: FAMILY MEDICINE

## 2023-09-15 PROCEDURE — 99999 PR PBB SHADOW E&M-EST. PATIENT-LVL III: CPT | Mod: PBBFAC,,, | Performed by: FAMILY MEDICINE

## 2023-09-15 PROCEDURE — 3008F BODY MASS INDEX DOCD: CPT | Mod: CPTII,S$GLB,, | Performed by: FAMILY MEDICINE

## 2023-09-15 PROCEDURE — 3077F SYST BP >= 140 MM HG: CPT | Mod: CPTII,S$GLB,, | Performed by: FAMILY MEDICINE

## 2023-09-15 PROCEDURE — 1159F PR MEDICATION LIST DOCUMENTED IN MEDICAL RECORD: ICD-10-PCS | Mod: CPTII,S$GLB,, | Performed by: FAMILY MEDICINE

## 2023-09-15 PROCEDURE — 3080F DIAST BP >= 90 MM HG: CPT | Mod: CPTII,S$GLB,, | Performed by: FAMILY MEDICINE

## 2023-09-15 PROCEDURE — 1160F RVW MEDS BY RX/DR IN RCRD: CPT | Mod: CPTII,S$GLB,, | Performed by: FAMILY MEDICINE

## 2023-09-15 PROCEDURE — 3008F PR BODY MASS INDEX (BMI) DOCUMENTED: ICD-10-PCS | Mod: CPTII,S$GLB,, | Performed by: FAMILY MEDICINE

## 2023-09-15 PROCEDURE — 3044F PR MOST RECENT HEMOGLOBIN A1C LEVEL <7.0%: ICD-10-PCS | Mod: CPTII,S$GLB,, | Performed by: FAMILY MEDICINE

## 2023-09-15 PROCEDURE — 4010F PR ACE/ARB THEARPY RXD/TAKEN: ICD-10-PCS | Mod: CPTII,S$GLB,, | Performed by: FAMILY MEDICINE

## 2023-09-15 PROCEDURE — 1160F PR REVIEW ALL MEDS BY PRESCRIBER/CLIN PHARMACIST DOCUMENTED: ICD-10-PCS | Mod: CPTII,S$GLB,, | Performed by: FAMILY MEDICINE

## 2023-09-15 PROCEDURE — 99214 OFFICE O/P EST MOD 30 MIN: CPT | Mod: S$GLB,,, | Performed by: FAMILY MEDICINE

## 2023-09-15 PROCEDURE — 4010F ACE/ARB THERAPY RXD/TAKEN: CPT | Mod: CPTII,S$GLB,, | Performed by: FAMILY MEDICINE

## 2023-09-15 PROCEDURE — 99214 PR OFFICE/OUTPT VISIT, EST, LEVL IV, 30-39 MIN: ICD-10-PCS | Mod: S$GLB,,, | Performed by: FAMILY MEDICINE

## 2023-09-15 PROCEDURE — 99999 PR PBB SHADOW E&M-EST. PATIENT-LVL III: ICD-10-PCS | Mod: PBBFAC,,, | Performed by: FAMILY MEDICINE

## 2023-09-15 RX ORDER — LOSARTAN POTASSIUM 25 MG/1
25 TABLET ORAL DAILY
Qty: 30 TABLET | Refills: 3 | Status: SHIPPED | OUTPATIENT
Start: 2023-09-15

## 2023-09-15 NOTE — PROGRESS NOTES
"Subjective:       Patient ID: Malgorzata Yoder is a 38 y.o. female.    Chief Complaint: Follow-up    38-year-old  female patient with Patient Active Problem List:     Essential hypertension     Hypokalemia     Carpal tunnel syndrome on both sides     Severe obesity with body mass index (BMI) of 35.0 to 39.9 with serious comorbidity  Here for follow-up on blood pressure  Reports that she has stopped taking amlodipine 10 mg which was making her leg swelling and has been on labetalol 200 mg twice daily  Continues to take potassium supplements regularly  Denies of any chest pain or difficulty breathing with palpitations      Review of Systems   Constitutional:  Negative for fatigue.   Eyes:  Negative for visual disturbance.   Respiratory:  Negative for chest tightness and shortness of breath.    Cardiovascular:  Negative for chest pain and leg swelling.   Gastrointestinal:  Negative for abdominal pain, nausea and vomiting.   Musculoskeletal:  Negative for myalgias.   Skin:  Negative for rash.   Neurological:  Negative for weakness, light-headedness, numbness and headaches.   Psychiatric/Behavioral:  Negative for sleep disturbance.          BP (!) 144/90 (BP Location: Right arm, Patient Position: Sitting, BP Method: Large (Manual))   Pulse 73   Resp 18   Ht 5' 5" (1.651 m)   Wt 106.6 kg (235 lb 0.2 oz)   LMP 08/18/2023 (Exact Date)   SpO2 99%   BMI 39.11 kg/m²   Objective:      Physical Exam  Constitutional:       Appearance: She is well-developed.   HENT:      Head: Normocephalic and atraumatic.   Cardiovascular:      Rate and Rhythm: Normal rate and regular rhythm.      Heart sounds: Normal heart sounds. No murmur heard.  Pulmonary:      Effort: Pulmonary effort is normal.      Breath sounds: Normal breath sounds. No wheezing.   Abdominal:      General: Bowel sounds are normal.      Palpations: Abdomen is soft.      Tenderness: There is no abdominal tenderness.   Skin:     General: Skin is " warm and dry.      Findings: No rash.   Neurological:      Mental Status: She is alert and oriented to person, place, and time.   Psychiatric:         Mood and Affect: Mood normal.           Assessment/Plan:   1. Essential hypertension  Overview:   Diagnosed in 2012 following 1st delivery.  Lisinopril 20 mg HCTZ 25 mg, 1 tab q.d. Since 2015.   07 /02/2019 Changed to labetalol 100 mg twice a day, CMP PCR added to prenatal lab for baseline.  Advised daily baby aspirin at 12-14 weeks  Weekly ultrasounds at 32 weeks    Orders:  -     CBC Auto Differential; Future; Expected date: 09/15/2023  -     Basic Metabolic Panel; Future; Expected date: 09/15/2023  -     losartan (COZAAR) 25 MG tablet; Take 1 tablet (25 mg total) by mouth once daily.  Dispense: 30 tablet; Refill: 3  Blood pressure mildly elevated from just taking labetalol 200 mg twice daily  Will add losartan 25 mg daily  Start monitoring blood pressure trends and return to the clinic in 2 weeks for blood pressure as well as labs    2. Hypokalemia  -     Basic Metabolic Panel; Future; Expected date: 09/15/2023  Continue potassium supplements 20 meq  daily  Will check to see if there is any improvement in potassium levels  Patient clinically asymptomatic    3. Vitamin D deficiency  -     Vitamin D; Future; Expected date: 09/15/2023  Will check vitamin-D levels as it was low in the past    4. Severe obesity with body mass index (BMI) of 35.0 to 39.9 with serious comorbidity  Lifestyle modifications recommended to lose weight with BMI 39

## 2023-09-28 ENCOUNTER — LAB VISIT (OUTPATIENT)
Dept: LAB | Facility: HOSPITAL | Age: 38
End: 2023-09-28
Attending: FAMILY MEDICINE
Payer: COMMERCIAL

## 2023-09-28 ENCOUNTER — CLINICAL SUPPORT (OUTPATIENT)
Dept: INTERNAL MEDICINE | Facility: CLINIC | Age: 38
End: 2023-09-28
Payer: COMMERCIAL

## 2023-09-28 VITALS — DIASTOLIC BLOOD PRESSURE: 80 MMHG | SYSTOLIC BLOOD PRESSURE: 138 MMHG

## 2023-09-28 DIAGNOSIS — E87.6 HYPOKALEMIA: ICD-10-CM

## 2023-09-28 DIAGNOSIS — I10 ESSENTIAL HYPERTENSION: ICD-10-CM

## 2023-09-28 DIAGNOSIS — I10 ESSENTIAL HYPERTENSION: Primary | ICD-10-CM

## 2023-09-28 DIAGNOSIS — E55.9 VITAMIN D DEFICIENCY: ICD-10-CM

## 2023-09-28 LAB
25(OH)D3+25(OH)D2 SERPL-MCNC: 38 NG/ML (ref 30–96)
ANION GAP SERPL CALC-SCNC: 6 MMOL/L (ref 8–16)
BASOPHILS # BLD AUTO: 0.03 K/UL (ref 0–0.2)
BASOPHILS NFR BLD: 0.5 % (ref 0–1.9)
BUN SERPL-MCNC: 6 MG/DL (ref 6–20)
CALCIUM SERPL-MCNC: 9 MG/DL (ref 8.7–10.5)
CHLORIDE SERPL-SCNC: 103 MMOL/L (ref 95–110)
CO2 SERPL-SCNC: 29 MMOL/L (ref 23–29)
CREAT SERPL-MCNC: 0.9 MG/DL (ref 0.5–1.4)
DIFFERENTIAL METHOD: ABNORMAL
EOSINOPHIL # BLD AUTO: 0.2 K/UL (ref 0–0.5)
EOSINOPHIL NFR BLD: 3.4 % (ref 0–8)
ERYTHROCYTE [DISTWIDTH] IN BLOOD BY AUTOMATED COUNT: 14.6 % (ref 11.5–14.5)
EST. GFR  (NO RACE VARIABLE): >60 ML/MIN/1.73 M^2
GLUCOSE SERPL-MCNC: 93 MG/DL (ref 70–110)
HCT VFR BLD AUTO: 35.1 % (ref 37–48.5)
HGB BLD-MCNC: 11.1 G/DL (ref 12–16)
IMM GRANULOCYTES # BLD AUTO: 0.02 K/UL (ref 0–0.04)
IMM GRANULOCYTES NFR BLD AUTO: 0.3 % (ref 0–0.5)
LYMPHOCYTES # BLD AUTO: 2.1 K/UL (ref 1–4.8)
LYMPHOCYTES NFR BLD: 34.9 % (ref 18–48)
MCH RBC QN AUTO: 28 PG (ref 27–31)
MCHC RBC AUTO-ENTMCNC: 31.6 G/DL (ref 32–36)
MCV RBC AUTO: 88 FL (ref 82–98)
MONOCYTES # BLD AUTO: 0.5 K/UL (ref 0.3–1)
MONOCYTES NFR BLD: 7.8 % (ref 4–15)
NEUTROPHILS # BLD AUTO: 3.1 K/UL (ref 1.8–7.7)
NEUTROPHILS NFR BLD: 53.1 % (ref 38–73)
NRBC BLD-RTO: 0 /100 WBC
PLATELET # BLD AUTO: 264 K/UL (ref 150–450)
PMV BLD AUTO: 12.1 FL (ref 9.2–12.9)
POTASSIUM SERPL-SCNC: 3.2 MMOL/L (ref 3.5–5.1)
RBC # BLD AUTO: 3.97 M/UL (ref 4–5.4)
SODIUM SERPL-SCNC: 138 MMOL/L (ref 136–145)
WBC # BLD AUTO: 5.88 K/UL (ref 3.9–12.7)

## 2023-09-28 PROCEDURE — 99999 PR PBB SHADOW E&M-EST. PATIENT-LVL I: CPT | Mod: PBBFAC,,,

## 2023-09-28 PROCEDURE — 36415 COLL VENOUS BLD VENIPUNCTURE: CPT | Performed by: FAMILY MEDICINE

## 2023-09-28 PROCEDURE — 99999 PR PBB SHADOW E&M-EST. PATIENT-LVL I: ICD-10-PCS | Mod: PBBFAC,,,

## 2023-09-28 PROCEDURE — 80048 BASIC METABOLIC PNL TOTAL CA: CPT | Performed by: FAMILY MEDICINE

## 2023-09-28 PROCEDURE — 85025 COMPLETE CBC W/AUTO DIFF WBC: CPT | Performed by: FAMILY MEDICINE

## 2023-09-28 PROCEDURE — 82306 VITAMIN D 25 HYDROXY: CPT | Performed by: FAMILY MEDICINE

## 2023-09-28 NOTE — PROGRESS NOTES
Pt here on NV for BP check. Pt's BP wnl 138/80, advised pt to continue current medication regimen. Pt v/u

## 2023-09-29 DIAGNOSIS — D50.9 IRON DEFICIENCY ANEMIA, UNSPECIFIED IRON DEFICIENCY ANEMIA TYPE: ICD-10-CM

## 2023-09-29 DIAGNOSIS — E87.6 HYPOKALEMIA: Primary | ICD-10-CM

## 2023-09-29 RX ORDER — POTASSIUM CHLORIDE 20 MEQ/15ML
40 SOLUTION ORAL DAILY
Qty: 500 ML | Refills: 1 | Status: SHIPPED | OUTPATIENT
Start: 2023-09-29

## 2023-09-29 RX ORDER — FERROUS SULFATE 325(65) MG
325 TABLET, DELAYED RELEASE (ENTERIC COATED) ORAL 2 TIMES DAILY
Qty: 60 TABLET | Refills: 4 | Status: SHIPPED | OUTPATIENT
Start: 2023-09-29

## 2024-04-26 DIAGNOSIS — I10 ESSENTIAL HYPERTENSION: ICD-10-CM

## 2024-04-26 RX ORDER — LABETALOL 200 MG/1
TABLET, FILM COATED ORAL
Qty: 180 TABLET | Refills: 1 | Status: SHIPPED | OUTPATIENT
Start: 2024-04-26

## 2024-04-26 RX ORDER — LABETALOL 200 MG/1
TABLET, FILM COATED ORAL
Qty: 180 TABLET | Refills: 0 | OUTPATIENT
Start: 2024-04-26

## 2024-04-26 NOTE — TELEPHONE ENCOUNTER
No care due was identified.  Health [facility] Embedded Care Due Messages. Reference number: [id number].   4/26/2024 9:03:44 AM CDT

## 2024-04-26 NOTE — TELEPHONE ENCOUNTER
Refill Decision Note  Quick DC. Request already responded to by other means (e.g. phone or fax)    Malgorzata Yoder  is requesting a refill authorization.  Brief Assessment and Rationale for Refill:  Quick Discontinue     Medication Therapy Plan:  duplicate encounter    Medication Reconciliation Completed: No   Comments:           Note composed:4:13 PM 04/26/2024

## 2024-04-26 NOTE — TELEPHONE ENCOUNTER
No care due was identified.  Health [facility] Embedded Care Due Messages. Reference number: [id number].   4/26/2024 9:04:12 AM CDT

## 2024-04-26 NOTE — TELEPHONE ENCOUNTER
Refill Decision Note   Yonasia Paresh  is requesting a refill authorization.  Brief Assessment and Rationale for Refill:  Approve     Medication Therapy Plan:         Comments:     Note composed:4:12 PM 04/26/2024

## 2024-06-06 ENCOUNTER — OFFICE VISIT (OUTPATIENT)
Dept: OBSTETRICS AND GYNECOLOGY | Facility: CLINIC | Age: 39
End: 2024-06-06
Payer: COMMERCIAL

## 2024-06-06 VITALS — BODY MASS INDEX: 40.48 KG/M2 | HEIGHT: 65 IN | WEIGHT: 242.94 LBS

## 2024-06-06 DIAGNOSIS — Z30.41 ENCOUNTER FOR SURVEILLANCE OF CONTRACEPTIVE PILLS: Primary | ICD-10-CM

## 2024-06-06 PROCEDURE — 81025 URINE PREGNANCY TEST: CPT | Mod: S$GLB,,, | Performed by: OBSTETRICS & GYNECOLOGY

## 2024-06-06 PROCEDURE — 3008F BODY MASS INDEX DOCD: CPT | Mod: CPTII,S$GLB,, | Performed by: OBSTETRICS & GYNECOLOGY

## 2024-06-06 PROCEDURE — 99999 PR PBB SHADOW E&M-EST. PATIENT-LVL III: CPT | Mod: PBBFAC,,, | Performed by: OBSTETRICS & GYNECOLOGY

## 2024-06-06 PROCEDURE — 1159F MED LIST DOCD IN RCRD: CPT | Mod: CPTII,S$GLB,, | Performed by: OBSTETRICS & GYNECOLOGY

## 2024-06-06 PROCEDURE — 99213 OFFICE O/P EST LOW 20 MIN: CPT | Mod: S$GLB,,, | Performed by: OBSTETRICS & GYNECOLOGY

## 2024-06-06 PROCEDURE — 1160F RVW MEDS BY RX/DR IN RCRD: CPT | Mod: CPTII,S$GLB,, | Performed by: OBSTETRICS & GYNECOLOGY

## 2024-06-06 RX ORDER — NORETHINDRONE 0.35 MG/1
1 TABLET ORAL DAILY
Qty: 28 TABLET | Refills: 3 | Status: SHIPPED | OUTPATIENT
Start: 2024-06-06 | End: 2025-06-06

## 2024-06-06 NOTE — PROGRESS NOTES
Subjective     Patient ID: Malgorzata Yoder is a 39 y.o. female.    Chief Complaint:  Menstrual Problem      History of Present Illness  HPI  Pt reports that her periods have changed over the past two months.  Menses are normally regular with periods lasting 7 days.  Denies excessive bleeding or cramping.  However, for the past two months, pt has noted prolonged spotting (10 days) instead of periods.  Would like to discuss getting back on birth control.      GYN & OB History  Patient's last menstrual period was 2024 (approximate).   Date of Last Pap: 2023    OB History    Para Term  AB Living   4 2 1 1 1 2   SAB IAB Ectopic Multiple Live Births   1     0 1      # Outcome Date GA Lbr Tre/2nd Weight Sex Type Anes PTL Lv   4             3 Term 20 38w1d  3.16 kg (6 lb 15.5 oz) M CS-LTranv Spinal N LUANN   2  12 36w0d  0.907 kg (2 lb) F CS-Unspec EPI Y       Complications: Hypertension affecting pregnancy, Pre-eclampsia   1 SAB                Review of Systems  Review of Systems   Constitutional:  Negative for activity change, appetite change, chills, fatigue, fever and unexpected weight change.   Respiratory:  Negative for shortness of breath.    Cardiovascular:  Negative for chest pain, palpitations and leg swelling.   Gastrointestinal:  Negative for abdominal pain, bloating, blood in stool, constipation, diarrhea, nausea and vomiting.   Genitourinary:  Positive for menorrhagia and menstrual problem. Negative for dysmenorrhea, dyspareunia, dysuria, flank pain, frequency, genital sores, hematuria, pelvic pain, urgency, vaginal bleeding, vaginal discharge, vaginal pain, urinary incontinence, postcoital bleeding, vaginal dryness and vaginal odor.   Musculoskeletal:  Negative for back pain.   Neurological:  Negative for syncope and headaches.          Objective   Physical Exam:   Constitutional: She is oriented to person, place, and time. She appears well-developed and  well-nourished. No distress.               Genitourinary:    Genitourinary Comments: UPT today Negative                 Neurological: She is alert and oriented to person, place, and time.     Psychiatric: She has a normal mood and affect. Her behavior is normal. Thought content normal.      Pap history:  08/23 - NILM  01/23 - TRAVON 1  08/22 - ASCUS HPV +        Assessment and Plan     1. Encounter for surveillance of contraceptive pills           Plan:  Encounter for surveillance of contraceptive pills  -     norethindrone (MICRONOR) 0.35 mg tablet; Take 1 tablet (0.35 mg total) by mouth once daily.  Dispense: 28 tablet; Refill: 3  -     Pt was counseled on contraception options, including associated risks and benefits of each.  Pt voiced understanding and desires to proceed with Micronor.  Medication dosing, side-effects, risks, benefits, and alternatives were discussed.  Medical history was reviewed and pt is a candidate for Micronor use.    Follow up in about 3 months (around 9/6/2024) for Annual exam.

## 2024-06-21 ENCOUNTER — OFFICE VISIT (OUTPATIENT)
Dept: OBSTETRICS AND GYNECOLOGY | Facility: CLINIC | Age: 39
End: 2024-06-21
Payer: COMMERCIAL

## 2024-06-21 VITALS
DIASTOLIC BLOOD PRESSURE: 82 MMHG | WEIGHT: 241.88 LBS | BODY MASS INDEX: 40.3 KG/M2 | HEIGHT: 65 IN | SYSTOLIC BLOOD PRESSURE: 122 MMHG

## 2024-06-21 DIAGNOSIS — N92.6 IRREGULAR MENSTRUAL CYCLE: Primary | ICD-10-CM

## 2024-06-21 PROCEDURE — 99999 PR PBB SHADOW E&M-EST. PATIENT-LVL III: CPT | Mod: PBBFAC,,,

## 2024-06-21 PROCEDURE — 3074F SYST BP LT 130 MM HG: CPT | Mod: CPTII,S$GLB,,

## 2024-06-21 PROCEDURE — 1159F MED LIST DOCD IN RCRD: CPT | Mod: CPTII,S$GLB,,

## 2024-06-21 PROCEDURE — 99214 OFFICE O/P EST MOD 30 MIN: CPT | Mod: S$GLB,,,

## 2024-06-21 PROCEDURE — 87491 CHLMYD TRACH DNA AMP PROBE: CPT

## 2024-06-21 PROCEDURE — 81025 URINE PREGNANCY TEST: CPT | Mod: S$GLB,,,

## 2024-06-21 PROCEDURE — 3079F DIAST BP 80-89 MM HG: CPT | Mod: CPTII,S$GLB,,

## 2024-06-21 PROCEDURE — 3008F BODY MASS INDEX DOCD: CPT | Mod: CPTII,S$GLB,,

## 2024-06-21 RX ORDER — MEDROXYPROGESTERONE ACETATE 10 MG/1
20 TABLET ORAL DAILY
Qty: 20 TABLET | Refills: 0 | Status: SHIPPED | OUTPATIENT
Start: 2024-06-21 | End: 2024-07-01

## 2024-06-21 RX ORDER — NORETHINDRONE 0.35 MG/1
1 TABLET ORAL DAILY
Qty: 28 TABLET | Refills: 3 | Status: SHIPPED | OUTPATIENT
Start: 2024-06-21 | End: 2025-06-21

## 2024-06-22 LAB
C TRACH DNA SPEC QL NAA+PROBE: NOT DETECTED
N GONORRHOEA DNA SPEC QL NAA+PROBE: NOT DETECTED

## 2024-06-26 NOTE — PROGRESS NOTES
Subjective:       Patient ID: Malgorzata Yoder is a 39 y.o. female.    Chief Complaint:  Metrorrhagia      History of Present Illness  Vaginal Pain  This is a new problem. The current episode started in the past 7 days. The problem occurs daily. The problem has been gradually worsening. The pain is moderate. The problem affects both sides. She is not pregnant. Associated symptoms include abdominal pain and anorexia. Pertinent negatives include no chills, discolored urine, dysuria, fever, frequency, nausea, painful intercourse, rash, urgency or vomiting. The vaginal discharge was bloody, clear and copious. The symptoms are aggravated by activity and tactile pressure. She has tried nothing and warm bath for the symptoms. The treatment provided no relief. She is sexually active. No, her partner does not have an STD. She uses oral contraceptives for contraception. Her menstrual history has been irregular. Her past medical history is significant for a  section, a gynecological surgery, menorrhagia, metrorrhagia, miscarriage and ovarian cysts. There is no history of an abdominal surgery, an ectopic pregnancy, endometriosis, herpes simplex, perineal abscess, PID, an STD, a terminated pregnancy or vaginosis.     Dysfunctional Uterine Bleeding  Patient complains of irregular menses. She had been bleeding regularly. She is now bleeding every 28 days and menses are lasting 10 days. She changes her pad or tampon every a few hours. . Dysmenorrhea:mild, occurring throughout menses. Cyclic symptoms include: none. Current contraception: none. History of infertility: no. History of abnormal Pap smear: no.    Patient recently seen 2 weeks ago in clinic for evaluation and was prescribed progesterone only OCP, patient reports taking pill for 3 days and stopping. She had intercourse and continued bleeding again.       GYN & OB History  Patient's last menstrual period was 2024 (approximate).   Date of Last Pap:  2023    OB History    Para Term  AB Living   4 2 1 1 1 2   SAB IAB Ectopic Multiple Live Births   1     0 1      # Outcome Date GA Lbr Tre/2nd Weight Sex Type Anes PTL Lv   4             3 Term 20 38w1d  3.16 kg (6 lb 15.5 oz) M CS-LTranv Spinal N LUANN   2  12 36w0d  0.907 kg (2 lb) F CS-Unspec EPI Y       Complications: Hypertension affecting pregnancy, Pre-eclampsia   1 SAB                Review of Systems  Review of Systems   Constitutional:  Negative for chills and fever.   Gastrointestinal:  Positive for abdominal pain and anorexia. Negative for nausea and vomiting.   Genitourinary:  Positive for menorrhagia, menstrual problem and vaginal pain. Negative for dysuria, frequency and urgency.   Integumentary:  Negative for rash.           Objective:      Physical Exam:   Constitutional: She is oriented to person, place, and time. She appears well-developed and well-nourished.    HENT:   Head: Normocephalic and atraumatic.    Eyes: Pupils are equal, round, and reactive to light. Conjunctivae and EOM are normal.     Cardiovascular:  Normal rate, regular rhythm and normal heart sounds.             Pulmonary/Chest: Effort normal.        Abdominal: Soft. There is no abdominal tenderness.     Genitourinary:    Genitourinary Comments: deferred             Musculoskeletal: Normal range of motion and moves all extremeties.       Neurological: She is alert and oriented to person, place, and time.    Skin: Skin is warm and dry. She is not diaphoretic.    Psychiatric: She has a normal mood and affect. Her behavior is normal. Judgment and thought content normal.             Assessment:        1. Irregular menstrual cycle               Plan:   Patient instructed to take Provera x10 days, once completed she is to start Micronor.   The use of the oral contraceptive has been fully discussed with the patient. This includes the proper method to initiate and continue the pills, the need for  regular compliance to ensure adequate contraceptive effect, the physiology which make the pill effective, the instructions for what to do in event of a missed pill, and warnings about anticipated minor side effects such as breakthrough spotting, nausea, breast tenderness, weight changes, acne, headaches, etc.  She has been told of the more serious potential side effects such as MI, stroke, and deep vein thrombosis, all of which are very unlikely.  She has been asked to report any signs of such serious problems immediately.  She should back up the pill with a condom during any cycle in which antibiotics are prescribed, and during the first cycle as well. The need for additional protection, such as a condom, to prevent exposure to sexually transmitted diseases has also been discussed- the patient has been clearly reminded that OCP's cannot protect her against diseases such as HIV and others. She understands and wishes to take the medication as prescribed.      Diagnosis and orders this visit:  Irregular menstrual cycle  -     medroxyPROGESTERone (PROVERA) 10 MG tablet; Take 2 tablets (20 mg total) by mouth once daily. for 10 days  Dispense: 20 tablet; Refill: 0  -     norethindrone (MICRONOR) 0.35 mg tablet; Take 1 tablet (0.35 mg total) by mouth once daily.  Dispense: 28 tablet; Refill: 3  -     US Pelvis Comp with Transvag NON-OB (xpd; Future; Expected date: 06/21/2024  -     C. trachomatis/N. gonorrhoeae by AMP DNA  -     CBC Auto Differential; Future; Expected date: 06/21/2024  -     TSH; Future; Expected date: 06/21/2024         Shannan Bejarano NP

## 2024-06-28 ENCOUNTER — HOSPITAL ENCOUNTER (OUTPATIENT)
Dept: RADIOLOGY | Facility: HOSPITAL | Age: 39
Discharge: HOME | End: 2024-06-28
Payer: COMMERCIAL

## 2024-06-28 DIAGNOSIS — N92.6 IRREGULAR MENSTRUAL CYCLE: ICD-10-CM

## 2024-06-28 PROCEDURE — 76830 TRANSVAGINAL US NON-OB: CPT | Mod: 26,,, | Performed by: RADIOLOGY

## 2024-06-28 PROCEDURE — 76856 US EXAM PELVIC COMPLETE: CPT | Mod: 26,,, | Performed by: RADIOLOGY

## 2024-06-28 PROCEDURE — 76830 TRANSVAGINAL US NON-OB: CPT | Mod: TC

## 2024-07-03 ENCOUNTER — PATIENT MESSAGE (OUTPATIENT)
Dept: OBSTETRICS AND GYNECOLOGY | Facility: CLINIC | Age: 39
End: 2024-07-03
Payer: COMMERCIAL

## 2024-07-08 ENCOUNTER — TELEPHONE (OUTPATIENT)
Dept: OBSTETRICS AND GYNECOLOGY | Facility: CLINIC | Age: 39
End: 2024-07-08
Payer: COMMERCIAL

## 2024-07-08 NOTE — TELEPHONE ENCOUNTER
Returned patient's call, verified 2 patient identifiers.       PAtient states she has results from ultrasound but was expecting a call from Carlee TAI on today regarding her results.  (See mychart message).     Please advise

## 2024-07-08 NOTE — TELEPHONE ENCOUNTER
----- Message from Amy Green sent at 7/8/2024 12:50 PM CDT -----  Contact: Malgorzata Lorenzo is calling to receive a call back at .[phone]. Reports wanting ultrasound results explained.

## 2024-07-16 ENCOUNTER — OFFICE VISIT (OUTPATIENT)
Dept: OBSTETRICS AND GYNECOLOGY | Facility: CLINIC | Age: 39
End: 2024-07-16
Payer: COMMERCIAL

## 2024-07-16 DIAGNOSIS — D21.9 FIBROIDS: ICD-10-CM

## 2024-07-16 DIAGNOSIS — N93.8 DUB (DYSFUNCTIONAL UTERINE BLEEDING): Primary | ICD-10-CM

## 2024-07-16 PROCEDURE — 1160F RVW MEDS BY RX/DR IN RCRD: CPT | Mod: CPTII,95,,

## 2024-07-16 PROCEDURE — 1159F MED LIST DOCD IN RCRD: CPT | Mod: CPTII,95,,

## 2024-07-16 PROCEDURE — 99212 OFFICE O/P EST SF 10 MIN: CPT | Mod: 95,,,

## 2024-07-16 NOTE — PROGRESS NOTES
Subjective:       Patient ID: Malgorzata Yoder is a 39 y.o. female.    Chief Complaint:  No chief complaint on file.      History of Present Illness  HPI  The patient location is: home  The chief complaint leading to consultation is: DUB/pelvic US results     Visit type: audiovisual    Face to Face time with patient: 15 minutes of total time spent on the encounter, which includes face to face time and non-face to face time preparing to see the patient (eg, review of tests), Obtaining and/or reviewing separately obtained history, Documenting clinical information in the electronic or other health record, Independently interpreting results (not separately reported) and communicating results to the patient/family/caregiver, or Care coordination (not separately reported).       Each patient to whom he or she provides medical services by telemedicine is:  (1) informed of the relationship between the physician and patient and the respective role of any other health care provider with respect to management of the patient; and (2) notified that he or she may decline to receive medical services by telemedicine and may withdraw from such care at any time.    Notes:   Patient presents for follow up to discuss pelvic US results   She is still with spotting while on progesterone only OCP, she has only been on pill for 1 month     FINDINGS:  Uterus:8.3 x 5.2 x 5.7 cm.  1.8 cm fibroid.  Endometrial stripe measures 11 mm.  Right ovary: Surgically absent  Left ovary: 6 x 3.1 x 3.9 cm.  Simple, almost certainly benign cyst measures up to 4.5 cm.  No significant free fluid is seen in the pelvis.  Impression:  Right oophorectomy.  4.5 cm simple left renal cyst, almost certainly benign O rads 2.  Small fibroid in the uterus.    GYN & OB History  Patient's last menstrual period was 2024 (approximate).   Date of Last Pap: 2023    OB History    Para Term  AB Living   4 2 1 1 1 2   SAB IAB Ectopic Multiple Live  Births   1     0 1      # Outcome Date GA Lbr Tre/2nd Weight Sex Type Anes PTL Lv   4             3 Term 20 38w1d  3.16 kg (6 lb 15.5 oz) M CS-LTranv Spinal N LUANN   2  12 36w0d  0.907 kg (2 lb) F CS-Unspec EPI Y       Complications: Hypertension affecting pregnancy, Pre-eclampsia   1 SAB                Review of Systems  Review of Systems   Genitourinary:  Positive for menorrhagia and menstrual problem.           Objective:      Physical Exam:   Constitutional: She is oriented to person, place, and time. She appears well-developed and well-nourished.    HENT:   Head: Normocephalic and atraumatic.    Eyes: Pupils are equal, round, and reactive to light. Conjunctivae and EOM are normal.      Pulmonary/Chest: Effort normal.                  Musculoskeletal: Normal range of motion.       Neurological: She is alert and oriented to person, place, and time.     Psychiatric: She has a normal mood and affect. Her behavior is normal. Judgment and thought content normal.           Assessment:        1. DUB (dysfunctional uterine bleeding)    2. Fibroids               Plan:   Treatment options for abnormal uterine bleeding were discussed with the patient including OCP's, Depo Provera, Lysteda, Mirena IUD, D&C Hysteroscopy, endometrial ablation, and hysterectomy.    Patient desires ablation   Will scheduled EMB with me and follow-up consult with MD    Diagnosis and orders this visit:  DUB (dysfunctional uterine bleeding)    Fibroids         Shannan Bejarano NP

## 2024-07-17 ENCOUNTER — TELEPHONE (OUTPATIENT)
Dept: OBSTETRICS AND GYNECOLOGY | Facility: CLINIC | Age: 39
End: 2024-07-17
Payer: COMMERCIAL

## 2024-07-17 NOTE — TELEPHONE ENCOUNTER
Contacted patient, verified 2 patient identifiers, informed patient of first available date for EMB Procedure. Patient scheduled 7/30/24 9am [place] location with SUZAN Bejarano. Patient verbalized understanding.

## 2024-07-17 NOTE — TELEPHONE ENCOUNTER
----- Message from Shannan Bejarano NP sent at 7/16/2024  6:04 PM CDT -----  Regarding: schedule EMB  Please get patient scheduled for EMB with me     Thank you  Shannan

## 2024-07-29 ENCOUNTER — TELEPHONE (OUTPATIENT)
Dept: OBSTETRICS AND GYNECOLOGY | Facility: CLINIC | Age: 39
End: 2024-07-29
Payer: COMMERCIAL

## 2024-07-29 NOTE — TELEPHONE ENCOUNTER
Spoke with patient and notified that appointment tomorrow will have to be rescheduled due to provider starting clinic later. Patient verbalized understanding and new appointment is on hold to be scheduled.

## 2024-08-19 ENCOUNTER — PROCEDURE VISIT (OUTPATIENT)
Dept: OBSTETRICS AND GYNECOLOGY | Facility: CLINIC | Age: 39
End: 2024-08-19
Payer: COMMERCIAL

## 2024-08-19 VITALS
WEIGHT: 242.75 LBS | SYSTOLIC BLOOD PRESSURE: 120 MMHG | DIASTOLIC BLOOD PRESSURE: 62 MMHG | BODY MASS INDEX: 40.44 KG/M2 | HEIGHT: 65 IN

## 2024-08-19 DIAGNOSIS — N93.9 ABNORMAL UTERINE BLEEDING (AUB): Primary | ICD-10-CM

## 2024-08-19 LAB
B-HCG UR QL: NEGATIVE
CTP QC/QA: YES

## 2024-08-19 PROCEDURE — 88305 TISSUE EXAM BY PATHOLOGIST: CPT | Mod: 26,,, | Performed by: PATHOLOGY

## 2024-08-19 PROCEDURE — 81025 URINE PREGNANCY TEST: CPT | Mod: S$GLB,,,

## 2024-08-19 PROCEDURE — 88305 TISSUE EXAM BY PATHOLOGIST: CPT | Performed by: PATHOLOGY

## 2024-08-21 LAB
FINAL PATHOLOGIC DIAGNOSIS: NORMAL
GROSS: NORMAL
Lab: NORMAL

## 2024-09-12 ENCOUNTER — TELEPHONE (OUTPATIENT)
Dept: OBSTETRICS AND GYNECOLOGY | Facility: CLINIC | Age: 39
End: 2024-09-12
Payer: COMMERCIAL
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
## 2024-10-02 ENCOUNTER — OFFICE VISIT (OUTPATIENT)
Dept: OBSTETRICS AND GYNECOLOGY | Facility: CLINIC | Age: 39
End: 2024-10-02
Payer: COMMERCIAL

## 2024-10-02 VITALS
HEIGHT: 65 IN | BODY MASS INDEX: 39.45 KG/M2 | SYSTOLIC BLOOD PRESSURE: 160 MMHG | DIASTOLIC BLOOD PRESSURE: 69 MMHG | WEIGHT: 236.75 LBS

## 2024-10-02 DIAGNOSIS — Z01.419 ENCOUNTER FOR ANNUAL ROUTINE GYNECOLOGICAL EXAMINATION: Primary | ICD-10-CM

## 2024-10-02 DIAGNOSIS — N92.6 IRREGULAR MENSTRUAL CYCLE: ICD-10-CM

## 2024-10-02 DIAGNOSIS — Z30.41 ENCOUNTER FOR SURVEILLANCE OF CONTRACEPTIVE PILLS: ICD-10-CM

## 2024-10-02 PROCEDURE — 1159F MED LIST DOCD IN RCRD: CPT | Mod: CPTII,S$GLB,,

## 2024-10-02 PROCEDURE — 3077F SYST BP >= 140 MM HG: CPT | Mod: CPTII,S$GLB,,

## 2024-10-02 PROCEDURE — 1160F RVW MEDS BY RX/DR IN RCRD: CPT | Mod: CPTII,S$GLB,,

## 2024-10-02 PROCEDURE — 99999 PR PBB SHADOW E&M-EST. PATIENT-LVL III: CPT | Mod: PBBFAC,,,

## 2024-10-02 PROCEDURE — 3078F DIAST BP <80 MM HG: CPT | Mod: CPTII,S$GLB,,

## 2024-10-02 PROCEDURE — 3008F BODY MASS INDEX DOCD: CPT | Mod: CPTII,S$GLB,,

## 2024-10-02 PROCEDURE — 99395 PREV VISIT EST AGE 18-39: CPT | Mod: S$GLB,,,

## 2024-10-02 RX ORDER — NORETHINDRONE 0.35 MG/1
1 TABLET ORAL DAILY
Qty: 90 TABLET | Refills: 3 | Status: SHIPPED | OUTPATIENT
Start: 2024-10-02 | End: 2025-10-02
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
## 2024-10-09 DIAGNOSIS — I10 ESSENTIAL HYPERTENSION: ICD-10-CM

## 2024-10-19 DIAGNOSIS — I10 ESSENTIAL HYPERTENSION: ICD-10-CM

## 2024-10-19 NOTE — TELEPHONE ENCOUNTER
Care Due:                  Date            Visit Type   Department     Provider  --------------------------------------------------------------------------------                                EP -                              PRIMARY      [facility]  Sofia Mainampati  Last Visit: 09-      CARE ([facility])   MEDICINE       rFanko  Next Visit: None Scheduled  None         None Found                                                            Last  Test          Frequency    Reason                     Performed    Due Date  --------------------------------------------------------------------------------    Office Visit  15 months..  losartan, potassium......  09-   12-    CMP.........  12 months..  losartan, potassium......  04- 09-    [employer] Embedded Care Due Messages. Reference number: [id number].   10/19/2024 5:55:47 AM CDT

## 2024-10-20 NOTE — TELEPHONE ENCOUNTER
Refill Routing Note   Medication(s) are not appropriate for processing by [facility] for the following reason(s):        Required vitals abnormal  10/02/24 (!) 160/69     Required vitals outdated    [facility] action(s):  Defer   Requires appointment : Yes     Requires labs : Yes             Appointments  past 12m or future 3m with PCP    Date Provider   Last Visit   9/15/2023 Sofia Abdul MD   Next Visit   Visit date not found Sofia Abdul MD   ED visits in past 90 days: 0        Note composed:8:22 AM 10/20/2024

## 2024-10-21 RX ORDER — LABETALOL 200 MG/1
TABLET, FILM COATED ORAL
Qty: 180 TABLET | Refills: 0 | Status: SHIPPED | OUTPATIENT
Start: 2024-10-21

## 2024-10-21 NOTE — TELEPHONE ENCOUNTER
1 refill given . Patient needs appointment for further refills   A-T Advancement Flap Text: The defect edges were debeveled with a #15 scalpel blade.  Given the location of the defect, shape of the defect and the proximity to free margins an A-T advancement flap was deemed most appropriate.  Using a sterile surgical marker, an appropriate advancement flap was drawn incorporating the defect and placing the expected incisions within the relaxed skin tension lines where possible.    The area thus outlined was incised deep to adipose tissue with a #15 scalpel blade.  The skin margins were undermined to an appropriate distance in all directions utilizing iris scissors.

## 2024-11-12 ENCOUNTER — PATIENT MESSAGE (OUTPATIENT)
Dept: INTERNAL MEDICINE | Facility: CLINIC | Age: 39
End: 2024-11-12

## 2024-11-12 ENCOUNTER — OFFICE VISIT (OUTPATIENT)
Dept: INTERNAL MEDICINE | Facility: CLINIC | Age: 39
End: 2024-11-12
Payer: COMMERCIAL

## 2024-11-12 ENCOUNTER — HOSPITAL ENCOUNTER (OUTPATIENT)
Dept: RADIOLOGY | Facility: HOSPITAL | Age: 39
Discharge: HOME | End: 2024-11-12
Payer: COMMERCIAL

## 2024-11-12 VITALS
BODY MASS INDEX: 39.52 KG/M2 | DIASTOLIC BLOOD PRESSURE: 86 MMHG | WEIGHT: 237.19 LBS | HEIGHT: 65 IN | HEART RATE: 82 BPM | TEMPERATURE: 99 F | OXYGEN SATURATION: 97 % | SYSTOLIC BLOOD PRESSURE: 138 MMHG

## 2024-11-12 DIAGNOSIS — M79.605 LOW BACK PAIN RADIATING TO LEFT LOWER EXTREMITY: Primary | ICD-10-CM

## 2024-11-12 DIAGNOSIS — M79.605 LOW BACK PAIN RADIATING TO LEFT LOWER EXTREMITY: ICD-10-CM

## 2024-11-12 DIAGNOSIS — M54.50 LOW BACK PAIN RADIATING TO LEFT LOWER EXTREMITY: Primary | ICD-10-CM

## 2024-11-12 DIAGNOSIS — M54.50 LOW BACK PAIN RADIATING TO LEFT LOWER EXTREMITY: ICD-10-CM

## 2024-11-12 PROCEDURE — 99213 OFFICE O/P EST LOW 20 MIN: CPT | Mod: S$GLB,,,

## 2024-11-12 PROCEDURE — 1160F RVW MEDS BY RX/DR IN RCRD: CPT | Mod: CPTII,S$GLB,,

## 2024-11-12 PROCEDURE — 3008F BODY MASS INDEX DOCD: CPT | Mod: CPTII,S$GLB,,

## 2024-11-12 PROCEDURE — 72100 X-RAY EXAM L-S SPINE 2/3 VWS: CPT | Mod: TC

## 2024-11-12 PROCEDURE — 3075F SYST BP GE 130 - 139MM HG: CPT | Mod: CPTII,S$GLB,,

## 2024-11-12 PROCEDURE — 3079F DIAST BP 80-89 MM HG: CPT | Mod: CPTII,S$GLB,,

## 2024-11-12 PROCEDURE — 1159F MED LIST DOCD IN RCRD: CPT | Mod: CPTII,S$GLB,,

## 2024-11-12 PROCEDURE — 99999 PR PBB SHADOW E&M-EST. PATIENT-LVL V: CPT | Mod: PBBFAC,,,

## 2024-11-12 PROCEDURE — 72100 X-RAY EXAM L-S SPINE 2/3 VWS: CPT | Mod: 26,,, | Performed by: RADIOLOGY

## 2024-11-12 RX ORDER — METHYLPREDNISOLONE 4 MG/1
TABLET ORAL
Qty: 21 EACH | Refills: 0 | Status: SHIPPED | OUTPATIENT
Start: 2024-11-12 | End: 2024-12-03

## 2024-11-12 RX ORDER — NAPROXEN 500 MG/1
500 TABLET ORAL 2 TIMES DAILY WITH MEALS
Qty: 30 TABLET | Refills: 0 | Status: SHIPPED | OUTPATIENT
Start: 2024-11-12

## 2024-11-12 NOTE — PROGRESS NOTES
Subjective:      Patient ID: Malgorzata Yoder is a 39 y.o. female.    CHIEF COMPLAINT:  Patient presents with back pain and numbness in her left leg and foot that has been ongoing for two weeks.    HPI:  Patient reports lower back pain and a numbness/tingling sensation in her left leg for approximately 2 weeks. The pain radiates from her buttocks down to her foot, with numbness and tingling primarily felt on the bottom of her left foot and the back side of her leg. Symptoms have been consistent since onset. She denies any specific injury or trauma but mentions regularly lifting her 53-pound, 4-year-old son. Patient limps when she feels sharp pain while walking.    Patient has a history of lower back problems, which she attributes to having had two epidurals during pregnancy. She has difficulty sleeping due to the tingling sensation in her foot. Pain is exacerbated by pressure, noting that when the affected area is pressed, it travels from her buttocks to her foot.    She has not taken any pain medication for these symptoms over the past 2 weeks. She has used topical analgesic on her back without relief. Patient has been doing light stretches at work to try to alleviate the symptoms.    Patient denies numbness or tingling around the groin area, changes in bowel or bladder habits, recent falls, urinary frequency, dysuria, or malodorous urine.     MEDICAL HISTORY:  Patient has a history of lower back issues since her pregnancy, which are related to having received epidurals twice during that time.    IMAGING:  She underwent a lumbar X-ray in 2015, which revealed that she had minimal retrolisthesis of L4 on L5 vertebra. She expresses she would like to have a new xray today.     ROS:  General: -fever, -chills, -fatigue, -weight gain, -weight loss  Eyes: -vision changes, -redness, -discharge  ENT: -ear pain, -nasal congestion, -sore throat  Cardiovascular: -chest pain, -palpitations, -lower extremity  "edema  Respiratory: -cough, -shortness of breath  Gastrointestinal: -abdominal pain, -nausea, -vomiting, -diarrhea, -constipation, -blood in stool  Genitourinary: -dysuria, -hematuria, -frequency, -difficulty urinating  Musculoskeletal: -joint pain, -muscle pain, +back pain  Skin: -rash, -lesion  Neurological: -headache, -dizziness, +numbness, +tingling  Psychiatric: -anxiety, -depression, +sleep difficulty        Patient Active Problem List   Diagnosis    Essential hypertension    Hypokalemia    Carpal tunnel syndrome on both sides    Severe obesity with body mass index (BMI) of 35.0 to 39.9 with serious comorbidity     Objective:   /86 (BP Location: Right arm, Patient Position: Sitting)   Pulse 82   Temp 98.7 °F (37.1 °C) (Tympanic)   Ht 5' 5" (1.651 m)   Wt 107.6 kg (237 lb 3.4 oz)   LMP 10/16/2024 (Exact Date)   SpO2 97%   BMI 39.47 kg/m²     Physical Exam  Vitals reviewed.   Constitutional:       Appearance: Normal appearance.   Eyes:      Extraocular Movements: Extraocular movements intact.      Pupils: Pupils are equal, round, and reactive to light.   Cardiovascular:      Rate and Rhythm: Normal rate.      Pulses: Normal pulses.   Pulmonary:      Effort: Pulmonary effort is normal.   Abdominal:      General: Bowel sounds are normal.      Palpations: Abdomen is soft.      Tenderness: There is no abdominal tenderness. There is no right CVA tenderness or left CVA tenderness.   Musculoskeletal:      Lumbar back: No edema or signs of trauma. Decreased range of motion. Positive left straight leg raise test. No scoliosis.        Back:       Comments: Decreased forward and posterior bend   Skin:     General: Skin is warm.      Findings: No rash.   Neurological:      Mental Status: She is alert and oriented to person, place, and time. Mental status is at baseline.       Assessment:     1. Low back pain radiating to left lower extremity      Plan:   1. Low back pain radiating to left lower extremity  -     " methylPREDNISolone (MEDROL DOSEPACK) 4 mg tablet; use as directed  Dispense: 21 each; Refill: 0  -     naproxen (NAPROSYN) 500 MG tablet; Take 1 tablet (500 mg total) by mouth 2 (two) times daily with meals.  Dispense: 30 tablet; Refill: 0  -     X-Ray Lumbar Spine AP And Lateral; Future; Expected date: 11/12/2024  -     Urinalysis; Future; Expected date: 11/12/2024      Complete steroid dose pack  Naproxen take twice daily with food as needed for lower back pain   Tylenol 500 mg every 6 hrs as needed for lower back pain  Do not exceed 4000 mg/ day of Tylenol    Back stretches/ exercises at own limit   Avoid picking up/lifting heavy objects, strenuous back activity  Bend at knees not with back   Cool compress to back up to 3x daily do not apply directly to skin     Rule out UTI or treat  Will review x rays when available  No improvement will consider PT, possible referral to Ortho    Follow up in about 2 weeks (around 11/26/2024).    This note was generated with the assistance of ambient listening technology. Verbal consent was obtained by the patient and accompanying visitor(s) for the recording of patient appointment to facilitate this note. I attest to having reviewed and edited the generated note for accuracy, though some syntax or spelling errors may persist. Please contact the author of this note for any clarification.

## 2024-11-12 NOTE — PATIENT INSTRUCTIONS
Steroid dose pack    Naproxen take twice daily with food as needed for lower back pain   Tylenol 500 mg every 6 hrs as needed for lower back pain  Do not exceed 4000 mg/ day of Tylenol    Back stretches/ exercises at own limit   Avoid picking up/lifting heavy objects, strenuous  back activity  Bend at knees not with back   Cool compress to back up to 3x daily do not apply directly to skin

## 2024-12-03 ENCOUNTER — OFFICE VISIT (OUTPATIENT)
Dept: INTERNAL MEDICINE | Facility: CLINIC | Age: 39
End: 2024-12-03
Payer: COMMERCIAL

## 2024-12-03 ENCOUNTER — TELEPHONE (OUTPATIENT)
Dept: PHYSICAL MEDICINE AND REHAB | Facility: CLINIC | Age: 39
End: 2024-12-03
Payer: COMMERCIAL

## 2024-12-03 VITALS
RESPIRATION RATE: 18 BRPM | HEIGHT: 65 IN | SYSTOLIC BLOOD PRESSURE: 140 MMHG | WEIGHT: 241.88 LBS | HEART RATE: 72 BPM | TEMPERATURE: 98 F | DIASTOLIC BLOOD PRESSURE: 90 MMHG | OXYGEN SATURATION: 99 % | BODY MASS INDEX: 40.3 KG/M2

## 2024-12-03 DIAGNOSIS — M54.16 LUMBAR RADICULOPATHY, ACUTE: Primary | ICD-10-CM

## 2024-12-03 DIAGNOSIS — I10 ESSENTIAL HYPERTENSION: ICD-10-CM

## 2024-12-03 PROCEDURE — 99999 PR PBB SHADOW E&M-EST. PATIENT-LVL V: CPT | Mod: PBBFAC,,, | Performed by: PHYSICIAN ASSISTANT

## 2024-12-03 PROCEDURE — 3077F SYST BP >= 140 MM HG: CPT | Mod: CPTII,S$GLB,, | Performed by: PHYSICIAN ASSISTANT

## 2024-12-03 PROCEDURE — 3008F BODY MASS INDEX DOCD: CPT | Mod: CPTII,S$GLB,, | Performed by: PHYSICIAN ASSISTANT

## 2024-12-03 PROCEDURE — 3080F DIAST BP >= 90 MM HG: CPT | Mod: CPTII,S$GLB,, | Performed by: PHYSICIAN ASSISTANT

## 2024-12-03 PROCEDURE — 1159F MED LIST DOCD IN RCRD: CPT | Mod: CPTII,S$GLB,, | Performed by: PHYSICIAN ASSISTANT

## 2024-12-03 PROCEDURE — 1160F RVW MEDS BY RX/DR IN RCRD: CPT | Mod: CPTII,S$GLB,, | Performed by: PHYSICIAN ASSISTANT

## 2024-12-03 PROCEDURE — 99214 OFFICE O/P EST MOD 30 MIN: CPT | Mod: S$GLB,,, | Performed by: PHYSICIAN ASSISTANT

## 2024-12-03 RX ORDER — MELOXICAM 15 MG/1
15 TABLET ORAL DAILY
Qty: 30 TABLET | Refills: 0 | Status: SHIPPED | OUTPATIENT
Start: 2024-12-03

## 2024-12-03 NOTE — PROGRESS NOTES
Subjective:      Patient ID: Malgorzata Yoder is a 39 y.o. female.    Chief Complaint: Follow-up and Leg Pain (Pt states pain in left leg is 8 out of 10/Pt states she is here for a follow up )    HPI    History of Present Illness    CHIEF COMPLAINT: Here today for a 2 week follow up on her back pain and numbness in her left leg. Prescribed medrol dose pack and naproxen at her last visit.   Pt denies any significant improvement with treatment.     Ms. Yoder presents today for back pain and numbness in the leg and feet.    BACK PAIN AND RADICULOPATHY:  She reports persistent numbness in the left leg and foot, radiating down the back of the left leg to the foot, with tingling sensation when walking or standing. The numbness has been constant for four weeks without improvement. She also experiences shooting pains in the midline of the back, radiating to the buttocks and down the leg. Pain is rated as 8/10. She denies recent injuries, falls, bladder/bowel incontinence, or pelvic numbness.    OCCUPATIONAL IMPACT:  She expresses concern about the condition affecting her work performance, which involves prolonged standing and lifting objects, including babies.    MEDICATIONS:  She was previously prescribed a steroid and naproxen. She took Lovenox but discontinued due to side effects of leg and ankle swelling. She currently takes Losartan for blood pressure management and Labetalol twice daily.    BLOOD PRESSURE:  She monitors blood pressure at home.    Medications were reviewed    Patient Active Problem List   Diagnosis    Essential hypertension    Hypokalemia    Carpal tunnel syndrome on both sides    Severe obesity with body mass index (BMI) of 35.0 to 39.9 with serious comorbidity    Lumbar radiculopathy, acute         Current Outpatient Medications:     labetaloL (NORMODYNE) 200 MG tablet, TAKE 1 TABLET BY MOUTH EVERY 12 HOURS, Disp: 180 tablet, Rfl: 0    norethindrone (MICRONOR) 0.35 mg tablet, Take 1 tablet  "(0.35 mg total) by mouth once daily., Disp: 90 tablet, Rfl: 3    potassium chloride 10% (KAYCIEL) 20 mEq/15 mL oral solution, Take 30 mLs (40 mEq total) by mouth once daily., Disp: 500 mL, Rfl: 1    meloxicam (MOBIC) 15 MG tablet, Take 1 tablet (15 mg total) by mouth once daily., Disp: 30 tablet, Rfl: 0    Review of Systems   Constitutional:  Negative for activity change, appetite change, chills, diaphoresis, fatigue, fever and unexpected weight change.   HENT: Negative.  Negative for congestion, hearing loss, postnasal drip, rhinorrhea, sore throat, trouble swallowing and voice change.    Eyes: Negative.  Negative for visual disturbance.   Respiratory: Negative.  Negative for cough, choking, chest tightness and shortness of breath.    Cardiovascular:  Negative for chest pain, palpitations and leg swelling.   Gastrointestinal:  Negative for abdominal distention, abdominal pain, blood in stool, constipation, diarrhea, nausea and vomiting.   Endocrine: Negative for cold intolerance, heat intolerance, polydipsia and polyuria.   Genitourinary: Negative.  Negative for difficulty urinating and frequency.   Musculoskeletal:  Positive for back pain, gait problem and myalgias. Negative for arthralgias and joint swelling.   Skin:  Negative for color change, pallor, rash and wound.   Neurological:  Positive for numbness. Negative for dizziness, tremors, weakness, light-headedness and headaches.   Hematological:  Negative for adenopathy.   Psychiatric/Behavioral:  Negative for behavioral problems, confusion, self-injury, sleep disturbance and suicidal ideas. The patient is not nervous/anxious.      Objective:   BP (!) 140/90 (BP Location: Right arm, Patient Position: Sitting)   Pulse 72   Temp 98.2 °F (36.8 °C) (Tympanic)   Resp 18   Ht 5' 5" (1.651 m)   Wt 109.7 kg (241 lb 13.5 oz)   LMP 10/16/2024 (Exact Date)   SpO2 99%   BMI 40.25 kg/m²     Physical Exam  Vitals and nursing note reviewed.   Constitutional:       " General: She is not in acute distress.     Appearance: Normal appearance. She is well-developed. She is not ill-appearing, toxic-appearing or diaphoretic.   HENT:      Head: Normocephalic and atraumatic.   Cardiovascular:      Rate and Rhythm: Normal rate and regular rhythm.      Heart sounds: Normal heart sounds. No murmur heard.     No friction rub. No gallop.   Pulmonary:      Effort: Pulmonary effort is normal. No respiratory distress.      Breath sounds: Normal breath sounds. No wheezing or rales.   Musculoskeletal:         General: Normal range of motion.        Legs:       Comments: No weakness on exam of either lower extremity   Skin:     General: Skin is warm.      Capillary Refill: Capillary refill takes less than 2 seconds.      Findings: No rash.   Neurological:      Mental Status: She is alert and oriented to person, place, and time.      Motor: No weakness.      Gait: Gait abnormal (walking with limp.).   Psychiatric:         Mood and Affect: Mood normal.         Behavior: Behavior normal.         Thought Content: Thought content normal.         Judgment: Judgment normal.         Assessment:     1. Lumbar radiculopathy, acute    2. Essential hypertension      Plan:   Patient presenting with persistent back pain, leg numbness, and sciatica symptoms for 4 weeks, unresponsive to initial steroid and naproxen treatment  X-ray shows arthritis at sciatic nerve exit point, consistent with clinical presentation  Considered gabapentin for neuropathic symptoms, but opted for conservative approach first  Elevated blood pressure likely due to pain; monitoring required  Labetalol at maximum dose; additional antihypertensive may be necessary if BP remains elevated    Lumbar radiculopathy, acute  -     Ambulatory referral/consult to Physical/Occupational Therapy; Future; Expected date: 12/10/2024  -     meloxicam (MOBIC) 15 MG tablet; Take 1 tablet (15 mg total) by mouth once daily.  Dispense: 30 tablet; Refill: 0  -      Ambulatory referral/consult to Pain Clinic; Future; Expected date: 12/10/2024  -     EMG W/ ULTRASOUND AND NERVE CONDUCTION TEST 1 Extremity; Future  - Educated on the importance of movement and stretching for back pain management.  - Explained the difference between heat (for muscle spasms and healing) and ice (for inflammation and pain) therapy.  - Discussed the role of the pain management specialist in treating the underlying problem rather than just prescribing pain medications.  - Recommend alternating between heat and ice therapy for back pain management.  - Ms. Yoder to implement stretching exercises throughout the workday.  - Recommend using a lumbar support pillow or sciatic strap at work.  - Ms. Yoder to limit sitting to no more than 20 minutes at a time.  - Started Meloxicam (Mobic) daily as an anti-inflammatory medication.  - EMG (nerve conduction study) ordered to locate the source of the back problem.  - Referred to physical therapy for back pain management and work-specific stretching exercises.  - Referred to pain management specialist (back specialist) for further evaluation and potential treatment.    Essential hypertension  -elevated today. Likely secondary to pain. Scheduled to see pcp in a month  - Informed about the potential risks of sustained elevated blood pressure on organs like kidneys and eyes.  - Ms. Yoder to monitor blood pressure at home, reporting if top number stays at 140 or higher, or bottom number at 90 or higher.  - Contact the office if blood pressure remains consistently elevated for potential medication adjustment.    FOLLOW-UP:  - Follow up with Dr. Abdul next month as scheduled, or attempt to schedule an earlier appointment for blood pressure management.       Follow up if symptoms worsen or fail to improve.

## 2024-12-04 ENCOUNTER — CLINICAL SUPPORT (OUTPATIENT)
Dept: REHABILITATION | Facility: HOSPITAL | Age: 39
End: 2024-12-04
Payer: COMMERCIAL

## 2024-12-04 DIAGNOSIS — M54.16 LUMBAR RADICULOPATHY, ACUTE: ICD-10-CM

## 2024-12-04 DIAGNOSIS — R29.898 DECREASED STRENGTH OF LOWER EXTREMITY: Primary | ICD-10-CM

## 2024-12-04 DIAGNOSIS — Z74.09 DECREASED FUNCTIONAL MOBILITY AND ENDURANCE: ICD-10-CM

## 2024-12-04 PROCEDURE — 97110 THERAPEUTIC EXERCISES: CPT

## 2024-12-04 PROCEDURE — 97163 PT EVAL HIGH COMPLEX 45 MIN: CPT

## 2024-12-04 NOTE — PLAN OF CARE
[facility]   Physical Therapy Initial Evaluation      Date: 12/4/2024   Name: Malgorzata Yoder  [facility] Number: [id number]    Therapy Diagnosis:    Encounter Diagnoses   Name Primary?    Lumbar radiculopathy, acute     Decreased strength of lower extremity Yes    Decreased functional mobility and endurance       Physician: Eliz De La Fuente*     Physician Orders: Physical Therapy Evaluation and Treat  Medical Diagnosis from Referral: M54.16 (ICD-10-CM) - Lumbar radiculopathy, acute   Evaluation Date: 12/4/2024  Authorization Period Expiration: 12/03/2025  Plan of Care Expiration: 02/26/2025  Progress Note Due: 01/03/2025  Visit # / Visits authorized: 1/1   FOTO: 1/3     Precautions: Standard    Time In: 9:00 AM  Time Out: 9:35 AM  Total Billable Time (timed & untimed codes): 35 minutes    SUBJECTIVE   Date of onset: a few weeks prior    History of current condition - Malgorzata reports: a few weeks ago at work patient states she began to notice left sided back pain. Initially she states the pain/discomfort was just at her back, however, more recently patient reports the pain began to refer down her left LOWER EXTREMITY down to her last two toes with numbness/tingling. Patient states her pain tends to get worse the more she moves and the only time she gets any relief is laying down flat. Patient states she has a nerve conduction test scheduled in a few weeks as well.    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   Current Level of Function: Independent with all ADL, IADL, community mobility and functional activities with reports of increased pain and need for increased time and frequent breaks.      Dominant Extremity:    [x] Right    [] Left      Medical History:   Past Medical History:   Diagnosis Date    Asthma     childhood    History of ovarian cyst     Hypertension     Hypokalemia 2014    Leg swelling 2023    Paresthesia and pain of both upper extremities 2019       Surgical History:   Malgorzata Yoder  has a past surgical history that includes  section; Mouth surgery; Carpal tunnel release (Bilateral); Oophorectomy (Right, ); Cervical cerclage (N/A, 09/10/2019); and  section (N/A, 2020).    Medications:   Malgorzata has a current medication list which includes the following prescription(s): labetalol, meloxicam, norethindrone, and potassium chloride 10%.    Allergies:   Review of patient's allergies indicates:  No Known Allergies       OBJECTIVE     Sensation:  [x] Intact to Light Touch   [] Impaired:    Palpation: Increased tone and tenderness noted with palpation of left piriformis, left glutes, left hamstrings> right     Posture: Patient presents with postural abnormalities which include:    [x] Forward Head   [x] Increased Lumbar Lordosis   [x] Rounded Shoulder   [] Genu Recurvatum   [] Increased Thoracic Kyphosis [] Genu Valgus   [] Trunk Deviated    [] Pes Planus   [] Scapular Winging    [] Other:     Gait Analysis: The patient ambulated with the following assistive device: none; the pt presents with the following gait abnormalities: trendelenburg, decreased hip extension on  left, and decreased knee flexion on left    RANGE OF MOTION:      Lumbar Right  (spine) Left   Pain/Dysfunction with Movement Goal   Lumbar Flexion (60) WITHIN FUNCTIONAL LIMITS  ---  -   Lumbar Extension (25) WITHIN FUNCTIONAL LIMITS  ---  -   Lumbar Side Bending (25) WITHIN FUNCTIONAL LIMITS  WITHIN FUNCTIONAL LIMITS  Slight pain increase from normal towards left posterior/lateral thigh  -   Lumbar Rotation (25) WITHIN FUNCTIONAL LIMITS  WITHIN FUNCTIONAL LIMITS   -     Hip AROM/PROM Right Left Pain/Dysfunction with Movement Goal   Hip Flexion (120) WITHIN FUNCTIONAL LIMITS  WITHIN FUNCTIONAL LIMITS   -   Hip Extension (30) WITHIN FUNCTIONAL LIMITS  WITHIN FUNCTIONAL LIMITS   -   Hip IR (45) WITHIN FUNCTIONAL LIMITS  WITHIN FUNCTIONAL LIMITS   -   Hip ER (45) WITHIN FUNCTIONAL LIMITS  WITHIN FUNCTIONAL LIMITS   -     STRENGTH:    L/E MMT Right   Left Pain/Dysfunction with Movement Goal   Hip Flexion  5/5 4/5  4+/5 Bilateral   Hip Extension  4+/5 4-/5 Pain in  left posterior/lateral thigh  4+/5 Bilateral   Hip Abduction  5/5 3+/5 Pain in  left posterior/lateral thigh  4+/5 Bilateral   Knee Extension 5/5 5/5  5/5 Bilateral   Knee Flexion 5/5 4+/5  5/5 Bilateral   Ankle Dorsiflexion 5/5 5/5  5/5 Bilateral       JOINT MOBILITY:     Joint Motion Tested Right  (spine) Left  Goal   Lumbar PA Normal - Normal Bilateral   Hip Normal Normal Normal Bilateral       FUNCTION:     CMS Impairment/Limitation/Restriction for FOTO Lumbar Survey    Therapist reviewed FOTO scores for Chermary louia on 12/4/2024.   FOTO documents entered into [employer] - see Media section.    Intake Score: 29         TREATMENT       Chertheodore received the treatments listed below:        CPT Intervention Performed  12/4/2024  Duration / Intensity     TE  Patient educated on plan of care and current condition x Patient verbalized understanding                           NMR                                                    TA                                                                MT        PLAN  Recumbent Bike, Lower trunk rotations, posterior pelvic tilts, supine ball squeeze, supine clamshell, sidelying clamshell, bridge, straight leg raise, supine march, SOFT TISSUE MOBILIZATION to left piriformis area, sciatic nerve glides, leg press            CPT Codes available for Billing:   (00) minutes of Manual therapy (MT) to improve pain and ROM.  (10) minutes of Therapeutic Exercise (TE) to develop strength, endurance, range of motion, and flexibility.  (00) minutes of Neuromuscular Re-Education (NMR)  to improve: Balance, Coordination, Kinesthetic, Sense, Proprioception, and Posture.  (00) minutes of Therapeutic Activities (TA) to improve functional performance.  Unattended Electrical Stimulation (ES) for muscle performance or pain modulation.  BFR: Blood flow restriction applied during exercise  NP or (-): Not Performed      PATIENT EDUCATION AND HOME EXERCISES     Education provided: (10) minutes  PURPOSE: Patient educated on the impairments noted above and the effects of physical therapy intervention to improve overall condition and QOL.   EXERCISE: Patient was educated on all the above exercise prior/during/after for proper posture, positioning, and execution for safe performance with home exercise program.   STRENGTH: Patient educated on the importance of improved core and extremity strength in order to improve alignment of the spine and extremities with static positions and dynamic movement.     Written Home Exercises Provided: no.  Exercises were reviewed and Malgorzata was able to demonstrate them prior to the end of the session.  Malgorzata demonstrated good  understanding of the education provided. See EMR under Patient Instructions for exercises provided during therapy sessions.    ASSESSMENT     Malgorzata is a 39 y.o. female referred to outpatient Physical Therapy with a medical diagnosis of lumbar radiculopathy, acute . Patient presents with impairments  including: impairments list: strength, endurance, joint mobility, posture, gait mechanics, core strength and stability, and functional movement patterns.    Patient prognosis is Good.   Patient will benefit from skilled outpatient Physical Therapy to address the deficits stated above and in the chart below, provide patient/family education, and to maximize patient's level of independence.     Plan of care discussed with patient: Yes  Patient's spiritual, cultural and educational needs considered and patient is agreeable to the plan of care and goals as stated below:     Anticipated Barriers for therapy: co-morbidities, sedentary lifestyle, chronicity of condition, and occupation    Medical Necessity is demonstrated by the following   History  Co-morbidities and personal factors that may impact the plan of care [] LOW: no personal factors / co-morbidities  [] MODERATE: 1-2 personal factors / co-morbidities  [x] HIGH: 3+ personal factors / co-morbidities    Moderate / High Support Documentation:   Past Medical History:   Diagnosis Date    Asthma     childhood    History of ovarian cyst 2010    Hypertension     Hypokalemia 04/23/2014    Leg swelling 04/26/2023    Paresthesia and pain of both upper extremities 06/11/2019         Examination  Body Structures and Functions, activity limitations and participation restrictions that may impact the plan of care [] LOW: addressing 1-2 elements  [] MODERATE: 3+ elements  [x] HIGH: 4+ elements (please support below)    Moderate / High Support Documentation: See evaluation / objective measurements above and FOTO.     Clinical Presentation [] LOW: stable  [] MODERATE: Evolving  [x] HIGH: Unstable     Decision Making/ Complexity Score: high         Goals:  Reviewed:12/4/2024      Short Term Goals:  6 weeks Status  Date Met   PAIN: Patient will report worst pain of 5/10 in order to progress toward max functional ability and improve quality of life. [x] Progressing  [] Met  [] Not  Met    FUNCTION: Patient will demonstrate improved function as indicated by a functional intake score of more than or equal to 38 out of 100 on FOTO. [x] Progressing  [] Met  [] Not Met    MOBILITY: Patient will improve Range of Motion to 50% of stated goals, listed in objective measures above, in order to progress towards independence with functional activities.  [x] Progressing  [] Met  [] Not Met    STRENGTH: Patient will improve strength to 50% of stated goals, listed in objective measures above, in order to progress towards independence with functional activities.  [x] Progressing  [] Met  [] Not Met      Long Term Goals:  12 weeks Status Date Met   PAIN: Patient will report worst pain of 3/10 in order to progress toward max functional ability and improve quality of life [x] Progressing  [] Met  [] Not Met    FUNCTION: Patient will demonstrate improved function as indicated by a functional intake score of more than or equal to 53 out of 100 on FOTO. [x] Progressing  [] Met  [] Not Met    MOBILITY: Patient will improve Range of Motion to stated goals, listed in objective measures above, in order to return to maximal functional potential and improve quality of life. [x] Progressing  [] Met  [] Not Met    STRENGTH: Patient will improve strength to stated goals, listed in objective measures above, in order to improve functional independence and quality of life. [x] Progressing  [] Met  [] Not Met    GAIT: Patient will demonstrate normalized gait mechanics with minimal compensation in order to return to prior level of function. [x] Progressing  [] Met  [] Not Met    Patient will return to normal ADL's, IADL's, community involvement, recreational activities, and work-related activities with less than or equal to 3/10 pain and maximal function.  [x] Progressing  [] Met  [] Not Met      PLAN   Plan of Care Certification: 12/4/2024 to 02/26/2025.    Outpatient Physical Therapy 2 times weekly for 12 weeks to include any  combination of the following interventions: virtual visits, dry needling, modalities, electrical stimulation (IFC, Pre-Mod, Attended with Functional Dry Needling), Cervical/Lumbar Traction, Gait Training, Manual Therapy, Moist Heat/ Ice, Neuromuscular Re-ed, Patient Education, Self Care, Therapeutic Exercise, and Therapeutic Activites     Geovanna Watson, PT, DPT      Physician's Signature: ___________________________________________________

## 2024-12-11 ENCOUNTER — CLINICAL SUPPORT (OUTPATIENT)
Dept: REHABILITATION | Facility: HOSPITAL | Age: 39
End: 2024-12-11
Payer: COMMERCIAL

## 2024-12-11 DIAGNOSIS — R29.898 DECREASED STRENGTH OF LOWER EXTREMITY: Primary | ICD-10-CM

## 2024-12-11 DIAGNOSIS — Z74.09 DECREASED FUNCTIONAL MOBILITY AND ENDURANCE: ICD-10-CM

## 2024-12-11 PROCEDURE — 97110 THERAPEUTIC EXERCISES: CPT

## 2024-12-11 PROCEDURE — 97112 NEUROMUSCULAR REEDUCATION: CPT

## 2024-12-11 NOTE — PROGRESS NOTES
[facility]   Physical Therapy Treatment Note      Name: Malgorzata Yoder  Clinic Number: [id number]    Therapy Diagnosis:   Encounter Diagnoses   Name Primary?    Decreased strength of lower extremity Yes    Decreased functional mobility and endurance      Physician: Eliz De La Fuente*    Visit Date: 12/11/2024    Physician Orders: Physical Therapy Evaluation and Treat  Medical Diagnosis from Referral: M54.16 (ICD-10-CM) - Lumbar radiculopathy, acute   Evaluation Date: 12/4/2024  Authorization Period Expiration: 12/31/2024  Plan of Care Expiration: 02/26/2025  Progress Note Due: 01/03/2025  Visit # / Visits authorized: 1/8 (+1 Evaluation)    FOTO: 1/3      Precautions: Standard    Time In: 11:00 AM  Time Out: 11:50 AM  Total Billable Time: 50 minutes    SUBJECTIVE     Patient reports: she is doing about the same today since the initial evaluation. Patient did report feeling more neural tension on her left LOWER EXTREMITY as compared to right with today's treatment.  She was compliant with home exercise program.  Response to previous treatment: no adverse effects  Functional change: none noted yet    Pain: 8/10  Location: left  low back/hip left lateral LOWER EXTREMITY/ left lateral toes     OBJECTIVE     Objective Measures updated at progress report unless specified.     TREATMENT       CPT Intervention Performed  12/11/2024  Duration / Intensity     TE  Recumbent Bike x 6 minutes level 1     Lower Trunk Rotations x 3 minutes     Self Piriformis Lacrosse Ball Stretch x 2 minutes seated               NMR  Posterior Pelvic Tilts x  3 minutes 3 second holds      Supine Ball Squeeze x  3 minutes 3 second holds      Supine Clamshell x  3 minutes 3 second holds green band     Bridge x 2 x 10      Sidelying Clamshell x 2 x 10 bilateral      Shuttle Squat   Shuttle Squat Piriformis  X  x 3 minutes 5 bands  2 minutes each bilateral 2 bands          Sciatic Nerve Glides next          TA                                                                MT        PLAN   straight leg raise , leg press             CPT Codes available for Billing:   (00) minutes of Manual therapy (MT) to improve pain and ROM.  (12) minutes of Therapeutic Exercise (TE) to develop strength, endurance, range of motion, and flexibility.  (38) minutes of Neuromuscular Re-Education (NMR)  to improve: Balance, Coordination, Kinesthetic, Sense, Proprioception, and Posture.  (00) minutes of Therapeutic Activities (TA) to improve functional performance.  Unattended Electrical Stimulation (ES) for muscle performance or pain modulation.  BFR: Blood flow restriction applied during exercise  NP or (-): Not Performed    PATIENT EDUCATION AND HOME EXERCISES     Home Exercises Provided and Patient Education Provided     Education provided:   PURPOSE: Patient educated on the impairments noted above and the effects of physical therapy intervention to improve overall condition and QOL.   EXERCISE: Patient was educated on all the above exercise prior/during/after for proper posture, positioning, and execution for safe performance with home exercise program.   STRENGTH: Patient educated on the importance of improved core and extremity strength in order to improve alignment of the spine and extremities with static positions and dynamic movement.     Written Home Exercises Provided: Patient instructed to cont prior HEP. Exercises were reviewed and Malgorzata was able to demonstrate them prior to the end of the session.  Malgorzata demonstrated good  understanding of the education provided. See EMR under Patient Instructions for exercises provided during therapy sessions      ASSESSMENT   Patient tolerated first treatment session fairly well. Patient noted to have more neural tension in left lateral LOWER EXTREMITY throughout session, but patient was able to complete all exercises with good form. Patient did require some moderate verbal and tactile  cues for postioning of posterior pelvic tilts and to decrease truncal compensations.     Malgorzata Is progressing well towards her goals.   Patient prognosis is Good.     Patient will continue to benefit from skilled outpatient physical therapy to address the deficits listed in the problem list box on initial evaluation, provide patient/family education and to maximize patient's level of independence in the home and community environment.     Patient's spiritual, cultural and educational needs considered and patient agreeable to plan of care and goals.     Anticipated barriers to physical therapy: co-morbidities, sedentary lifestyle, chronicity of condition, and occupation     Goals:   Reviewed: 12/11/2024      Short Term Goals:  6 weeks Status  Date Met   PAIN: Patient will report worst pain of 5/10 in order to progress toward max functional ability and improve quality of life. [x] Progressing  [] Met  [] Not Met     FUNCTION: Patient will demonstrate improved function as indicated by a functional intake score of more than or equal to 38 out of 100 on FOTO. [x] Progressing  [] Met  [] Not Met     MOBILITY: Patient will improve Range of Motion to 50% of stated goals, listed in objective measures above, in order to progress towards independence with functional activities.  [x] Progressing  [] Met  [] Not Met     STRENGTH: Patient will improve strength to 50% of stated goals, listed in objective measures above, in order to progress towards independence with functional activities.  [x] Progressing  [] Met  [] Not Met        Long Term Goals:  12 weeks Status Date Met   PAIN: Patient will report worst pain of 3/10 in order to progress toward max functional ability and improve quality of life [x] Progressing  [] Met  [] Not Met     FUNCTION: Patient will demonstrate improved function as indicated by a functional intake score of more than or equal to 53 out of 100 on FOTO. [x] Progressing  [] Met  [] Not Met     MOBILITY:  Patient will improve Range of Motion to stated goals, listed in objective measures above, in order to return to maximal functional potential and improve quality of life. [x] Progressing  [] Met  [] Not Met     STRENGTH: Patient will improve strength to stated goals, listed in objective measures above, in order to improve functional independence and quality of life. [x] Progressing  [] Met  [] Not Met     GAIT: Patient will demonstrate normalized gait mechanics with minimal compensation in order to return to prior level of function. [x] Progressing  [] Met  [] Not Met     Patient will return to normal ADL's, IADL's, community involvement, recreational activities, and work-related activities with less than or equal to 3/10 pain and maximal function.  [x] Progressing  [] Met  [] Not Met          PLAN     Monitor response to today's treatment session and progress with Physical Therapy plan of care as indicated.    Geovanna Watson, PT

## 2024-12-13 ENCOUNTER — CLINICAL SUPPORT (OUTPATIENT)
Dept: REHABILITATION | Facility: HOSPITAL | Age: 39
End: 2024-12-13
Payer: COMMERCIAL

## 2024-12-13 DIAGNOSIS — Z74.09 DECREASED FUNCTIONAL MOBILITY AND ENDURANCE: ICD-10-CM

## 2024-12-13 DIAGNOSIS — R29.898 DECREASED STRENGTH OF LOWER EXTREMITY: Primary | ICD-10-CM

## 2024-12-13 PROCEDURE — 97112 NEUROMUSCULAR REEDUCATION: CPT

## 2024-12-13 PROCEDURE — 97110 THERAPEUTIC EXERCISES: CPT

## 2024-12-13 NOTE — PROGRESS NOTES
[facility]   Physical Therapy Treatment Note      Name: Malgorzata Yoder  [facility] Number: [id number]    Therapy Diagnosis:   Encounter Diagnoses   Name Primary?    Decreased strength of lower extremity Yes    Decreased functional mobility and endurance      Physician: Eliz De La Fuente*    Visit Date: 12/13/2024    Physician Orders: Physical Therapy Evaluation and Treat  Medical Diagnosis from Referral: M54.16 (ICD-10-CM) - Lumbar radiculopathy, acute   Evaluation Date: 12/4/2024  Authorization Period Expiration: 12/31/2024  Plan of Care Expiration: 02/26/2025  Progress Note Due: 01/03/2025  Visit # / Visits authorized: 2/8 (+1 Evaluation)    FOTO: 1/3      Precautions: Standard    Time In: 9:55 AM  Time Out: 10:45 AM  Total Billable Time: 50 minutes    SUBJECTIVE     Patient reports: she felt ok during last session and just sightly better afterwards. Patient states the numbness in her left toes did return.  She was compliant with home exercise program.  Response to previous treatment: no adverse effects  Functional change: none noted yet    Pain: 8/10  Location: left  low back/hip left lateral LOWER EXTREMITY/ left lateral toes     OBJECTIVE     Objective Measures updated at progress report unless specified.     TREATMENT       CPT Intervention Performed  12/13/2024  Duration / Intensity     TE  Recumbent Bike x 6 minutes level 1     Lower Trunk Rotations x 3 minutes     Self Piriformis Lacrosse Ball Stretch x 2 minutes seated               NMR  Posterior Pelvic Tilts x  3 minutes 3 second holds      Supine Ball Squeeze x  3 minutes 3 second holds      Supine Clamshell x  3 minutes 3 second holds green band     Bridge x 2 x 10      Sidelying Clamshell x 2 x 10 bilateral      Shuttle Squat   Shuttle Squat Piriformis  X  x 3 minutes 5 bands  2 minutes each bilateral 2 bands          Sciatic Nerve Glides x 3 x 10 bilateral         TA                                                                MT        PLAN   straight leg raise , leg press          CPT Codes available for Billing:   (00) minutes of Manual therapy (MT) to improve pain and ROM.  (12) minutes of Therapeutic Exercise (TE) to develop strength, endurance, range of motion, and flexibility.  (38) minutes of Neuromuscular Re-Education (NMR)  to improve: Balance, Coordination, Kinesthetic, Sense, Proprioception, and Posture.  (00) minutes of Therapeutic Activities (TA) to improve functional performance.  Unattended Electrical Stimulation (ES) for muscle performance or pain modulation.  BFR: Blood flow restriction applied during exercise  NP or (-): Not Performed    PATIENT EDUCATION AND HOME EXERCISES     Home Exercises Provided and Patient Education Provided     Education provided:   PURPOSE: Patient educated on the impairments noted above and the effects of physical therapy intervention to improve overall condition and QOL.   EXERCISE: Patient was educated on all the above exercise prior/during/after for proper posture, positioning, and execution for safe performance with home exercise program.   STRENGTH: Patient educated on the importance of improved core and extremity strength in order to improve alignment of the spine and extremities with static positions and dynamic movement.     Written Home Exercises Provided: Patient instructed to cont prior HEP. Exercises were reviewed and Malgorzata was able to demonstrate them prior to the end of the session.  Malgorzata demonstrated good  understanding of the education provided. See EMR under Patient Instructions for exercises provided during therapy sessions      ASSESSMENT   Added sciatic nerve glides to focus on decreasing neural tension to which patient reported more discomfort on left as to be expected with her familiar pain. Otherwise, patient tolerated session well again today and encouraged patient to continue moving around to decrease the chance of symptoms worsening.      Malgorzata Is progressing well towards her goals.   Patient prognosis is Good.     Patient will continue to benefit from skilled outpatient physical therapy to address the deficits listed in the problem list box on initial evaluation, provide patient/family education and to maximize patient's level of independence in the home and community environment.     Patient's spiritual, cultural and educational needs considered and patient agreeable to plan of care and goals.     Anticipated barriers to physical therapy: co-morbidities, sedentary lifestyle, chronicity of condition, and occupation     Goals:   Reviewed: 12/13/2024      Short Term Goals:  6 weeks Status  Date Met   PAIN: Patient will report worst pain of 5/10 in order to progress toward max functional ability and improve quality of life. [x] Progressing  [] Met  [] Not Met     FUNCTION: Patient will demonstrate improved function as indicated by a functional intake score of more than or equal to 38 out of 100 on FOTO. [x] Progressing  [] Met  [] Not Met     MOBILITY: Patient will improve Range of Motion to 50% of stated goals, listed in objective measures above, in order to progress towards independence with functional activities.  [x] Progressing  [] Met  [] Not Met     STRENGTH: Patient will improve strength to 50% of stated goals, listed in objective measures above, in order to progress towards independence with functional activities.  [x] Progressing  [] Met  [] Not Met        Long Term Goals:  12 weeks Status Date Met   PAIN: Patient will report worst pain of 3/10 in order to progress toward max functional ability and improve quality of life [x] Progressing  [] Met  [] Not Met     FUNCTION: Patient will demonstrate improved function as indicated by a functional intake score of more than or equal to 53 out of 100 on FOTO. [x] Progressing  [] Met  [] Not Met     MOBILITY: Patient will improve Range of Motion to stated goals, listed in objective measures  above, in order to return to maximal functional potential and improve quality of life. [x] Progressing  [] Met  [] Not Met     STRENGTH: Patient will improve strength to stated goals, listed in objective measures above, in order to improve functional independence and quality of life. [x] Progressing  [] Met  [] Not Met     GAIT: Patient will demonstrate normalized gait mechanics with minimal compensation in order to return to prior level of function. [x] Progressing  [] Met  [] Not Met     Patient will return to normal ADL's, IADL's, community involvement, recreational activities, and work-related activities with less than or equal to 3/10 pain and maximal function.  [x] Progressing  [] Met  [] Not Met          PLAN     Monitor response to today's treatment session and progress with Physical Therapy plan of care as indicated.    Geovanna Watson, PT

## 2024-12-17 ENCOUNTER — CLINICAL SUPPORT (OUTPATIENT)
Dept: REHABILITATION | Facility: HOSPITAL | Age: 39
End: 2024-12-17
Payer: COMMERCIAL

## 2024-12-17 DIAGNOSIS — Z74.09 DECREASED FUNCTIONAL MOBILITY AND ENDURANCE: ICD-10-CM

## 2024-12-17 DIAGNOSIS — R29.898 DECREASED STRENGTH OF LOWER EXTREMITY: Primary | ICD-10-CM

## 2024-12-17 PROCEDURE — 97110 THERAPEUTIC EXERCISES: CPT

## 2024-12-17 PROCEDURE — 97112 NEUROMUSCULAR REEDUCATION: CPT

## 2024-12-17 NOTE — PROGRESS NOTES
[facility] OUTPAT[facility] THERAPY AND [facility]LL[facility]   Physical Therapy Treatment Note      Name: Malgorzata Yoder  [facility] Number: [id number]    Therapy Diagnosis:   Encounter Diagnoses   Name Primary?    Decreased strength of lower extremity Yes    Decreased functional mobility and endurance      Physician: Eliz De La Fuente*    Visit Date: 12/17/2024    Physician Orders: Physical Therapy Evaluation and Treat  Medical Diagnosis from Referral: M54.16 (ICD-10-CM) - Lumbar radiculopathy, acute   Evaluation Date: 12/4/2024  Authorization Period Expiration: 12/31/2024  Plan of Care Expiration: 02/26/2025  Progress Note Due: 01/03/2025  Visit # / Visits authorized: 3/8 (+1 Evaluation)    FOTO: 1/3      Precautions: Standard    Time In: 9:55 AM  Time Out: 10:45 AM  Total Billable Time: 50 minutes    SUBJECTIVE     Patient reports: she continues to feel ok during and right after her PHYSICAL THERAPY, however, patient states her pain comes back pretty intensely at work. Patient reports she tries to take breaks and stretch as she can. Patient reports she has an EMG study scheduled for tomorrow to determine cause of her nerve pain.  She was compliant with home exercise program.  Response to previous treatment: no adverse effects  Functional change: none noted yet    Pain: 8/10  Location: left  low back/hip left lateral LOWER EXTREMITY/ left lateral toes     OBJECTIVE     Objective Measures updated at progress report unless specified.     TREATMENT       CPT Intervention Performed  12/17/2024  Duration / Intensity     TE  Recumbent Bike x 6 minutes level 1     Lower Trunk Rotations x 3 minutes     Self Piriformis Lacrosse Ball Stretch  2 minutes seated               NMR  Posterior Pelvic Tilts x  3 minutes 3 second holds      Supine Ball Squeeze x  3 minutes 3 second holds      Supine Clamshell -  3 minutes 3 second holds green band    Sidelying Hip Abduction        Bridge x 2 x 10      Sidelying Clamshell x 2 x 10 bilateral       Shuttle Squat   Shuttle Squat Piriformis  X  x 3 minutes 5 bands  2 minutes each bilateral 3 bands          Sciatic Nerve Glides x 3 x 10 bilateral    Straight Leg Raises x 3 x 10 bilateral    Sidelying Hip Abduction  x 2 x 10 bilateral         TA                                                               MT        PLAN   straight leg raise , leg press          CPT Codes available for Billing:   (00) minutes of Manual therapy (MT) to improve pain and ROM.  (12) minutes of Therapeutic Exercise (TE) to develop strength, endurance, range of motion, and flexibility.  (38) minutes of Neuromuscular Re-Education (NMR)  to improve: Balance, Coordination, Kinesthetic, Sense, Proprioception, and Posture.  (00) minutes of Therapeutic Activities (TA) to improve functional performance.  Unattended Electrical Stimulation (ES) for muscle performance or pain modulation.  BFR: Blood flow restriction applied during exercise  NP or (-): Not Performed    PATIENT EDUCATION AND HOME EXERCISES     Home Exercises Provided and Patient Education Provided     Education provided:   PURPOSE: Patient educated on the impairments noted above and the effects of physical therapy intervention to improve overall condition and QOL.   EXERCISE: Patient was educated on all the above exercise prior/during/after for proper posture, positioning, and execution for safe performance with home exercise program.   STRENGTH: Patient educated on the importance of improved core and extremity strength in order to improve alignment of the spine and extremities with static positions and dynamic movement.     Written Home Exercises Provided: Patient instructed to cont prior HEP. Exercises were reviewed and Malgorzata was able to demonstrate them prior to the end of the session.  Malgorzata demonstrated good  understanding of the education provided. See EMR under Patient Instructions for exercises provided during therapy sessions      ASSESSMENT   Progressed patient  with straight leg raises and sidelying hip abduction to continue working on hip/core stability. Patient noted to have more difficulty with left LOWER EXTREMITY straight leg raises compared to right, but was able to complete. Plan to re-assess after patient visits doctor for EMG study.    Malgorzata Is progressing well towards her goals.   Patient prognosis is Good.     Patient will continue to benefit from skilled outpatient physical therapy to address the deficits listed in the problem list box on initial evaluation, provide patient/family education and to maximize patient's level of independence in the home and community environment.     Patient's spiritual, cultural and educational needs considered and patient agreeable to plan of care and goals.     Anticipated barriers to physical therapy: co-morbidities, sedentary lifestyle, chronicity of condition, and occupation     Goals:   Reviewed: 12/17/2024      Short Term Goals:  6 weeks Status  Date Met   PAIN: Patient will report worst pain of 5/10 in order to progress toward max functional ability and improve quality of life. [x] Progressing  [] Met  [] Not Met     FUNCTION: Patient will demonstrate improved function as indicated by a functional intake score of more than or equal to 38 out of 100 on FOTO. [x] Progressing  [] Met  [] Not Met     MOBILITY: Patient will improve Range of Motion to 50% of stated goals, listed in objective measures above, in order to progress towards independence with functional activities.  [x] Progressing  [] Met  [] Not Met     STRENGTH: Patient will improve strength to 50% of stated goals, listed in objective measures above, in order to progress towards independence with functional activities.  [x] Progressing  [] Met  [] Not Met        Long Term Goals:  12 weeks Status Date Met   PAIN: Patient will report worst pain of 3/10 in order to progress toward max functional ability and improve quality of life [x] Progressing  [] Met  [] Not  Met     FUNCTION: Patient will demonstrate improved function as indicated by a functional intake score of more than or equal to 53 out of 100 on FOTO. [x] Progressing  [] Met  [] Not Met     MOBILITY: Patient will improve Range of Motion to stated goals, listed in objective measures above, in order to return to maximal functional potential and improve quality of life. [x] Progressing  [] Met  [] Not Met     STRENGTH: Patient will improve strength to stated goals, listed in objective measures above, in order to improve functional independence and quality of life. [x] Progressing  [] Met  [] Not Met     GAIT: Patient will demonstrate normalized gait mechanics with minimal compensation in order to return to prior level of function. [x] Progressing  [] Met  [] Not Met     Patient will return to normal ADL's, IADL's, community involvement, recreational activities, and work-related activities with less than or equal to 3/10 pain and maximal function.  [x] Progressing  [] Met  [] Not Met          PLAN     Monitor response to today's treatment session and progress with Physical Therapy plan of care as indicated.    Geovanna Watson, PT

## 2024-12-18 ENCOUNTER — OFFICE VISIT (OUTPATIENT)
Dept: PHYSICAL MEDICINE AND REHAB | Facility: CLINIC | Age: 39
End: 2024-12-18
Payer: COMMERCIAL

## 2024-12-18 VITALS — WEIGHT: 241.88 LBS | HEIGHT: 65 IN | BODY MASS INDEX: 40.3 KG/M2 | RESPIRATION RATE: 13 BRPM

## 2024-12-18 DIAGNOSIS — M54.16 LUMBAR RADICULOPATHY: ICD-10-CM

## 2024-12-18 PROCEDURE — 99999 PR PBB SHADOW E&M-EST. PATIENT-LVL III: CPT | Mod: PBBFAC,,, | Performed by: PHYSICAL MEDICINE & REHABILITATION

## 2024-12-18 NOTE — PROGRESS NOTES
[facility]  Department of Physiatry-EMG  [facility]   [address] [place] [address]         Full Name: Malgorzata Yoder YOB: 1985  Patient ID: [id number]      Visit Date: 12/18/2024 09:18  Age: 39 Years  Examining Physician:   Referring Physician: SUZAN Aragon  Conclusion: lower extr    Chief Complaint   Patient presents with    Leg Pain       HPI: This is a 39 y.o.  female being seen in clinic today for evaluation of chronic low back achy with radiation into her left leg to the foot. Her symptoms are worse with increased standing/walking.  Meds and rest have provided some relief. She has started PT    History obtained from patient    Past family, medical, social, and surgical history reviewed in chart    Review of Systems:     General- denies lethargy, weight change, fever, chills  Head/neck- denies swallowing difficulties  ENT- denies hearing changes  Cardiovascular-denies chest pain  Pulmonary- denies shortness of breath  GI- denies constipation or bowel incontinence  - denies bladder incontinence  Skin- denies wounds or rashes  Musculoskeletal- denies weakness, + pain  Neurologic- + numbness and tingling  Psychiatric- denies depressive or psychotic features, denies anxiety  Lymphatic-denies swelling  Endocrine- denies hypoglycemic symptoms/DM history  All other pertinent systems negative     Physical Examination:  General: Well developed, well nourished female, NAD  HEENT:NCAT EOMI bilaterally   Pulmonary:Normal respirations    Spinal Examination: CERVICAL  Active ROM is within normal limits.  Inspection: No deformity of spinal alignment.    Spinal Examination: LUMBAR or THORACIC  Active ROM is within normal limits.  Inspection: No deformity of spinal alignment.    SLR Neg    Bilateral Upper and Lower Extremities:  Pulses are 2+ at radial bilaterally.  Shoulder/Elbow/Wrist/Hand ROM   Hip/Knee/Ankle ROM wnl  Bilateral Extremities  show normal capillary refill.  No signs of cyanosis, rubor, edema, skin changes, or dysvascular changes of appendages.  Nails appear intact.    Neurological Exam:  Cranial Nerves:  II-XII grossly intact    Manual Muscle Testing: (Motor 5=normal)  5/5 strength bilateral lower extremities    No focal atrophy is noted of either lower extremity.    Bilateral Reflexes:  No clonus at knee or ankle.    Sensation: tested to light touch  - intact in legs except dec at left lower leg in S1 dist.    Gait: Narrow base and good arm swing.      Entire procedure explained to patient prior to proceeding.  Verbal consent obtained      Sensory NCS      Nerve / Sites Rec. Site Onset Lat Peak Lat NP Amp Segments Distance Velocity     ms ms µV  mm m/s   L Sural - Ankle (Calf)      Calf Ankle 3.04 3.77 17.7 Calf - Ankle 140 46   L Superficial peroneal - Ankle      Lat leg Ankle 2.83 3.75 11.7 Lat leg - Ankle 140 49           Motor NCS      Nerve / Sites Muscle Latency Amplitude Amp % Duration Segments Distance Lat Diff Velocity     ms mV % ms  mm ms m/s   L Peroneal - EDB      Ankle EDB 4.21 10.2 100 5.67 Ankle - EDB 80        Fib head EDB 10.46 10.2 100 6.94 Fib head - Ankle 320 6.25 51      Pop fossa EDB 12.15 9.6 94.8 6.12 Pop fossa - Fib head 90 1.69 53   L Tibial - AH      Ankle AH 4.88 9.1 100 6.06 Ankle - AH 80        Pop fossa AH 13.13 7.0 76.5 7.06 Pop fossa - Ankle 400 8.25 48           EMG Summary Table     Spontaneous MUAP Recruitment   Muscle Nerve Roots IA Fib PSW Fasc H.F. Amp Dur. PPP Pattern   L. Rectus femoris Femoral L2-L4 N None None None None N N N N   L. Extensor digitorum brevis Tibial L5-S1 N None None None None N N 1+ 1+   L. Abductor hallucis Tibial S1-S2 1+ 1+ None None None N 1+ 1+ 1+         INTERPRETATION  -Left superficial peroneal sensory nerve conduction study showed normal peak latency and amplitude  -Left sural sensory nerve conduction study showed normal peak latency and amplitude  -Left peroneal motor  nerve conduction study showed normal latency, amplitude, and conduction velocity  -Left tibial motor nerve conduction study showed normal latency, amplitude, and conduction velocity  -Needle EMG examination performed to above mentioned muscles       IMPRESSION  ABNORMAL study  2. There is electrodiagnostic evidence of an acute on chronic radiculopathy of the S1 nerve root and a mild chronic radiculopathy of the L5 nerve root    PLAN  Discussed in detail for greater than 30 minutes about diagnosis and treatment plan    1. Follow up with referring provider: Eliz De La Fuente  2. Handouts on radic and exercise provided. Cont PT and she has upcoming IPM appt.  3. This study is good for one year. If symptoms worsen or do not improve, please re-consult.    Ro Allen M.D.  Physical Medicine and Rehab

## 2024-12-29 DIAGNOSIS — M54.16 LUMBAR RADICULOPATHY, ACUTE: ICD-10-CM

## 2025-01-06 RX ORDER — MELOXICAM 15 MG/1
15 TABLET ORAL
Qty: 30 TABLET | Refills: 0 | Status: SHIPPED | OUTPATIENT
Start: 2025-01-06

## 2025-01-10 ENCOUNTER — CLINICAL SUPPORT (OUTPATIENT)
Dept: REHABILITATION | Facility: HOSPITAL | Age: 40
End: 2025-01-10
Payer: COMMERCIAL

## 2025-01-10 DIAGNOSIS — R29.898 DECREASED STRENGTH OF LOWER EXTREMITY: Primary | ICD-10-CM

## 2025-01-10 DIAGNOSIS — Z74.09 DECREASED FUNCTIONAL MOBILITY AND ENDURANCE: ICD-10-CM

## 2025-01-10 PROCEDURE — 97140 MANUAL THERAPY 1/> REGIONS: CPT

## 2025-01-10 PROCEDURE — 97112 NEUROMUSCULAR REEDUCATION: CPT

## 2025-01-10 PROCEDURE — 97110 THERAPEUTIC EXERCISES: CPT

## 2025-01-10 NOTE — PROGRESS NOTES
[facility]   Physical Therapy Treatment Note + Progress Report     Name: Malgorzata Yoder  Clinic Number: [id number]    Therapy Diagnosis:   Encounter Diagnoses   Name Primary?    Decreased strength of lower extremity Yes    Decreased functional mobility and endurance      Physician: Eliz De La Fuente*    Visit Date: 1/10/2025    Physician Orders: Physical Therapy Evaluation and Treat  Medical Diagnosis from Referral: M54.16 (ICD-10-CM) - Lumbar radiculopathy, acute   Evaluation Date: 12/4/2024  Authorization Period Expiration: 12/31/2024  Plan of Care Expiration: 02/26/2025  Progress Note Due: 02/09/2025  Visit # / Visits authorized: 1/20 (+1 Evaluation + 3 treatment)    FOTO: 2/3      Precautions: Standard    Time In: 9:35 AM  Time Out: 10:30 AM  Total Billable Time: 55 minutes    SUBJECTIVE     Patient reports: she had her EMG appointment which showed some disturbance near her L5 and S1, however, patient states her doctor told her she does not have any nerve damage. Patient rates her pain slightly less than at last visit.  She was compliant with home exercise program.  Response to previous treatment: no adverse effects  Functional change: none noted yet    Pain: 7/10  Location: left  low back/hip left lateral LOWER EXTREMITY/ left lateral toes     OBJECTIVE     Objective Measures updated at progress report unless specified.     *denotes change since initial evaluation, re-tested on 01/10/2025    Sensation:  [x] Intact to Light Touch                         [] Impaired:     Palpation: Increased tone and tenderness noted with palpation of left piriformis, left glutes, left hamstrings> right      Posture: Patient presents with postural abnormalities which include:               [x] Forward Head                                [x] Increased Lumbar Lordosis              [x] Rounded Shoulder                         [] Genu Recurvatum              [] Increased Thoracic Kyphosis         [] Genu Valgus              [] Trunk Deviated                              [] Pes Planus              [] Scapular Winging                          [] Other:      Gait Analysis: The patient ambulated with the following assistive device: none; the pt presents with the following gait abnormalities: trendelenburg, decreased hip extension on left, and decreased knee flexion on left     STRENGTH:     L/E MMT Right    Left Pain/Dysfunction with Movement Goal   Hip Flexion  5/5 4+/5*   4+/5 Bilateral   Hip Extension  4+/5 4/5* Pain in  left posterior/lateral thigh  4+/5 Bilateral   Hip Abduction  5/5 4-/5* Pain in  left posterior/lateral thigh  4+/5 Bilateral   Knee Extension 5/5 5/5   5/5 Bilateral   Knee Flexion 5/5 4+/5   5/5 Bilateral   Ankle Dorsiflexion 5/5 5/5   5/5 Bilateral         FUNCTION:      CMS Impairment/Limitation/Restriction for FOTO Lumbar Survey     Therapist reviewed FOTO scores for [place] on 01/10/2025.   FOTO documents entered into [employer] - see Media section.     Intake Score: 16*           TREATMENT       CPT Intervention Performed  1/10/2025  Duration / Intensity     TE  Recumbent Bike x 6 minutes level 1     Lower Trunk Rotations x 3 minutes     Self Piriformis Lacrosse Ball Stretch  2 minutes seated    Objectives Re-assessed x 5 minutes         NMR  Posterior Pelvic Tilts x  3 minutes 3 second holds      Supine Ball Squeeze x  3 minutes 3 second holds      Supine Clamshell x  3 minutes 3 second holds black band    Sidelying Hip Abduction        Bridge x 2 x 10      Sidelying Clamshell x 2 x 10 bilateral      Shuttle Squat   Shuttle Squat Piriformis   3 minutes 5 bands  2 minutes each bilateral 3 bands          Sciatic Nerve Glides  3 x 10 bilateral    Straight Leg Raises x 2 x 10 bilateral    Sidelying Hip Abduction   2 x 10 bilateral         TA                                                               MT  Cupping with lumbar flexion  x  8 minutes   PLAN   straight leg raise , leg press           CPT Codes available for Billing:   (08) minutes of Manual therapy (MT) to improve pain and ROM.  (17) minutes of Therapeutic Exercise (TE) to develop strength, endurance, range of motion, and flexibility.  (30) minutes of Neuromuscular Re-Education (NMR)  to improve: Balance, Coordination, Kinesthetic, Sense, Proprioception, and Posture.  (00) minutes of Therapeutic Activities (TA) to improve functional performance.  Unattended Electrical Stimulation (ES) for muscle performance or pain modulation.  BFR: Blood flow restriction applied during exercise  NP or (-): Not Performed    PATIENT EDUCATION AND HOME EXERCISES     Home Exercises Provided and Patient Education Provided     Education provided:   PURPOSE: Patient educated on the impairments noted above and the effects of physical therapy intervention to improve overall condition and QOL.   EXERCISE: Patient was educated on all the above exercise prior/during/after for proper posture, positioning, and execution for safe performance with home exercise program.   STRENGTH: Patient educated on the importance of improved core and extremity strength in order to improve alignment of the spine and extremities with static positions and dynamic movement.     Written Home Exercises Provided: Patient instructed to cont prior HEP. Exercises were reviewed and Malgorzata was able to demonstrate them prior to the end of the session.  Malgorzata demonstrated good  understanding of the education provided. See EMR under Patient Instructions for exercises provided during therapy sessions      ASSESSMENT   Since initial evaluation patient demonstrates moderate improvements in left hip flexion, left hip abduction, and left hip extension strength. Despite these improvements, patient with more reported functional limitations per FOTO scores. This may be due to patient having better understanding of limitations since beginning PHYSICAL THERAPY. Because patient has made some  improvements, patient may continue to benefit from skilled therapy to continue addressing remaining deficits and pain levels.    Malgorzata Is progressing well towards her goals.   Patient prognosis is Good.     Patient will continue to benefit from skilled outpatient physical therapy to address the deficits listed in the problem list box on initial evaluation, provide patient/family education and to maximize patient's level of independence in the home and community environment.     Patient's spiritual, cultural and educational needs considered and patient agreeable to plan of care and goals.     Anticipated barriers to physical therapy: co-morbidities, sedentary lifestyle, chronicity of condition, and occupation     Goals:   Reviewed: 1/10/2025      Short Term Goals:  6 weeks Status  Date Met   PAIN: Patient will report worst pain of 5/10 in order to progress toward max functional ability and improve quality of life. [x] Progressing  [] Met  [] Not Met  01/10/2025   FUNCTION: Patient will demonstrate improved function as indicated by a functional intake score of more than or equal to 38 out of 100 on FOTO. [x] Progressing  [] Met  [] Not Met 01/10/2025   MOBILITY: Patient will improve Range of Motion to 50% of stated goals, listed in objective measures above, in order to progress towards independence with functional activities.  [x] Progressing  [] Met  [] Not Met 01/10/2025    STRENGTH: Patient will improve strength to 50% of stated goals, listed in objective measures above, in order to progress towards independence with functional activities.  [] Progressing  [x] Met  [] Not Met  01/10/2025      Long Term Goals:  12 weeks Status Date Met   PAIN: Patient will report worst pain of 3/10 in order to progress toward max functional ability and improve quality of life [x] Progressing  [] Met  [] Not Met  01/10/2025   FUNCTION: Patient will demonstrate improved function as indicated by a functional intake score of more  than or equal to 53 out of 100 on FOTO. [x] Progressing  [] Met  [] Not Met 01/10/2025    MOBILITY: Patient will improve Range of Motion to stated goals, listed in objective measures above, in order to return to maximal functional potential and improve quality of life. [x] Progressing  [] Met  [] Not Met  01/10/2025   STRENGTH: Patient will improve strength to stated goals, listed in objective measures above, in order to improve functional independence and quality of life. [x] Progressing  [] Met  [] Not Met 01/10/2025   GAIT: Patient will demonstrate normalized gait mechanics with minimal compensation in order to return to prior level of function. [x] Progressing  [] Met  [] Not Met 01/10/2025    Patient will return to normal ADL's, IADL's, community involvement, recreational activities, and work-related activities with less than or equal to 3/10 pain and maximal function.  [x] Progressing  [] Met  [] Not Met 01/10/2025         PLAN     Monitor response to today's treatment session and progress with Physical Therapy plan of care as indicated.    Geovanna Watson, PT

## 2025-01-10 NOTE — PLAN OF CARE
[facility]   Physical Therapy Treatment Note + Progress Report     Name: Malgorzata Yoder  Clinic Number: [id number]    Therapy Diagnosis:   Encounter Diagnoses   Name Primary?    Decreased strength of lower extremity Yes    Decreased functional mobility and endurance      Physician: Eliz De La Fuente*    Visit Date: 1/10/2025    Physician Orders: Physical Therapy Evaluation and Treat  Medical Diagnosis from Referral: M54.16 (ICD-10-CM) - Lumbar radiculopathy, acute   Evaluation Date: 12/4/2024  Authorization Period Expiration: 12/31/2024  Plan of Care Expiration: 02/26/2025  Progress Note Due: 02/09/2025  Visit # / Visits authorized: 1/20 (+1 Evaluation + 3 treatment)    FOTO: 2/3      Precautions: Standard    Time In: 9:35 AM  Time Out: 10:30 AM  Total Billable Time: 55 minutes    SUBJECTIVE     Patient reports: she had her EMG appointment which showed some disturbance near her L5 and S1, however, patient states her doctor told her she does not have any nerve damage. Patient rates her pain slightly less than at last visit.  She was compliant with home exercise program.  Response to previous treatment: no adverse effects  Functional change: none noted yet    Pain: 7/10  Location: left  low back/hip left lateral LOWER EXTREMITY/ left lateral toes     OBJECTIVE     Objective Measures updated at progress report unless specified.     *denotes change since initial evaluation, re-tested on 01/10/2025    Sensation:  [x] Intact to Light Touch                         [] Impaired:     Palpation: Increased tone and tenderness noted with palpation of left piriformis, left glutes, left hamstrings> right      Posture: Patient presents with postural abnormalities which include:               [x] Forward Head                                [x] Increased Lumbar Lordosis              [x] Rounded Shoulder                         [] Genu Recurvatum              [] Increased Thoracic Kyphosis         [] Genu Valgus              [] Trunk Deviated                              [] Pes Planus              [] Scapular Winging                          [] Other:      Gait Analysis: The patient ambulated with the following assistive device: none; the pt presents with the following gait abnormalities: trendelenburg, decreased hip extension on left, and decreased knee flexion on left     STRENGTH:     L/E MMT Right    Left Pain/Dysfunction with Movement Goal   Hip Flexion  5/5 4+/5*   4+/5 Bilateral   Hip Extension  4+/5 4/5* Pain in  left posterior/lateral thigh  4+/5 Bilateral   Hip Abduction  5/5 4-/5* Pain in  left posterior/lateral thigh  4+/5 Bilateral   Knee Extension 5/5 5/5   5/5 Bilateral   Knee Flexion 5/5 4+/5   5/5 Bilateral   Ankle Dorsiflexion 5/5 5/5   5/5 Bilateral         FUNCTION:      CMS Impairment/Limitation/Restriction for FOTO Lumbar Survey     Therapist reviewed FOTO scores for [place] on 01/10/2025.   FOTO documents entered into [employer] - see Media section.     Intake Score: 16*           TREATMENT       CPT Intervention Performed  1/10/2025  Duration / Intensity     TE  Recumbent Bike x 6 minutes level 1     Lower Trunk Rotations x 3 minutes     Self Piriformis Lacrosse Ball Stretch  2 minutes seated    Objectives Re-assessed x 5 minutes         NMR  Posterior Pelvic Tilts x  3 minutes 3 second holds      Supine Ball Squeeze x  3 minutes 3 second holds      Supine Clamshell x  3 minutes 3 second holds black band    Sidelying Hip Abduction        Bridge x 2 x 10      Sidelying Clamshell x 2 x 10 bilateral      Shuttle Squat   Shuttle Squat Piriformis   3 minutes 5 bands  2 minutes each bilateral 3 bands          Sciatic Nerve Glides  3 x 10 bilateral    Straight Leg Raises x 2 x 10 bilateral    Sidelying Hip Abduction   2 x 10 bilateral         TA                                                               MT  Cupping with lumbar flexion  x  8 minutes   PLAN   straight leg raise , leg press           CPT Codes available for Billing:   (08) minutes of Manual therapy (MT) to improve pain and ROM.  (17) minutes of Therapeutic Exercise (TE) to develop strength, endurance, range of motion, and flexibility.  (30) minutes of Neuromuscular Re-Education (NMR)  to improve: Balance, Coordination, Kinesthetic, Sense, Proprioception, and Posture.  (00) minutes of Therapeutic Activities (TA) to improve functional performance.  Unattended Electrical Stimulation (ES) for muscle performance or pain modulation.  BFR: Blood flow restriction applied during exercise  NP or (-): Not Performed    PATIENT EDUCATION AND HOME EXERCISES     Home Exercises Provided and Patient Education Provided     Education provided:   PURPOSE: Patient educated on the impairments noted above and the effects of physical therapy intervention to improve overall condition and QOL.   EXERCISE: Patient was educated on all the above exercise prior/during/after for proper posture, positioning, and execution for safe performance with home exercise program.   STRENGTH: Patient educated on the importance of improved core and extremity strength in order to improve alignment of the spine and extremities with static positions and dynamic movement.     Written Home Exercises Provided: Patient instructed to cont prior HEP. Exercises were reviewed and Malgorzata was able to demonstrate them prior to the end of the session.  Malgorzata demonstrated good  understanding of the education provided. See EMR under Patient Instructions for exercises provided during therapy sessions      ASSESSMENT   Since initial evaluation patient demonstrates moderate improvements in left hip flexion, left hip abduction, and left hip extension strength. Despite these improvements, patient with more reported functional limitations per FOTO scores. This may be due to patient having better understanding of limitations since beginning PHYSICAL THERAPY. Because patient has made some  improvements, patient may continue to benefit from skilled therapy to continue addressing remaining deficits and pain levels.    Malgorzata Is progressing well towards her goals.   Patient prognosis is Good.     Patient will continue to benefit from skilled outpatient physical therapy to address the deficits listed in the problem list box on initial evaluation, provide patient/family education and to maximize patient's level of independence in the home and community environment.     Patient's spiritual, cultural and educational needs considered and patient agreeable to plan of care and goals.     Anticipated barriers to physical therapy: co-morbidities, sedentary lifestyle, chronicity of condition, and occupation     Goals:   Reviewed: 1/10/2025      Short Term Goals:  6 weeks Status  Date Met   PAIN: Patient will report worst pain of 5/10 in order to progress toward max functional ability and improve quality of life. [x] Progressing  [] Met  [] Not Met  01/10/2025   FUNCTION: Patient will demonstrate improved function as indicated by a functional intake score of more than or equal to 38 out of 100 on FOTO. [x] Progressing  [] Met  [] Not Met 01/10/2025   MOBILITY: Patient will improve Range of Motion to 50% of stated goals, listed in objective measures above, in order to progress towards independence with functional activities.  [x] Progressing  [] Met  [] Not Met 01/10/2025    STRENGTH: Patient will improve strength to 50% of stated goals, listed in objective measures above, in order to progress towards independence with functional activities.  [] Progressing  [x] Met  [] Not Met  01/10/2025      Long Term Goals:  12 weeks Status Date Met   PAIN: Patient will report worst pain of 3/10 in order to progress toward max functional ability and improve quality of life [x] Progressing  [] Met  [] Not Met  01/10/2025   FUNCTION: Patient will demonstrate improved function as indicated by a functional intake score of more  than or equal to 53 out of 100 on FOTO. [x] Progressing  [] Met  [] Not Met 01/10/2025    MOBILITY: Patient will improve Range of Motion to stated goals, listed in objective measures above, in order to return to maximal functional potential and improve quality of life. [x] Progressing  [] Met  [] Not Met  01/10/2025   STRENGTH: Patient will improve strength to stated goals, listed in objective measures above, in order to improve functional independence and quality of life. [x] Progressing  [] Met  [] Not Met 01/10/2025   GAIT: Patient will demonstrate normalized gait mechanics with minimal compensation in order to return to prior level of function. [x] Progressing  [] Met  [] Not Met 01/10/2025    Patient will return to normal ADL's, IADL's, community involvement, recreational activities, and work-related activities with less than or equal to 3/10 pain and maximal function.  [x] Progressing  [] Met  [] Not Met 01/10/2025         PLAN     Monitor response to today's treatment session and progress with Physical Therapy plan of care as indicated.    Geovanna Watson, PT

## 2025-01-14 DIAGNOSIS — I10 ESSENTIAL HYPERTENSION: ICD-10-CM

## 2025-01-14 RX ORDER — LABETALOL 200 MG/1
TABLET, FILM COATED ORAL
Qty: 180 TABLET | Refills: 0 | Status: SHIPPED | OUTPATIENT
Start: 2025-01-14

## 2025-01-14 NOTE — TELEPHONE ENCOUNTER
Care Due:                  Date            Visit Type   Department     Provider  --------------------------------------------------------------------------------                                EP -                              PRIMARY      [facility]  Sofia Mainampati  Last Visit: 09-      CARE ([facility])   MEDICINE       Abdul                              MYCHART                              ANNUAL                              CHECKUP/PHY  [facility]  Sofia Mainampati  Next Visit: 01-      S            MEDICINE       Abdul                                                            Last  Test          Frequency    Reason                     Performed    Due Date  --------------------------------------------------------------------------------    CMP.........  12 months..  potassium................  04- 09-    [facility] Embedded Care Due Messages. Reference number: [id number].   1/14/2025 5:55:35 AM CST

## 2025-01-29 ENCOUNTER — CLINICAL SUPPORT (OUTPATIENT)
Dept: REHABILITATION | Facility: HOSPITAL | Age: 40
End: 2025-01-29
Payer: COMMERCIAL

## 2025-01-29 DIAGNOSIS — Z74.09 DECREASED FUNCTIONAL MOBILITY AND ENDURANCE: ICD-10-CM

## 2025-01-29 DIAGNOSIS — R29.898 DECREASED STRENGTH OF LOWER EXTREMITY: Primary | ICD-10-CM

## 2025-01-29 PROCEDURE — 97112 NEUROMUSCULAR REEDUCATION: CPT

## 2025-01-29 PROCEDURE — 97110 THERAPEUTIC EXERCISES: CPT

## 2025-01-29 PROCEDURE — 97530 THERAPEUTIC ACTIVITIES: CPT

## 2025-01-29 NOTE — PROGRESS NOTES
[facility]   Physical Therapy Treatment Note      Name: Malgorzata Yoder  Clinic Number: [id number]    Therapy Diagnosis:   Encounter Diagnoses   Name Primary?    Decreased strength of lower extremity Yes    Decreased functional mobility and endurance      Physician: Eliz De La Fuente*    Visit Date: 1/29/2025    Physician Orders: Physical Therapy Evaluation and Treat  Medical Diagnosis from Referral: M54.16 (ICD-10-CM) - Lumbar radiculopathy, acute   Evaluation Date: 12/4/2024  Authorization Period Expiration: 12/31/2024  Plan of Care Expiration: 02/26/2025  Progress Note Due: 02/09/2025  Visit # / Visits authorized: 2/20 (+1 Evaluation + 3 treatment)    FOTO: 2/3      Precautions: Standard    Time In: 11:00 AM  Time Out: 11:50 AM  Total Billable Time: 50 minutes    SUBJECTIVE     Patient reports: she is continuing to deal with pain on her lateral hip. Patient states she is agreeable to being progressed today.  She was compliant with home exercise program.  Response to previous treatment: no adverse effects  Functional change: none noted yet    Pain: 7/10  Location: left  low back/hip left lateral LOWER EXTREMITY/ left lateral toes     OBJECTIVE     Objective Measures updated at progress report unless specified.     TREATMENT       CPT Intervention Performed  1/29/2025  Duration / Intensity     TE  Recumbent Bike x 6 minutes level 1     Lower Trunk Rotations x 3 minutes     Self Piriformis Lacrosse Ball Stretch  2 minutes seated    Objectives Re-assessed  5 minutes         NMR  Posterior Pelvic Tilts x  3 minutes 3 second holds      Supine Ball Squeeze   3 minutes 3 second holds      Supine Clamshell   3 minutes 3 second holds black band    Sidelying Hip Abduction        Bridge  2 x 10      Sidelying Clamshell  2 x 10 bilateral      Shuttle Squat   Shuttle Squat Piriformis   3 minutes 5 bands  2 minutes each bilateral 3 bands          Sciatic Nerve Glides  3 x 10 bilateral     Straight Leg Raises  2 x 10 bilateral    Sidelying Hip Abduction   2 x 10 bilateral    Lumbar flexion seated x 3 minutes forward    Supine dead bug modifiied  x 3 x 5 bilateral   TA  Side stepping x 3 minutes pink band      Heel taps x 2 x 10 4 inch      Matrix Leg Press x 3 x 10 85lbs                                        MT  Cupping with lumbar flexion    8 minutes   PLAN   straight leg raise , leg press          CPT Codes available for Billing:   (00) minutes of Manual therapy (MT) to improve pain and ROM.  (09) minutes of Therapeutic Exercise (TE) to develop strength, endurance, range of motion, and flexibility.  (13) minutes of Neuromuscular Re-Education (NMR)  to improve: Balance, Coordination, Kinesthetic, Sense, Proprioception, and Posture.  (28) minutes of Therapeutic Activities (TA) to improve functional performance.  Unattended Electrical Stimulation (ES) for muscle performance or pain modulation.  BFR: Blood flow restriction applied during exercise  NP or (-): Not Performed    PATIENT EDUCATION AND HOME EXERCISES     Home Exercises Provided and Patient Education Provided     Education provided:   PURPOSE: Patient educated on the impairments noted above and the effects of physical therapy intervention to improve overall condition and QOL.   EXERCISE: Patient was educated on all the above exercise prior/during/after for proper posture, positioning, and execution for safe performance with home exercise program.   STRENGTH: Patient educated on the importance of improved core and extremity strength in order to improve alignment of the spine and extremities with static positions and dynamic movement.     Written Home Exercises Provided: Patient instructed to cont prior HEP. Exercises were reviewed and Malgorzata was able to demonstrate them prior to the end of the session.  Malgorzata demonstrated good  understanding of the education provided. See EMR under Patient Instructions for exercises provided during  therapy sessions      ASSESSMENT   Progressed patient with core and hip strengthening/stability in functional standing. Patient tolerated this well but did present with glute fatigue by end of session.    Malgorzata Is progressing well towards her goals.   Patient prognosis is Good.     Patient will continue to benefit from skilled outpatient physical therapy to address the deficits listed in the problem list box on initial evaluation, provide patient/family education and to maximize patient's level of independence in the home and community environment.     Patient's spiritual, cultural and educational needs considered and patient agreeable to plan of care and goals.     Anticipated barriers to physical therapy: co-morbidities, sedentary lifestyle, chronicity of condition, and occupation     Goals:   Reviewed: 1/29/2025      Short Term Goals:  6 weeks Status  Date Met   PAIN: Patient will report worst pain of 5/10 in order to progress toward max functional ability and improve quality of life. [x] Progressing  [] Met  [] Not Met  01/10/2025   FUNCTION: Patient will demonstrate improved function as indicated by a functional intake score of more than or equal to 38 out of 100 on FOTO. [x] Progressing  [] Met  [] Not Met 01/10/2025   MOBILITY: Patient will improve Range of Motion to 50% of stated goals, listed in objective measures above, in order to progress towards independence with functional activities.  [x] Progressing  [] Met  [] Not Met 01/10/2025    STRENGTH: Patient will improve strength to 50% of stated goals, listed in objective measures above, in order to progress towards independence with functional activities.  [] Progressing  [x] Met  [] Not Met  01/10/2025      Long Term Goals:  12 weeks Status Date Met   PAIN: Patient will report worst pain of 3/10 in order to progress toward max functional ability and improve quality of life [x] Progressing  [] Met  [] Not Met  01/10/2025   FUNCTION: Patient will  demonstrate improved function as indicated by a functional intake score of more than or equal to 53 out of 100 on FOTO. [x] Progressing  [] Met  [] Not Met 01/10/2025    MOBILITY: Patient will improve Range of Motion to stated goals, listed in objective measures above, in order to return to maximal functional potential and improve quality of life. [x] Progressing  [] Met  [] Not Met  01/10/2025   STRENGTH: Patient will improve strength to stated goals, listed in objective measures above, in order to improve functional independence and quality of life. [x] Progressing  [] Met  [] Not Met 01/10/2025   GAIT: Patient will demonstrate normalized gait mechanics with minimal compensation in order to return to prior level of function. [x] Progressing  [] Met  [] Not Met 01/10/2025    Patient will return to normal ADL's, IADL's, community involvement, recreational activities, and work-related activities with less than or equal to 3/10 pain and maximal function.  [x] Progressing  [] Met  [] Not Met 01/10/2025         PLAN     Monitor response to today's treatment session and progress with Physical Therapy plan of care as indicated.    Geovanna Watson, PT

## 2025-01-31 ENCOUNTER — CLINICAL SUPPORT (OUTPATIENT)
Dept: REHABILITATION | Facility: HOSPITAL | Age: 40
End: 2025-01-31
Payer: COMMERCIAL

## 2025-01-31 ENCOUNTER — OFFICE VISIT (OUTPATIENT)
Dept: INTERNAL MEDICINE | Facility: CLINIC | Age: 40
End: 2025-01-31
Payer: COMMERCIAL

## 2025-01-31 ENCOUNTER — LAB VISIT (OUTPATIENT)
Dept: LAB | Facility: HOSPITAL | Age: 40
End: 2025-01-31
Attending: FAMILY MEDICINE
Payer: COMMERCIAL

## 2025-01-31 VITALS
HEIGHT: 65 IN | BODY MASS INDEX: 38.6 KG/M2 | WEIGHT: 231.69 LBS | OXYGEN SATURATION: 98 % | HEART RATE: 76 BPM | TEMPERATURE: 97 F | DIASTOLIC BLOOD PRESSURE: 92 MMHG | SYSTOLIC BLOOD PRESSURE: 142 MMHG

## 2025-01-31 DIAGNOSIS — R29.898 DECREASED STRENGTH OF LOWER EXTREMITY: Primary | ICD-10-CM

## 2025-01-31 DIAGNOSIS — E87.6 HYPOKALEMIA: ICD-10-CM

## 2025-01-31 DIAGNOSIS — D50.0 IRON DEFICIENCY ANEMIA DUE TO CHRONIC BLOOD LOSS: ICD-10-CM

## 2025-01-31 DIAGNOSIS — I10 ESSENTIAL HYPERTENSION: ICD-10-CM

## 2025-01-31 DIAGNOSIS — Z00.00 ROUTINE GENERAL MEDICAL EXAMINATION AT A HEALTH CARE FACILITY: ICD-10-CM

## 2025-01-31 DIAGNOSIS — E87.6 HYPOKALEMIA: Primary | ICD-10-CM

## 2025-01-31 DIAGNOSIS — E66.01 SEVERE OBESITY WITH BODY MASS INDEX (BMI) OF 35.0 TO 39.9 WITH SERIOUS COMORBIDITY: ICD-10-CM

## 2025-01-31 DIAGNOSIS — Z00.00 ROUTINE GENERAL MEDICAL EXAMINATION AT A HEALTH CARE FACILITY: Primary | ICD-10-CM

## 2025-01-31 DIAGNOSIS — F32.0 CURRENT MILD EPISODE OF MAJOR DEPRESSIVE DISORDER WITHOUT PRIOR EPISODE: ICD-10-CM

## 2025-01-31 DIAGNOSIS — M54.16 LUMBAR RADICULOPATHY, ACUTE: ICD-10-CM

## 2025-01-31 DIAGNOSIS — Z74.09 DECREASED FUNCTIONAL MOBILITY AND ENDURANCE: ICD-10-CM

## 2025-01-31 LAB
ALBUMIN SERPL BCP-MCNC: 4 G/DL (ref 3.5–5.2)
ALP SERPL-CCNC: 60 U/L (ref 40–150)
ALT SERPL W/O P-5'-P-CCNC: 21 U/L (ref 10–44)
ANION GAP SERPL CALC-SCNC: 8 MMOL/L (ref 8–16)
AST SERPL-CCNC: 18 U/L (ref 10–40)
BASOPHILS # BLD AUTO: 0.05 K/UL (ref 0–0.2)
BASOPHILS NFR BLD: 0.9 % (ref 0–1.9)
BILIRUB SERPL-MCNC: 0.6 MG/DL (ref 0.1–1)
BUN SERPL-MCNC: 11 MG/DL (ref 6–20)
CALCIUM SERPL-MCNC: 9.6 MG/DL (ref 8.7–10.5)
CHLORIDE SERPL-SCNC: 104 MMOL/L (ref 95–110)
CHOLEST SERPL-MCNC: 150 MG/DL (ref 120–199)
CHOLEST/HDLC SERPL: 3.8 {RATIO} (ref 2–5)
CO2 SERPL-SCNC: 28 MMOL/L (ref 23–29)
CREAT SERPL-MCNC: 1.1 MG/DL (ref 0.5–1.4)
DIFFERENTIAL METHOD BLD: ABNORMAL
EOSINOPHIL # BLD AUTO: 0.2 K/UL (ref 0–0.5)
EOSINOPHIL NFR BLD: 3.5 % (ref 0–8)
ERYTHROCYTE [DISTWIDTH] IN BLOOD BY AUTOMATED COUNT: 15.9 % (ref 11.5–14.5)
EST. GFR  (NO RACE VARIABLE): >60 ML/MIN/1.73 M^2
ESTIMATED AVG GLUCOSE: 91 MG/DL (ref 68–131)
FERRITIN SERPL-MCNC: 31 NG/ML (ref 20–300)
GLUCOSE SERPL-MCNC: 91 MG/DL (ref 70–110)
HBA1C MFR BLD: 4.8 % (ref 4–5.6)
HCT VFR BLD AUTO: 38.5 % (ref 37–48.5)
HDLC SERPL-MCNC: 39 MG/DL (ref 40–75)
HDLC SERPL: 26 % (ref 20–50)
HGB BLD-MCNC: 12.3 G/DL (ref 12–16)
IMM GRANULOCYTES # BLD AUTO: 0.01 K/UL (ref 0–0.04)
IMM GRANULOCYTES NFR BLD AUTO: 0.2 % (ref 0–0.5)
IRON SERPL-MCNC: 109 UG/DL (ref 30–160)
LDLC SERPL CALC-MCNC: 95.2 MG/DL (ref 63–159)
LYMPHOCYTES # BLD AUTO: 2 K/UL (ref 1–4.8)
LYMPHOCYTES NFR BLD: 34.1 % (ref 18–48)
MCH RBC QN AUTO: 28.4 PG (ref 27–31)
MCHC RBC AUTO-ENTMCNC: 31.9 G/DL (ref 32–36)
MCV RBC AUTO: 89 FL (ref 82–98)
MONOCYTES # BLD AUTO: 0.4 K/UL (ref 0.3–1)
MONOCYTES NFR BLD: 6.4 % (ref 4–15)
NEUTROPHILS # BLD AUTO: 3.2 K/UL (ref 1.8–7.7)
NEUTROPHILS NFR BLD: 54.9 % (ref 38–73)
NONHDLC SERPL-MCNC: 111 MG/DL
NRBC BLD-RTO: 0 /100 WBC
PLATELET # BLD AUTO: 245 K/UL (ref 150–450)
PMV BLD AUTO: 12.7 FL (ref 9.2–12.9)
POTASSIUM SERPL-SCNC: 3 MMOL/L (ref 3.5–5.1)
PROT SERPL-MCNC: 8.5 G/DL (ref 6–8.4)
RBC # BLD AUTO: 4.33 M/UL (ref 4–5.4)
SATURATED IRON: 26 % (ref 20–50)
SODIUM SERPL-SCNC: 140 MMOL/L (ref 136–145)
TOTAL IRON BINDING CAPACITY: 413 UG/DL (ref 250–450)
TRANSFERRIN SERPL-MCNC: 279 MG/DL (ref 200–375)
TRIGL SERPL-MCNC: 79 MG/DL (ref 30–150)
TSH SERPL DL<=0.005 MIU/L-ACNC: 0.85 UIU/ML (ref 0.4–4)
WBC # BLD AUTO: 5.78 K/UL (ref 3.9–12.7)

## 2025-01-31 PROCEDURE — 82728 ASSAY OF FERRITIN: CPT | Performed by: FAMILY MEDICINE

## 2025-01-31 PROCEDURE — 99214 OFFICE O/P EST MOD 30 MIN: CPT | Mod: 25,S$GLB,, | Performed by: FAMILY MEDICINE

## 2025-01-31 PROCEDURE — 83036 HEMOGLOBIN GLYCOSYLATED A1C: CPT | Performed by: FAMILY MEDICINE

## 2025-01-31 PROCEDURE — 1159F MED LIST DOCD IN RCRD: CPT | Mod: CPTII,S$GLB,, | Performed by: FAMILY MEDICINE

## 2025-01-31 PROCEDURE — 3080F DIAST BP >= 90 MM HG: CPT | Mod: CPTII,S$GLB,, | Performed by: FAMILY MEDICINE

## 2025-01-31 PROCEDURE — 97530 THERAPEUTIC ACTIVITIES: CPT

## 2025-01-31 PROCEDURE — 82088 ASSAY OF ALDOSTERONE: CPT | Performed by: FAMILY MEDICINE

## 2025-01-31 PROCEDURE — 99999 PR PBB SHADOW E&M-EST. PATIENT-LVL IV: CPT | Mod: PBBFAC,,, | Performed by: FAMILY MEDICINE

## 2025-01-31 PROCEDURE — 84466 ASSAY OF TRANSFERRIN: CPT | Performed by: FAMILY MEDICINE

## 2025-01-31 PROCEDURE — 36415 COLL VENOUS BLD VENIPUNCTURE: CPT | Performed by: FAMILY MEDICINE

## 2025-01-31 PROCEDURE — 80061 LIPID PANEL: CPT | Performed by: FAMILY MEDICINE

## 2025-01-31 PROCEDURE — 3077F SYST BP >= 140 MM HG: CPT | Mod: CPTII,S$GLB,, | Performed by: FAMILY MEDICINE

## 2025-01-31 PROCEDURE — 99395 PREV VISIT EST AGE 18-39: CPT | Mod: S$GLB,,, | Performed by: FAMILY MEDICINE

## 2025-01-31 PROCEDURE — 3008F BODY MASS INDEX DOCD: CPT | Mod: CPTII,S$GLB,, | Performed by: FAMILY MEDICINE

## 2025-01-31 PROCEDURE — 97112 NEUROMUSCULAR REEDUCATION: CPT

## 2025-01-31 PROCEDURE — 84443 ASSAY THYROID STIM HORMONE: CPT | Performed by: FAMILY MEDICINE

## 2025-01-31 PROCEDURE — 80053 COMPREHEN METABOLIC PANEL: CPT | Performed by: FAMILY MEDICINE

## 2025-01-31 PROCEDURE — 97110 THERAPEUTIC EXERCISES: CPT

## 2025-01-31 PROCEDURE — 85025 COMPLETE CBC W/AUTO DIFF WBC: CPT | Performed by: FAMILY MEDICINE

## 2025-01-31 RX ORDER — SERTRALINE HYDROCHLORIDE 25 MG/1
25 TABLET, FILM COATED ORAL DAILY
Qty: 30 TABLET | Refills: 11 | Status: SHIPPED | OUTPATIENT
Start: 2025-01-31 | End: 2026-01-31

## 2025-01-31 RX ORDER — SPIRONOLACTONE 25 MG/1
25 TABLET ORAL DAILY
Qty: 30 TABLET | Refills: 11 | Status: SHIPPED | OUTPATIENT
Start: 2025-01-31 | End: 2026-01-31

## 2025-01-31 RX ORDER — POTASSIUM CHLORIDE 750 MG/1
10 TABLET, EXTENDED RELEASE ORAL 2 TIMES DAILY
Qty: 60 TABLET | Refills: 3 | Status: SHIPPED | OUTPATIENT
Start: 2025-01-31

## 2025-01-31 NOTE — PROGRESS NOTES
Subjective:       Patient ID: Malgorzata Yoder is a 39 y.o. female presents with   Patient Active Problem List   Diagnosis    Essential hypertension    Hypokalemia    Carpal tunnel syndrome on both sides    Severe obesity with body mass index (BMI) of 35.0 to 39.9 with serious comorbidity    Lumbar radiculopathy, acute    Decreased strength of lower extremity    Decreased functional mobility and endurance        Chief Complaint: Annual Exam and Leg Pain    History of Present Illness    Malgorzata presents today for annual physical exam She experienced an emotional breakdown a few weeks ago with thoughts of self-harm but denies any specific plan. She has history of depression previously treated with Sertraline.   She reports numbing and tingling sensation on the far left side of her leg with associated swelling. She is currently receiving physical therapy for these symptoms and has an upcoming pain management appointment. She takes Labetalol for blood pressure management, taken between 8:35-8:40 AM today. She has not been taking prescribed potassium supplements. She reports previous adverse reaction to Spironolactone resulting in bilateral lower extremity edema.  Patient denies of any muscle cramps and reports that she eats food ball every day with taking her blood pressure medication  Denies of any chest tightness or shortness of breath lately, tingling or numbness sensation to extremities      ROS:  General: -fever, -chills, -fatigue, -weight gain, -weight loss, -loss of appetite  Eyes: -vision changes, -blurry vision, -eye pain, -eye discharge  ENT: -ear pain, -hearing loss, -tinnitus, -nasal congestion, -sore throat  Cardiovascular: -chest pain, -palpitations, -lower extremity edema  Respiratory: -cough, -shortness of breath, -wheezing, -sputum production  Endocrine: -polyuria, -polydipsia, -heat intolerance, -cold intolerance  Gastrointestinal: -abdominal pain, -heartburn, -nausea, -vomiting, -diarrhea,  "-constipation, -blood in stool  Genitourinary: -dysuria, -urgency, -frequency, -hematuria, -nocturia, -incontinence  Heme & Lymphatic: -easy or excessive bleeding, -easy bruising, -swollen lymph nodes  Musculoskeletal: -muscle pain, -back pain, -joint pain, -joint swelling  Skin: -rash, -lesion, -itching, -skin texture changes, -skin color changes  Neurological: -headache, -dizziness, +numbness, -tingling, -seizure activity, -speech difficulty, -memory loss, -confusion  Psychiatric: -anxiety, +depression, -sleep difficulty                BP (!) 142/92 (BP Location: Left arm, Patient Position: Sitting)   Pulse 76   Temp 97.4 °F (36.3 °C) (Tympanic)   Ht 5' 5" (1.651 m)   Wt 105.1 kg (231 lb 11.3 oz)   SpO2 98%   Breastfeeding No   BMI 38.56 kg/m²   Objective:      Physical Exam  Constitutional:       Appearance: She is well-developed.   HENT:      Head: Normocephalic and atraumatic.   Cardiovascular:      Rate and Rhythm: Normal rate and regular rhythm.      Heart sounds: Normal heart sounds. No murmur heard.  Pulmonary:      Effort: Pulmonary effort is normal.      Breath sounds: Normal breath sounds. No wheezing.   Abdominal:      General: Bowel sounds are normal.      Palpations: Abdomen is soft.      Tenderness: There is no abdominal tenderness.   Musculoskeletal:         General: Tenderness present.      Comments: Positive for tenderness to the paraspinal lumbar muscles in the left side   Skin:     General: Skin is warm and dry.      Findings: No rash.   Neurological:      Mental Status: She is alert and oriented to person, place, and time.   Psychiatric:         Mood and Affect: Mood normal.           Assessment/Plan:   1. Routine general medical examination at a health care facility  -     Urinalysis, Reflex to Urine Culture Urine, Clean Catch; Future; Expected date: 01/31/2025  -     Hemoglobin A1C; Future; Expected date: 01/31/2025  -     TSH; Future; Expected date: 01/31/2025  -     Lipid Panel; " Future; Expected date: 01/31/2025  -     Comprehensive Metabolic Panel; Future; Expected date: 01/31/2025  -     CBC Auto Differential; Future; Expected date: 01/31/2025  Vital signs stable today.  Clinical exam stable  Continue lifestyle modifications with low-fat and low-cholesterol diet and exercise 30 minutes daily      2. Essential hypertension  Overview:   Diagnosed in 2012 following 1st delivery.  Lisinopril 20 mg HCTZ 25 mg, 1 tab q.d. Since 2015.   07 /02/2019 Changed to labetalol 100 mg twice a day, CMP PCR added to prenatal lab for baseline.  Advised daily baby aspirin at 12-14 weeks  Weekly ultrasounds at 32 weeks      Orders:  -     Urinalysis, Reflex to Urine Culture Urine, Clean Catch; Future; Expected date: 01/31/2025  -     TSH; Future; Expected date: 01/31/2025  -     Lipid Panel; Future; Expected date: 01/31/2025  -     Comprehensive Metabolic Panel; Future; Expected date: 01/31/2025  -     Aldosterone/Renin Activity Ratio; Future; Expected date: 01/31/2025  -     spironolactone (ALDACTONE) 25 MG tablet; Take 1 tablet (25 mg total) by mouth once daily.  Dispense: 30 tablet; Refill: 11    Blood pressure elevated in spite of taking labetalol 200 mg twice daily  Will add Aldactone 25 mg daily  Patient was previously on amlodipine, lisinopril hydrochlorothiazide and losartan but has been taken out and patient does not recall reason for it  Will check renin aldosterone ratio secondary to hypokalemia  Recheck in 2 weeks as a nurse visit for blood pressure  Restrict salt intake and encouraged to monitor blood pressure trends    3. Iron deficiency anemia due to chronic blood loss  -     CBC Auto Differential; Future; Expected date: 01/31/2025  -     Iron and TIBC; Future; Expected date: 01/31/2025  -     Ferritin; Future; Expected date: 01/31/2025  Will check further labs, encouraged to eat iron and protein rich diet    4. Hypokalemia  Currently not taking any potassium supplements    5. Lumbar  radiculopathy, acute  Recently had EMG showing lumbar radiculopathy  Taking meloxicam as needed and continue physical therapy    6. Current mild episode of major depressive disorder without prior episode  -     sertraline (ZOLOFT) 25 MG tablet; Take 1 tablet (25 mg total) by mouth once daily.  Dispense: 30 tablet; Refill: 11  -     Ambulatory referral/consult to Psychology; Future; Expected date: 02/07/2025  Will get started on Zoloft 25 mg daily and will refer to Psychology for counseling  If any worsening symptoms advised to go to ER    7. Severe obesity with body mass index (BMI) of 35.0 to 39.9 with serious comorbidity  Strict lifestyle changes recommended with diet and exercise to lose weight with BMI 38           This note was generated with the assistance of ambient listening technology. Verbal consent was obtained by the patient and accompanying visitor(s) for the recording of patient appointment to facilitate this note. I attest to having reviewed and edited the generated note for accuracy, though some syntax or spelling errors may persist. Please contact the author of this note for any clarification.

## 2025-01-31 NOTE — PROGRESS NOTES
[facility]   Physical Therapy Treatment Note      Name: Malgorzata Yoder  Clinic Number: [id number]    Therapy Diagnosis:   Encounter Diagnoses   Name Primary?    Decreased strength of lower extremity Yes    Decreased functional mobility and endurance        Physician: Eliz De La Fuente*    Visit Date: 1/31/2025    Physician Orders: Physical Therapy Evaluation and Treat  Medical Diagnosis from Referral: M54.16 (ICD-10-CM) - Lumbar radiculopathy, acute   Evaluation Date: 12/4/2024  Authorization Period Expiration: 12/31/2024  Plan of Care Expiration: 02/26/2025  Progress Note Due: 02/09/2025  Visit # / Visits authorized: 3/20 (+1 Evaluation + 3 treatment)    FOTO: 2/3      Precautions: Standard    Time In: 10:10 AM  Time Out: 11:45 AM  Total Billable Time: 35 minutes    SUBJECTIVE     Patient reports: she was sore in her core and her hips after last session. Otherwise patient reports no new complaints.  She was compliant with home exercise program.  Response to previous treatment: no adverse effects  Functional change: none noted yet    Pain: 7/10  Location: left  low back/hip left lateral LOWER EXTREMITY/ left lateral toes     OBJECTIVE     Objective Measures updated at progress report unless specified.     TREATMENT       CPT Intervention Performed  1/31/2025  Duration / Intensity     TE  Recumbent Bike x 6 minutes level 1     Lower Trunk Rotations x 3 minutes     Self Piriformis Lacrosse Ball Stretch  2 minutes seated    Objectives Re-assessed  5 minutes         NMR  Posterior Pelvic Tilts x  3 minutes 3 second holds      Supine Ball Squeeze   3 minutes 3 second holds      Supine Clamshell   3 minutes 3 second holds black band    Sidelying Hip Abduction        Bridge  2 x 10      Sidelying Clamshell  2 x 10 bilateral      Shuttle Squat   Shuttle Squat Piriformis   3 minutes 5 bands  2 minutes each bilateral 3 bands          Sciatic Nerve Glides  3 x 10 bilateral    Straight  Leg Raises  2 x 10 bilateral    Sidelying Hip Abduction   2 x 10 bilateral    Lumbar flexion seated x 3 minutes forward    Supine dead bug modifiied  x 3 x 5 bilateral   TA  Side stepping x 3 minutes pink band      Heel taps x 2 x 10 4 inch      Matrix Leg Press x 3 x 10 85lbs                                        MT  Cupping with lumbar flexion    8 minutes   PLAN   straight leg raise , leg press          CPT Codes available for Billing:   (00) minutes of Manual therapy (MT) to improve pain and ROM.  (09) minutes of Therapeutic Exercise (TE) to develop strength, endurance, range of motion, and flexibility.  (10) minutes of Neuromuscular Re-Education (NMR)  to improve: Balance, Coordination, Kinesthetic, Sense, Proprioception, and Posture.  (16) minutes of Therapeutic Activities (TA) to improve functional performance.  Unattended Electrical Stimulation (ES) for muscle performance or pain modulation.  BFR: Blood flow restriction applied during exercise  NP or (-): Not Performed    PATIENT EDUCATION AND HOME EXERCISES     Home Exercises Provided and Patient Education Provided     Education provided:   PURPOSE: Patient educated on the impairments noted above and the effects of physical therapy intervention to improve overall condition and QOL.   EXERCISE: Patient was educated on all the above exercise prior/during/after for proper posture, positioning, and execution for safe performance with home exercise program.   STRENGTH: Patient educated on the importance of improved core and extremity strength in order to improve alignment of the spine and extremities with static positions and dynamic movement.     Written Home Exercises Provided: Patient instructed to cont prior HEP. Exercises were reviewed and Malgorzata was able to demonstrate them prior to the end of the session.  Malgorzata demonstrated good  understanding of the education provided. See EMR under Patient Instructions for exercises provided during therapy  sessions      ASSESSMENT   No exercises progressed today due to patient reporting soreness as well as patient having bloodwork drawn prior to therapy session. Patient tolerated session well with rest breaks as needed. Plan to continue progressing as tolerable next visit.    Malgorzata Is progressing well towards her goals.   Patient prognosis is Good.     Patient will continue to benefit from skilled outpatient physical therapy to address the deficits listed in the problem list box on initial evaluation, provide patient/family education and to maximize patient's level of independence in the home and community environment.     Patient's spiritual, cultural and educational needs considered and patient agreeable to plan of care and goals.     Anticipated barriers to physical therapy: co-morbidities, sedentary lifestyle, chronicity of condition, and occupation     Goals:   Reviewed: 1/31/2025      Short Term Goals:  6 weeks Status  Date Met   PAIN: Patient will report worst pain of 5/10 in order to progress toward max functional ability and improve quality of life. [x] Progressing  [] Met  [] Not Met  01/10/2025   FUNCTION: Patient will demonstrate improved function as indicated by a functional intake score of more than or equal to 38 out of 100 on FOTO. [x] Progressing  [] Met  [] Not Met 01/10/2025   MOBILITY: Patient will improve Range of Motion to 50% of stated goals, listed in objective measures above, in order to progress towards independence with functional activities.  [x] Progressing  [] Met  [] Not Met 01/10/2025    STRENGTH: Patient will improve strength to 50% of stated goals, listed in objective measures above, in order to progress towards independence with functional activities.  [] Progressing  [x] Met  [] Not Met  01/10/2025      Long Term Goals:  12 weeks Status Date Met   PAIN: Patient will report worst pain of 3/10 in order to progress toward max functional ability and improve quality of life [x]  Progressing  [] Met  [] Not Met  01/10/2025   FUNCTION: Patient will demonstrate improved function as indicated by a functional intake score of more than or equal to 53 out of 100 on FOTO. [x] Progressing  [] Met  [] Not Met 01/10/2025    MOBILITY: Patient will improve Range of Motion to stated goals, listed in objective measures above, in order to return to maximal functional potential and improve quality of life. [x] Progressing  [] Met  [] Not Met  01/10/2025   STRENGTH: Patient will improve strength to stated goals, listed in objective measures above, in order to improve functional independence and quality of life. [x] Progressing  [] Met  [] Not Met 01/10/2025   GAIT: Patient will demonstrate normalized gait mechanics with minimal compensation in order to return to prior level of function. [x] Progressing  [] Met  [] Not Met 01/10/2025    Patient will return to normal ADL's, IADL's, community involvement, recreational activities, and work-related activities with less than or equal to 3/10 pain and maximal function.  [x] Progressing  [] Met  [] Not Met 01/10/2025         PLAN     Monitor response to today's treatment session and progress with Physical Therapy plan of care as indicated.    Geovanna Watson, PT

## 2025-02-01 DIAGNOSIS — M54.16 LUMBAR RADICULOPATHY, ACUTE: ICD-10-CM

## 2025-02-03 LAB
ALDOST SERPL-MCNC: 21 NG/DL
ALDOST/RENIN PLAS-RTO: 26.2 RATIO
RENIN PLAS-CCNC: 0.8 NG/ML/HR

## 2025-02-03 RX ORDER — MELOXICAM 15 MG/1
15 TABLET ORAL
Qty: 30 TABLET | Refills: 0 | Status: SHIPPED | OUTPATIENT
Start: 2025-02-03 | End: 2025-02-28

## 2025-02-07 ENCOUNTER — TELEPHONE (OUTPATIENT)
Dept: PAIN MEDICINE | Facility: CLINIC | Age: 40
End: 2025-02-07
Payer: COMMERCIAL

## 2025-02-07 NOTE — TELEPHONE ENCOUNTER
Patient called and confirmed time and location for appointment. Patient given instructions about how our Interventional Pain Department practices. Patient advised to bring any prior images to the appointment.     Spoke with patient she has pain in lowe back and sciatica nerve pain left side

## 2025-02-11 ENCOUNTER — OFFICE VISIT (OUTPATIENT)
Dept: PAIN MEDICINE | Facility: CLINIC | Age: 40
End: 2025-02-11
Payer: COMMERCIAL

## 2025-02-11 VITALS
RESPIRATION RATE: 17 BRPM | WEIGHT: 232.81 LBS | DIASTOLIC BLOOD PRESSURE: 90 MMHG | HEIGHT: 65 IN | SYSTOLIC BLOOD PRESSURE: 150 MMHG | BODY MASS INDEX: 38.79 KG/M2 | HEART RATE: 74 BPM

## 2025-02-11 DIAGNOSIS — M54.16 LUMBAR RADICULOPATHY, ACUTE: Primary | ICD-10-CM

## 2025-02-11 DIAGNOSIS — M54.16 LUMBAR RADICULOPATHY, CHRONIC: ICD-10-CM

## 2025-02-11 DIAGNOSIS — M54.9 DORSALGIA, UNSPECIFIED: ICD-10-CM

## 2025-02-11 DIAGNOSIS — M47.816 LUMBAR SPONDYLOSIS: ICD-10-CM

## 2025-02-11 DIAGNOSIS — M51.362 DEGENERATION OF INTERVERTEBRAL DISC OF LUMBAR REGION WITH DISCOGENIC BACK PAIN AND LOWER EXTREMITY PAIN: ICD-10-CM

## 2025-02-11 PROCEDURE — 3008F BODY MASS INDEX DOCD: CPT | Mod: CPTII,S$GLB,, | Performed by: ANESTHESIOLOGY

## 2025-02-11 PROCEDURE — 1159F MED LIST DOCD IN RCRD: CPT | Mod: CPTII,S$GLB,, | Performed by: ANESTHESIOLOGY

## 2025-02-11 PROCEDURE — 3044F HG A1C LEVEL LT 7.0%: CPT | Mod: CPTII,S$GLB,, | Performed by: ANESTHESIOLOGY

## 2025-02-11 PROCEDURE — 99203 OFFICE O/P NEW LOW 30 MIN: CPT | Mod: S$GLB,,, | Performed by: ANESTHESIOLOGY

## 2025-02-11 PROCEDURE — 3080F DIAST BP >= 90 MM HG: CPT | Mod: CPTII,S$GLB,, | Performed by: ANESTHESIOLOGY

## 2025-02-11 PROCEDURE — 99999 PR PBB SHADOW E&M-EST. PATIENT-LVL IV: CPT | Mod: PBBFAC,,, | Performed by: ANESTHESIOLOGY

## 2025-02-11 PROCEDURE — 3077F SYST BP >= 140 MM HG: CPT | Mod: CPTII,S$GLB,, | Performed by: ANESTHESIOLOGY

## 2025-02-11 RX ORDER — PREGABALIN 50 MG/1
50 CAPSULE ORAL 2 TIMES DAILY
Qty: 60 CAPSULE | Refills: 2 | Status: SHIPPED | OUTPATIENT
Start: 2025-02-11

## 2025-02-11 NOTE — PROGRESS NOTES
New Patient Interventional Pain Note (Initial Visit)    Referring Physician: Eliz De La Fuente*    PCP: Sofia Abdul MD    Chief Complaint:     Chief Complaint   Patient presents with    Low-back Pain     Patient has pain in lower back that radiates into left buttock and down left leg to foot with numbness.  Pain level 8/10        SUBJECTIVE:    Malgorzata Yoder is a 39 y.o. female who presents to the clinic for the evaluation of lower back pain.   Patient reports 12 year history of waxing and waning lower back pain that has recently increased 2 months ago.  Patient reports having episode of significantly increased lower back pain at work in 2013.  Patient denies any previous surgeries in her lumbar spine or bilateral lower extremities.    Lower back pain described as an aching burning pain that starts in the left side of her lower back.  This pain then radiates down her left lower extremity along the posterior and lateral aspect to the lateral aspect of the left foot.  Patient denies any significant right-sided pain.  Pain is worse with prolonged standing and lifting, better with lying supine and rest.  Pain is currently rated an 8/10.  Patient denies any fevers, chills, changes in gait, saddle anesthesia, or bowel and bladder incontinence.      Non-Pharmacologic Treatments:  Physical Therapy/Home Exercise: yes  Ice/Heat:yes  TENS: no  Acupuncture: no  Massage: yes  Chiropractic: no        Previous Pain Medications:  Tylenol, ibuprofen, meloxicam, Flexeril, topicals     report:  Reviewed and consistent with medication use as prescribed.    Pain Procedures:   none    Pain Disability Index Review:         2/11/2025     9:10 AM   Last 3 PDI Scores   Pain Disability Index (PDI) 52       Imaging:       Results for orders placed during the hospital encounter of 11/12/24    X-Ray Lumbar Spine AP And Lateral    Narrative  EXAMINATION:  XR LUMBAR SPINE AP AND LATERAL    CLINICAL HISTORY:  Low back pain,  unspecified    TECHNIQUE:  AP, lateral and spot images were performed of the lumbar spine.    COMPARISON:  11/10/2015    FINDINGS:  Vertebral body heights maintained.  No spondylolisthesis.  L5-S1 degenerative disc height loss.  Multilevel facet arthropathy.  No acute abnormality.    Impression  Degenerative changes      Electronically signed by: Julito Soto MD  Date:    2024  Time:    11:11      Past Medical History:   Diagnosis Date    Asthma     childhood    History of ovarian cyst     Hypertension     Hypokalemia 2014    Leg swelling 2023    Paresthesia and pain of both upper extremities 2019     Past Surgical History:   Procedure Laterality Date    CARPAL TUNNEL RELEASE Bilateral     CERVICAL CERCLAGE N/A 09/10/2019    Procedure: CERCLAGE, CERVIX;  Surgeon: Vandana Smith MD;  Location: [facility] OR;  Service: OB/GYN;  Laterality: N/A;     SECTION       SECTION N/A 2020    Procedure:  SECTION;  Surgeon: Vandana Smith MD;  Location: [facility] L&D;  Service: OB/GYN;  Laterality: N/A;    MOUTH SURGERY      OOPHORECTOMY Right 2010     Social History     Socioeconomic History    Marital status: Single    Number of children: 1   Occupational History    Occupation: [employer]     Employer: [employer]   Tobacco Use    Smoking status: Never    Smokeless tobacco: Never   Substance and Sexual Activity    Alcohol use: No    Drug use: No    Sexual activity: Yes     Partners: Male     Birth control/protection: None     Social Drivers of Health     Financial Resource Strain: High Risk (2024)    Overall Financial Resource Strain (CARDIA)     Difficulty of Paying Living Expenses: Very hard   Food Insecurity: Food Insecurity Present (2024)    Hunger Vital Sign     Worried About Running Out of Food in the Last Year: Often true     Ran Out of Food in the Last Year: Often true   Transportation Needs: No Transportation Needs (2023)    PRAPARE - Transportation      Lack of Transportation (Medical): No     Lack of Transportation (Non-Medical): No   Physical Activity: Insufficiently Active (11/11/2024)    Exercise Vital Sign     Days of Exercise per Week: 7 days     Minutes of Exercise per Session: 10 min   Stress: Stress Concern Present (11/11/2024)    [place] [employer] - Occupational Stress Questionnaire     Feeling of Stress : Very much   Housing Stability: Unknown (11/11/2024)    Housing Stability Vital Sign     Unable to Pay for Housing in the Last Year: No     Family History   Problem Relation Name Age of Onset    Aneurysm Mother Malgorzata Yoder     Hypertension Mother Malgorzata Yoder     Miscarriages / Stillbirths Mother Malgorzata Yoder     Cancer Maternal Aunt          brain tumor    Breast cancer Maternal Grandmother Clemencia Yoder-Polidore     Cancer Maternal Grandmother Clemencia Martínez     Learning disabilities Daughter Andrew Yoder     Colon cancer Neg Hx      Ovarian cancer Neg Hx      Uterine cancer Neg Hx      Cervical cancer Neg Hx         Review of patient's allergies indicates:  No Known Allergies    Current Outpatient Medications   Medication Sig    labetaloL (NORMODYNE) 200 MG tablet TAKE 1 TABLET BY MOUTH EVERY 12 HOURS    meloxicam (MOBIC) 15 MG tablet Take 1 tablet by mouth once daily    norethindrone (MICRONOR) 0.35 mg tablet Take 1 tablet (0.35 mg total) by mouth once daily.    potassium chloride (KLOR-CON) 10 MEQ TbSR Take 1 tablet (10 mEq total) by mouth 2 (two) times daily.    sertraline (ZOLOFT) 25 MG tablet Take 1 tablet (25 mg total) by mouth once daily.    spironolactone (ALDACTONE) 25 MG tablet Take 1 tablet (25 mg total) by mouth once daily.    pregabalin (LYRICA) 50 MG capsule Take 1 capsule (50 mg total) by mouth 2 (two) times daily.     No current facility-administered medications for this visit.         ROS  Review of Systems   Constitutional:  Negative for chills, diaphoresis, fatigue and fever.  "  Respiratory:  Negative for chest tightness, shortness of breath, wheezing and stridor.    Cardiovascular:  Negative for chest pain and leg swelling.   Gastrointestinal:  Negative for blood in stool, diarrhea, nausea and vomiting.   Endocrine: Negative for cold intolerance and heat intolerance.   Genitourinary:  Negative for dysuria, hematuria and urgency.   Musculoskeletal:  Positive for arthralgias, back pain and myalgias. Negative for gait problem, joint swelling, neck pain and neck stiffness.   Skin:  Negative for rash.   Neurological:  Positive for weakness and numbness. Negative for tremors, seizures, speech difficulty, light-headedness and headaches.   Hematological:  Does not bruise/bleed easily.   Psychiatric/Behavioral:  Negative for agitation, confusion and suicidal ideas.             OBJECTIVE:  BP (!) 150/90   Pulse 74   Resp 17   Ht 5' 5" (1.651 m)   Wt 105.6 kg (232 lb 12.9 oz)   LMP 02/10/2025   BMI 38.74 kg/m²         Physical Exam  Constitutional:       Appearance: Normal appearance.   HENT:      Head: Normocephalic and atraumatic.   Eyes:      Extraocular Movements: Extraocular movements intact.      Pupils: Pupils are equal, round, and reactive to light.   Cardiovascular:      Pulses: Normal pulses.   Pulmonary:      Effort: Pulmonary effort is normal.   Skin:     General: Skin is warm.      Capillary Refill: Capillary refill takes less than 2 seconds.   Neurological:      Mental Status: She is alert and oriented to person, place, and time.      Sensory: No sensory deficit.      Motor: Weakness present. No abnormal muscle tone.      Gait: Gait normal.      Deep Tendon Reflexes: Babinski sign absent on the right side. Babinski sign absent on the left side.      Reflex Scores:       Patellar reflexes are 2+ on the right side and 2+ on the left side.       Achilles reflexes are 2+ on the right side and 1+ on the left side.     Comments: 4/5 strength in left dorsiflexion and left plantar " flexion   Psychiatric:         Mood and Affect: Mood normal.         Behavior: Behavior normal.         Thought Content: Thought content normal.           Musculoskeletal:        Lumbar Exam  Incision: no  Pain with Flexion: yes  Pain with Extension: yes  ROM:  Decreased  Paraspinous TTP:  Positive on left  Facet TTP:  L5-S1  Facet Loading:  Positive on the left  SLR:  Positive on the left  SIJ TTP:  Negative bilaterally  JEREMI:  Negative bilaterally      LABS:  Lab Results   Component Value Date    WBC 5.78 01/31/2025    HGB 12.3 01/31/2025    HCT 38.5 01/31/2025    MCV 89 01/31/2025     01/31/2025       CMP  Sodium   Date Value Ref Range Status   01/31/2025 140 136 - 145 mmol/L Final     Potassium   Date Value Ref Range Status   01/31/2025 3.0 (L) 3.5 - 5.1 mmol/L Final     Chloride   Date Value Ref Range Status   01/31/2025 104 95 - 110 mmol/L Final     CO2   Date Value Ref Range Status   01/31/2025 28 23 - 29 mmol/L Final     Glucose   Date Value Ref Range Status   01/31/2025 91 70 - 110 mg/dL Final     BUN   Date Value Ref Range Status   01/31/2025 11 6 - 20 mg/dL Final     Creatinine   Date Value Ref Range Status   01/31/2025 1.1 0.5 - 1.4 mg/dL Final     Calcium   Date Value Ref Range Status   01/31/2025 9.6 8.7 - 10.5 mg/dL Final     Total Protein   Date Value Ref Range Status   01/31/2025 8.5 (H) 6.0 - 8.4 g/dL Final     Albumin   Date Value Ref Range Status   01/31/2025 4.0 3.5 - 5.2 g/dL Final     Total Bilirubin   Date Value Ref Range Status   01/31/2025 0.6 0.1 - 1.0 mg/dL Final     Comment:     For infants and newborns, interpretation of results should be based  on gestational age, weight and in agreement with clinical  observations.    Premature Infant recommended reference ranges:  Up to 24 hours.............<8.0 mg/dL  Up to 48 hours............<12.0 mg/dL  3-5 days..................<15.0 mg/dL  6-29 days.................<15.0 mg/dL       Alkaline Phosphatase   Date Value Ref Range Status    01/31/2025 60 40 - 150 U/L Final     AST   Date Value Ref Range Status   01/31/2025 18 10 - 40 U/L Final     ALT   Date Value Ref Range Status   01/31/2025 21 10 - 44 U/L Final     Anion Gap   Date Value Ref Range Status   01/31/2025 8 8 - 16 mmol/L Final     eGFR if    Date Value Ref Range Status   02/23/2022 >60.0 >60 mL/min/1.73 m^2 Final     eGFR if non    Date Value Ref Range Status   02/23/2022 >60.0 >60 mL/min/1.73 m^2 Final     Comment:     Calculation used to obtain the estimated glomerular filtration  rate (eGFR) is the CKD-EPI equation.          Lab Results   Component Value Date    HGBA1C 4.8 01/31/2025             ASSESSMENT:       39 y.o. year old female with lower back pain, consistent with     1. Lumbar radiculopathy, acute  Ambulatory referral/consult to Pain Clinic    MRI Lumbar Spine Without Contrast    pregabalin (LYRICA) 50 MG capsule      2. Degeneration of intervertebral disc of lumbar region with discogenic back pain and lower extremity pain  pregabalin (LYRICA) 50 MG capsule      3. Lumbar spondylosis  pregabalin (LYRICA) 50 MG capsule      4. Dorsalgia, unspecified  pregabalin (LYRICA) 50 MG capsule      5. Lumbar radiculopathy, chronic  pregabalin (LYRICA) 50 MG capsule        Lumbar radiculopathy, acute  -     Ambulatory referral/consult to Pain Clinic  -     MRI Lumbar Spine Without Contrast; Future; Expected date: 02/11/2025  -     pregabalin (LYRICA) 50 MG capsule; Take 1 capsule (50 mg total) by mouth 2 (two) times daily.  Dispense: 60 capsule; Refill: 2    Degeneration of intervertebral disc of lumbar region with discogenic back pain and lower extremity pain  -     pregabalin (LYRICA) 50 MG capsule; Take 1 capsule (50 mg total) by mouth 2 (two) times daily.  Dispense: 60 capsule; Refill: 2    Lumbar spondylosis  -     pregabalin (LYRICA) 50 MG capsule; Take 1 capsule (50 mg total) by mouth 2 (two) times daily.  Dispense: 60 capsule; Refill:  2    Dorsalgia, unspecified  -     pregabalin (LYRICA) 50 MG capsule; Take 1 capsule (50 mg total) by mouth 2 (two) times daily.  Dispense: 60 capsule; Refill: 2    Lumbar radiculopathy, chronic  -     pregabalin (LYRICA) 50 MG capsule; Take 1 capsule (50 mg total) by mouth 2 (two) times daily.  Dispense: 60 capsule; Refill: 2             PLAN:   - Interventions:   - none at this time.  Pain appears to be consistent with left lumbar radiculopathy.  We will obtain updated MRI lumbar spine to further evaluate etiology of lower back pain.    - Anticoagulation use:   No no anticoagulation    - Medications:  - start pregabalin 50 mg twice a day  - continue meloxicam 15 mg daily p.r.n.    - Therapy:   - patient has completed 6 weeks of physician lead home formal physical therapy with the last 3 months with mild relief  - continue formal physical therapy for left lumbar radiculopathy    - Imaging/Diagnostic:  - x-rays of lumbar spine reviewed and findings discussed with patient.  Significant for loss of disc height at L5-S1  - we will obtain updated MRI lumbar spine without contrast to further evaluate lower back pain with left lumbar radiculopathy that has continued despite over 8 weeks of conservative therapy  - EMG and nerve conduction study of bilateral lower extremities on 12/18/2024 reveals evidence of left acute on chronic S1 radiculopathy and mild chronic radiculopathy of the left l L5 nerve root    - Consults:   - none at this time      - Patient Questions: Answered all of the patient's questions regarding diagnosis, therapy, and treatment     This condition does not require this patient to take time off of work, and the primary goal of our Pain Management services is to improve the patient's functional capacity.     - Follow up visit: return to clinic after MRI        The above plan and management options were discussed at length with patient. Patient is in agreement with the above and verbalized  understanding.    I discussed the goals of interventional chronic pain management with the patient on today's visit.  I explained the utility of injections for diagnostic and therapeutic purposes.  We discussed a multimodal approach to pain including treating the patient's given worst pain at any given time.  We will use a systematic approach to addressing pain.  We will also adopt a multimodal approach that includes injections, adjuvant medications, physical therapy, at times psychiatry.  There may be a limited role for opioid use intermittently in the treatment of pain, more particularly for acute pain although no one approach can be used as a sole treatment modality.    I emphasized the importance of regular exercise, core strengthening and stretching, diet and weight loss as a cornerstone of long-term pain management.      Mac Robert MD  Interventional Pain Management  [facility]    Future Appointments   Date Time Provider Department Center   2/12/2025 11:00 AM Geovanna Watson, PT [facility] [place]   2/14/2025  9:15 AM NURSE, [facility] INTERNAL MEDICINE [facility] [place]   2/18/2025  9:00 AM Geovanna Watson, PT [facility] [place]   2/22/2025  9:00 AM [facility] MRI [place]   3/13/2025 10:00 AM Colleen Daniels NP [facility] INT [facility] [place]           Disclaimer:  This note was prepared using voice recognition system and is likely to have sound alike errors that may have been overlooked even after proof reading.  Please call me with any questions    I spent a total of 30 minutes on the day of the visit.  This includes face to face time and non-face to face time preparing to see the patient (eg, review of tests), obtaining and/or reviewing separately obtained history, documenting clinical information in the electronic or other health record, independently interpreting results and communicating results to the patient/family/caregiver, or care coordinator.

## 2025-02-12 ENCOUNTER — CLINICAL SUPPORT (OUTPATIENT)
Dept: REHABILITATION | Facility: HOSPITAL | Age: 40
End: 2025-02-12
Payer: COMMERCIAL

## 2025-02-12 DIAGNOSIS — R29.898 DECREASED STRENGTH OF LOWER EXTREMITY: Primary | ICD-10-CM

## 2025-02-12 DIAGNOSIS — Z74.09 DECREASED FUNCTIONAL MOBILITY AND ENDURANCE: ICD-10-CM

## 2025-02-12 PROCEDURE — 97530 THERAPEUTIC ACTIVITIES: CPT

## 2025-02-12 PROCEDURE — 97110 THERAPEUTIC EXERCISES: CPT

## 2025-02-12 PROCEDURE — 97112 NEUROMUSCULAR REEDUCATION: CPT

## 2025-02-12 NOTE — PROGRESS NOTES
[facility]   Physical Therapy Treatment Note      Name: Malgorzata Yoder  [facility] Number: [id number]    Therapy Diagnosis:   Encounter Diagnoses   Name Primary?    Decreased strength of lower extremity Yes    Decreased functional mobility and endurance        Physician: Eliz De La Fuente*    Visit Date: 2/12/2025    Physician Orders: Physical Therapy Evaluation and Treat  Medical Diagnosis from Referral: M54.16 (ICD-10-CM) - Lumbar radiculopathy, acute   Evaluation Date: 12/4/2024  Authorization Period Expiration: 12/31/2024  Plan of Care Expiration: 02/26/2025  Progress Note Due: 02/09/2025  Visit # / Visits authorized: 5/20 (+1 Evaluation + 3 treatment)    FOTO: 2/3      Precautions: Standard    Time In: 11:00 AM  Time Out: 11:45 AM  Total Billable Time: 45 minutes    SUBJECTIVE     Patient reports: she went to the pain management doctor and at this point in time patient states she has an MRI scheduled in the future to determine further steps for her pain. Patient did report feeling a little better at the end of today's session.  She was compliant with home exercise program.  Response to previous treatment: no adverse effects  Functional change: none noted yet    Pain: 7/10  Location: left  low back/hip left lateral LOWER EXTREMITY/ left lateral toes     OBJECTIVE     Objective Measures updated at progress report unless specified.     TREATMENT       CPT Intervention Performed  2/12/2025  Duration / Intensity     TE  Recumbent Bike x 6 minutes level 1     Lower Trunk Rotations x 3 minutes     Self Piriformis Lacrosse Ball Stretch  2 minutes seated    Objectives Re-assessed  5 minutes         NMR  Posterior Pelvic Tilts x  3 minutes 3 second holds      Supine Ball Squeeze x  3 minutes 3 second holds      Supine Clamshell x  3 minutes 3 second holds black band    Sidelying Hip Abduction        Bridge  2 x 10      Sidelying Clamshell  2 x 10 bilateral      Shuttle Squat    Shuttle Squat Piriformis   3 minutes 5 bands  2 minutes each bilateral 3 bands          Sciatic Nerve Glides  3 x 10 bilateral    Straight Leg Raises  2 x 10 bilateral    Sidelying Hip Abduction   2 x 10 bilateral     Lumbar flexion seated x 3 minutes forward    Supine dead bug modifiied  x 3 x 5 bilateral   TA  Side stepping x 3 minutes pink band      Heel taps x 2 x 10 4 inch      Matrix Leg Press x 3 x 10 85lbs                                        MT  Cupping with lumbar flexion    8 minutes   PLAN   straight leg raise , leg press          CPT Codes available for Billing:   (00) minutes of Manual therapy (MT) to improve pain and ROM.  (09) minutes of Therapeutic Exercise (TE) to develop strength, endurance, range of motion, and flexibility.  (16) minutes of Neuromuscular Re-Education (NMR)  to improve: Balance, Coordination, Kinesthetic, Sense, Proprioception, and Posture.  (20) minutes of Therapeutic Activities (TA) to improve functional performance.  Unattended Electrical Stimulation (ES) for muscle performance or pain modulation.  BFR: Blood flow restriction applied during exercise  NP or (-): Not Performed    PATIENT EDUCATION AND HOME EXERCISES     Home Exercises Provided and Patient Education Provided     Education provided:   PURPOSE: Patient educated on the impairments noted above and the effects of physical therapy intervention to improve overall condition and QOL.   EXERCISE: Patient was educated on all the above exercise prior/during/after for proper posture, positioning, and execution for safe performance with home exercise program.   STRENGTH: Patient educated on the importance of improved core and extremity strength in order to improve alignment of the spine and extremities with static positions and dynamic movement.     Written Home Exercises Provided: Patient instructed to cont prior HEP. Exercises were reviewed and Malgorzata was able to demonstrate them prior to the end of the session.   Malgorzata demonstrated good  understanding of the education provided. See EMR under Patient Instructions for exercises provided during therapy sessions      ASSESSMENT   Patient tolerated today's session well and required minimal cueing for all exercises. Patient with less fatigue during dead bugs today.    Malgorzata Is progressing well towards her goals.   Patient prognosis is Good.     Patient will continue to benefit from skilled outpatient physical therapy to address the deficits listed in the problem list box on initial evaluation, provide patient/family education and to maximize patient's level of independence in the home and community environment.     Patient's spiritual, cultural and educational needs considered and patient agreeable to plan of care and goals.     Anticipated barriers to physical therapy: co-morbidities, sedentary lifestyle, chronicity of condition, and occupation     Goals:   Reviewed: 2/12/2025      Short Term Goals:  6 weeks Status  Date Met   PAIN: Patient will report worst pain of 5/10 in order to progress toward max functional ability and improve quality of life. [x] Progressing  [] Met  [] Not Met  01/10/2025   FUNCTION: Patient will demonstrate improved function as indicated by a functional intake score of more than or equal to 38 out of 100 on FOTO. [x] Progressing  [] Met  [] Not Met 01/10/2025   MOBILITY: Patient will improve Range of Motion to 50% of stated goals, listed in objective measures above, in order to progress towards independence with functional activities.  [x] Progressing  [] Met  [] Not Met 01/10/2025    STRENGTH: Patient will improve strength to 50% of stated goals, listed in objective measures above, in order to progress towards independence with functional activities.  [] Progressing  [x] Met  [] Not Met  01/10/2025      Long Term Goals:  12 weeks Status Date Met   PAIN: Patient will report worst pain of 3/10 in order to progress toward max functional ability  and improve quality of life [x] Progressing  [] Met  [] Not Met  01/10/2025   FUNCTION: Patient will demonstrate improved function as indicated by a functional intake score of more than or equal to 53 out of 100 on FOTO. [x] Progressing  [] Met  [] Not Met 01/10/2025    MOBILITY: Patient will improve Range of Motion to stated goals, listed in objective measures above, in order to return to maximal functional potential and improve quality of life. [x] Progressing  [] Met  [] Not Met  01/10/2025   STRENGTH: Patient will improve strength to stated goals, listed in objective measures above, in order to improve functional independence and quality of life. [x] Progressing  [] Met  [] Not Met 01/10/2025   GAIT: Patient will demonstrate normalized gait mechanics with minimal compensation in order to return to prior level of function. [x] Progressing  [] Met  [] Not Met 01/10/2025    Patient will return to normal ADL's, IADL's, community involvement, recreational activities, and work-related activities with less than or equal to 3/10 pain and maximal function.  [x] Progressing  [] Met  [] Not Met 01/10/2025         PLAN     Monitor response to today's treatment session and progress with Physical Therapy plan of care as indicated.    Geovanna Watson, PT

## 2025-02-14 ENCOUNTER — CLINICAL SUPPORT (OUTPATIENT)
Dept: INTERNAL MEDICINE | Facility: CLINIC | Age: 40
End: 2025-02-14
Payer: COMMERCIAL

## 2025-02-14 VITALS — DIASTOLIC BLOOD PRESSURE: 88 MMHG | SYSTOLIC BLOOD PRESSURE: 136 MMHG

## 2025-02-14 DIAGNOSIS — Z71.9 COUNSELED BY NURSE: Primary | ICD-10-CM

## 2025-02-14 PROCEDURE — 99999 PR PBB SHADOW E&M-EST. PATIENT-LVL II: CPT | Mod: PBBFAC,,,

## 2025-02-14 NOTE — PROGRESS NOTES
Pt present on today for blood pressure check. Pt reading stable on today and pt informed to continue current medication regime. Pt voiced understanding./Inocencia

## 2025-02-18 ENCOUNTER — CLINICAL SUPPORT (OUTPATIENT)
Dept: REHABILITATION | Facility: HOSPITAL | Age: 40
End: 2025-02-18
Payer: COMMERCIAL

## 2025-02-18 DIAGNOSIS — R29.898 DECREASED STRENGTH OF LOWER EXTREMITY: Primary | ICD-10-CM

## 2025-02-18 DIAGNOSIS — Z74.09 DECREASED FUNCTIONAL MOBILITY AND ENDURANCE: ICD-10-CM

## 2025-02-18 PROCEDURE — 97110 THERAPEUTIC EXERCISES: CPT

## 2025-02-18 PROCEDURE — 97530 THERAPEUTIC ACTIVITIES: CPT

## 2025-02-18 PROCEDURE — 97112 NEUROMUSCULAR REEDUCATION: CPT

## 2025-02-18 NOTE — PROGRESS NOTES
[facility]   Physical Therapy Treatment Note + Progress Report     Name: Malgorzata Yoder  Clinic Number: [id number]    Therapy Diagnosis:   Encounter Diagnoses   Name Primary?    Decreased strength of lower extremity Yes    Decreased functional mobility and endurance      Physician: Eliz De La Fuente*    Visit Date: 2/18/2025    Physician Orders: Physical Therapy Evaluation and Treat  Medical Diagnosis from Referral: M54.16 (ICD-10-CM) - Lumbar radiculopathy, acute   Evaluation Date: 12/4/2024  Authorization Period Expiration: 12/31/2024  Plan of Care Expiration: 02/26/2025  Progress Note Due: 02/26/2025  Visit # / Visits authorized: 6/20 (+1 Evaluation + 3 treatment)    FOTO: 3/3      Precautions: Standard    Time In: 9:00 AM  Time Out: 9:55 AM  Total Billable Time: 50 minutes    SUBJECTIVE     Patient reports: she went to the pain management doctor and at this point in time patient states she has an MRI scheduled in the future to determine further steps for her pain. Patient did report feeling a little better at the end of today's session.  She was compliant with home exercise program.  Response to previous treatment: no adverse effects  Functional change: none noted yet    Pain: 7/10  Location: left  low back/hip left lateral LOWER EXTREMITY/ left lateral toes     OBJECTIVE     Objective Measures updated at progress report unless specified.     *Denotes change since initial evaluation, re-tested on 02/18/2025    Sensation:  [x] Intact to Light Touch                         [] Impaired:     Palpation: Increased tone and tenderness noted with palpation of left piriformis, left glutes, left hamstrings> right      Posture: Patient presents with postural abnormalities which include:               [x] Forward Head                                [x] Increased Lumbar Lordosis              [x] Rounded Shoulder                         [] Genu Recurvatum              [] Increased  Thoracic Kyphosis        [] Genu Valgus              [] Trunk Deviated                              [] Pes Planus              [] Scapular Winging                          [] Other:      Gait Analysis: The patient ambulated with the following assistive device: none; the pt presents with the following gait abnormalities: trendelenburg, decreased hip extension on left, and decreased knee flexion on left     STRENGTH:     L/E MMT Right    Left Pain/Dysfunction with Movement Goal   Hip Flexion  5/5 5/5*   4+/5 Bilateral   Hip Extension  4+/5 4/5* Pain in  left posterior/lateral thigh  4+/5 Bilateral   Hip Abduction  5/5 4+/5* Pain in  left posterior/lateral thigh  4+/5 Bilateral   Knee Extension 5/5 5/5   5/5 Bilateral   Knee Flexion 5/5 5/5*   5/5 Bilateral   Ankle Dorsiflexion 5/5 5/5   5/5 Bilateral         FUNCTION:      CMS Impairment/Limitation/Restriction for FOTO Lumbar Survey     Therapist reviewed FOTO scores for [place] on 02/18/2025.   FOTO documents entered into [employer] - see Media section.     Intake Score: 42*           TREATMENT       CPT Intervention Performed  2/18/2025  Duration / Intensity     TE  Recumbent Bike x 6 minutes level 1     Lower Trunk Rotations x 3 minutes     Self Piriformis Lacrosse Ball Stretch  2 minutes seated    Objectives Re-assessed x 5 minutes         NMR  Posterior Pelvic Tilts x  3 minutes 3 second holds      Supine Ball Squeeze x  3 minutes 3 second holds      Supine Clamshell x  3 minutes 3 second holds black band    Sidelying Hip Abduction        Bridge  2 x 10      Sidelying Clamshell  2 x 10 bilateral      Shuttle Squat   Shuttle Squat Piriformis   3 minutes 5 bands  2 minutes each bilateral 3 bands          Sciatic Nerve Glides  3 x 10 bilateral    Straight Leg Raises x 3 x 10 bilateral    Sidelying Hip Abduction   2 x 10 bilateral     Lumbar flexion seated x 3 minutes forward    Supine dead bug modifiied  x 3 x 5 bilateral   TA  Side stepping x 3 minutes pink band       Heel taps  2 x 10 4 inch      Matrix Leg Press x 3 x 10 85lbs                                        MT  Cupping with lumbar flexion    8 minutes   PLAN   straight leg raise , leg press          CPT Codes available for Billing:   (00) minutes of Manual therapy (MT) to improve pain and ROM.  (12) minutes of Therapeutic Exercise (TE) to develop strength, endurance, range of motion, and flexibility.  (16) minutes of Neuromuscular Re-Education (NMR)  to improve: Balance, Coordination, Kinesthetic, Sense, Proprioception, and Posture.  (22) minutes of Therapeutic Activities (TA) to improve functional performance.  Unattended Electrical Stimulation (ES) for muscle performance or pain modulation.  BFR: Blood flow restriction applied during exercise  NP or (-): Not Performed    PATIENT EDUCATION AND HOME EXERCISES     Home Exercises Provided and Patient Education Provided     Education provided:   PURPOSE: Patient educated on the impairments noted above and the effects of physical therapy intervention to improve overall condition and QOL.   EXERCISE: Patient was educated on all the above exercise prior/during/after for proper posture, positioning, and execution for safe performance with home exercise program.   STRENGTH: Patient educated on the importance of improved core and extremity strength in order to improve alignment of the spine and extremities with static positions and dynamic movement.     Written Home Exercises Provided: Patient instructed to cont prior HEP. Exercises were reviewed and Malgorzata was able to demonstrate them prior to the end of the session.  Malgorzata demonstrated good  understanding of the education provided. See EMR under Patient Instructions for exercises provided during therapy sessions      ASSESSMENT   Since initial evaluation patient demonstrates moderate improvements in left hip flexion, left hip abduction, left hip extension strength, and left knee extension strength. Patient with  moderate improvements in functional limitations per FOTO scores re-taken today. Overall, patient tolerated session well. Added back in straight leg raises for continued core stability with LOWER EXTREMITY movement. Patient to have MRI this weekend and will determine further steps moving forward for pain control and PHYSICAL THERAPY.    Malgorzata Is progressing well towards her goals.   Patient prognosis is Good.     Patient will continue to benefit from skilled outpatient physical therapy to address the deficits listed in the problem list box on initial evaluation, provide patient/family education and to maximize patient's level of independence in the home and community environment.     Patient's spiritual, cultural and educational needs considered and patient agreeable to plan of care and goals.     Anticipated barriers to physical therapy: co-morbidities, sedentary lifestyle, chronicity of condition, and occupation     Goals:   Reviewed: 2/18/2025      Short Term Goals:  6 weeks Status  Date Met   PAIN: Patient will report worst pain of 5/10 in order to progress toward max functional ability and improve quality of life. [x] Progressing  [] Met  [] Not Met  02/18/2025   FUNCTION: Patient will demonstrate improved function as indicated by a functional intake score of more than or equal to 38 out of 100 on FOTO. [] Progressing  [x] Met  [] Not Met 02/18/2025   MOBILITY: Patient will improve Range of Motion to 50% of stated goals, listed in objective measures above, in order to progress towards independence with functional activities.  [x] Progressing  [] Met  [] Not Met  02/18/2025   STRENGTH: Patient will improve strength to 50% of stated goals, listed in objective measures above, in order to progress towards independence with functional activities.  [] Progressing  [x] Met  [] Not Met  01/10/2025      Long Term Goals:  12 weeks Status Date Met   PAIN: Patient will report worst pain of 3/10 in order to progress  toward max functional ability and improve quality of life [x] Progressing  [] Met  [] Not Met  02/18/2025   FUNCTION: Patient will demonstrate improved function as indicated by a functional intake score of more than or equal to 53 out of 100 on FOTO. [x] Progressing  [] Met  [] Not Met 02/18/2025    MOBILITY: Patient will improve Range of Motion to stated goals, listed in objective measures above, in order to return to maximal functional potential and improve quality of life. [x] Progressing  [] Met  [] Not Met 02/18/2025    STRENGTH: Patient will improve strength to stated goals, listed in objective measures above, in order to improve functional independence and quality of life. [x] Progressing  [] Met  [] Not Met 02/18/2025   GAIT: Patient will demonstrate normalized gait mechanics with minimal compensation in order to return to prior level of function. [x] Progressing  [] Met  [] Not Met 02/18/2025   Patient will return to normal ADL's, IADL's, community involvement, recreational activities, and work-related activities with less than or equal to 3/10 pain and maximal function.  [x] Progressing  [] Met  [] Not Met  02/18/2025        PLAN     Monitor response to today's treatment session and progress with Physical Therapy plan of care as indicated.    Geovanna Watson, PT

## 2025-02-22 ENCOUNTER — HOSPITAL ENCOUNTER (OUTPATIENT)
Dept: RADIOLOGY | Facility: HOSPITAL | Age: 40
Discharge: HOME | End: 2025-02-22
Attending: ANESTHESIOLOGY
Payer: COMMERCIAL

## 2025-02-22 DIAGNOSIS — M54.16 LUMBAR RADICULOPATHY, ACUTE: ICD-10-CM

## 2025-02-22 PROCEDURE — 72148 MRI LUMBAR SPINE W/O DYE: CPT | Mod: TC

## 2025-02-22 PROCEDURE — 72148 MRI LUMBAR SPINE W/O DYE: CPT | Mod: 26,,, | Performed by: RADIOLOGY

## 2025-02-28 DIAGNOSIS — M54.16 LUMBAR RADICULOPATHY, ACUTE: ICD-10-CM

## 2025-02-28 RX ORDER — MELOXICAM 15 MG/1
15 TABLET ORAL
Qty: 30 TABLET | Refills: 1 | Status: SHIPPED | OUTPATIENT
Start: 2025-02-28

## 2025-02-28 NOTE — TELEPHONE ENCOUNTER
No care due was identified.  [facility] Embedded Care Due Messages. Reference number: [id number].   2/28/2025 9:15:03 AM CST

## 2025-03-10 ENCOUNTER — OFFICE VISIT (OUTPATIENT)
Dept: PAIN MEDICINE | Facility: CLINIC | Age: 40
End: 2025-03-10
Payer: COMMERCIAL

## 2025-03-10 VITALS
HEART RATE: 75 BPM | WEIGHT: 234.56 LBS | DIASTOLIC BLOOD PRESSURE: 83 MMHG | BODY MASS INDEX: 39.08 KG/M2 | HEIGHT: 65 IN | RESPIRATION RATE: 17 BRPM | SYSTOLIC BLOOD PRESSURE: 136 MMHG

## 2025-03-10 DIAGNOSIS — M47.816 LUMBAR SPONDYLOSIS: ICD-10-CM

## 2025-03-10 DIAGNOSIS — M54.9 DORSALGIA, UNSPECIFIED: ICD-10-CM

## 2025-03-10 DIAGNOSIS — M48.061 DEGENERATIVE LUMBAR SPINAL STENOSIS: ICD-10-CM

## 2025-03-10 DIAGNOSIS — M54.16 LUMBAR RADICULOPATHY, ACUTE: ICD-10-CM

## 2025-03-10 DIAGNOSIS — M54.16 LUMBAR RADICULOPATHY, CHRONIC: ICD-10-CM

## 2025-03-10 DIAGNOSIS — M54.16 LEFT LUMBAR RADICULOPATHY: Primary | ICD-10-CM

## 2025-03-10 DIAGNOSIS — M51.362 DEGENERATION OF INTERVERTEBRAL DISC OF LUMBAR REGION WITH DISCOGENIC BACK PAIN AND LOWER EXTREMITY PAIN: ICD-10-CM

## 2025-03-10 PROCEDURE — 3079F DIAST BP 80-89 MM HG: CPT | Mod: CPTII,S$GLB,, | Performed by: PHYSICIAN ASSISTANT

## 2025-03-10 PROCEDURE — G2211 COMPLEX E/M VISIT ADD ON: HCPCS | Mod: S$GLB,,, | Performed by: PHYSICIAN ASSISTANT

## 2025-03-10 PROCEDURE — 3008F BODY MASS INDEX DOCD: CPT | Mod: CPTII,S$GLB,, | Performed by: PHYSICIAN ASSISTANT

## 2025-03-10 PROCEDURE — 1160F RVW MEDS BY RX/DR IN RCRD: CPT | Mod: CPTII,S$GLB,, | Performed by: PHYSICIAN ASSISTANT

## 2025-03-10 PROCEDURE — 99214 OFFICE O/P EST MOD 30 MIN: CPT | Mod: S$GLB,,, | Performed by: PHYSICIAN ASSISTANT

## 2025-03-10 PROCEDURE — 99999 PR PBB SHADOW E&M-EST. PATIENT-LVL IV: CPT | Mod: PBBFAC,,, | Performed by: PHYSICIAN ASSISTANT

## 2025-03-10 PROCEDURE — 1159F MED LIST DOCD IN RCRD: CPT | Mod: CPTII,S$GLB,, | Performed by: PHYSICIAN ASSISTANT

## 2025-03-10 PROCEDURE — 3075F SYST BP GE 130 - 139MM HG: CPT | Mod: CPTII,S$GLB,, | Performed by: PHYSICIAN ASSISTANT

## 2025-03-10 PROCEDURE — 3044F HG A1C LEVEL LT 7.0%: CPT | Mod: CPTII,S$GLB,, | Performed by: PHYSICIAN ASSISTANT

## 2025-03-10 RX ORDER — PREGABALIN 100 MG/1
100 CAPSULE ORAL 2 TIMES DAILY
Qty: 60 CAPSULE | Refills: 1 | Status: SHIPPED | OUTPATIENT
Start: 2025-03-10

## 2025-03-10 NOTE — PROGRESS NOTES
Chronic Pain -- Established Patient (Follow-up visit)    Referring Physician: Eliz De La Fuente*    PCP: Sofia Abdul MD      Chief Complaint:  Chief Complaint   Patient presents with    Low-back Pain     Patient has pain in lower back that radiates into left buttock and down left leg to foot with pins/needle and burning sensation down spine.  Pain level 8/10          SUBJECTIVE:    Interval History (3/10/2025):  Patient presents today for follow-up visit to review lumbar MRI.  Since the last visit, she has completed physical therapy.  She found minimal relief, although it did help with core strengthening and overall strengthening.  She continues to have pain in the low back radiating down the left leg.  She feels a pins and needles sensation.   Patient reports pain as 8/10 today.  She continues to take Lyrica 50 mg twice a day, which does not cause drowsiness, but overall does not seem to be providing adequate pain relief.    Initial HPI (2/11/2025):  Malgorzata Yoder is a 39 y.o. female who presents to the clinic for the evaluation of lower back pain.   Patient reports 12 year history of waxing and waning lower back pain that has recently increased 2 months ago.  Patient reports having episode of significantly increased lower back pain at work in 2013.  Patient denies any previous surgeries in her lumbar spine or bilateral lower extremities.    Lower back pain described as an aching burning pain that starts in the left side of her lower back.  This pain then radiates down her left lower extremity along the posterior and lateral aspect to the lateral aspect of the left foot.  Patient denies any significant right-sided pain.  Pain is worse with prolonged standing and lifting, better with lying supine and rest.  Pain is currently rated an 8/10.  Patient denies any fevers, chills, changes in gait, saddle anesthesia, or bowel and bladder incontinence.      Non-Pharmacologic Treatments:  Physical  Therapy/Home Exercise: yes  Ice/Heat:yes  TENS: no  Acupuncture: no  Massage: yes  Chiropractic: no        Previous Pain Medications:  Tylenol, ibuprofen, meloxicam, Flexeril, topicals      Pain Procedures:   None       Pain Disability Index (PDI) Score Review:      2/11/2025     9:10 AM   Last 3 PDI Scores   Pain Disability Index (PDI) 52         Imaging/ Diagnostic Studies/ Labs (Reviewed on 3/10/2025):    2/22/25 MRI Lumbar Spine Without Contrast  COMPARISON: X-ray November 12, 2024    FINDINGS:  The distal cord and conus reveal normal signal and morphology.    Lumbar vertebra are normal in alignment.  There are prominent type 2 endplate signal changes at L5-S1.    T12-L1: Unremarkable.    L1-2:     Unremarkable.    L2-3:     Minor hypertrophic facet arthrosis.    L3-4:     Mild disc degeneration with disc desiccation disc bulge and small broad-based central disc protrusion contributing to ventral sac impression.  Mild hypertrophic facet arthrosis with mild central stenosis.    L4-5:     Mild disc degeneration with disc desiccation and generalized annular bulge.  Mild to moderate hypertrophic facet arthrosis left greater than right.  Mild central and minor right foraminal stenosis.    L5-S1:    Moderate disc degeneration with disc bulge and osteophyte.  Focal left lateral recess disc protrusion best seen on axial image 36 of series 6 with left lateral recess stenosis and probable left S1 nerve root impingement.  Mild facet arthrosis with mild central stenosis.  Mild left foraminal stenosis.  Impression:   Focal left L5-S1 lateral recess disc protrusion.    Central L3-4 disc protrusion.    L3-4 through L5-S1 degenerative disc disease as above.        11/12/24 X-Ray Lumbar Spine AP And Lateral  COMPARISON: 11/10/2015    FINDINGS:  Vertebral body heights maintained.  No spondylolisthesis.  L5-S1 degenerative disc height loss.  Multilevel facet arthropathy.  No acute abnormality.    Impression  Degenerative  "changes        BLE EMG/NCS (12/18/2024)  left acute on chronic S1 radiculopathy and mild chronic radiculopathy of the left l L5 nerve root            Review of Systems:   Constitutional:  Negative for chills, diaphoresis, fatigue and fever.   Respiratory:  Negative for chest tightness, shortness of breath, wheezing and stridor.    Cardiovascular:  Negative for chest pain and leg swelling.   Gastrointestinal:  Negative for blood in stool, diarrhea, nausea and vomiting.   Endocrine: Negative for cold intolerance and heat intolerance.   Genitourinary:  Negative for dysuria, hematuria and urgency.   Musculoskeletal:  Positive for arthralgias, back pain and myalgias. Negative for gait problem, joint swelling, neck pain and neck stiffness.   Skin:  Negative for rash.   Neurological:  Positive for weakness and numbness. Negative for tremors, seizures, speech difficulty, light-headedness and headaches.   Hematological:  Does not bruise/bleed easily.   Psychiatric/Behavioral:  Negative for agitation, confusion and suicidal ideas.             OBJECTIVE:    Physical Exam  Vitals:    03/10/25 0916   BP: 136/83   Pulse: 75   Resp: 17   Weight: 106.4 kg (234 lb 9.1 oz)   Height: 5' 5" (1.651 m)   PainSc:   8    Body mass index is 39.03 kg/m².   (reviewed on 3/10/2025)    Constitutional:       Appearance: Normal appearance.   HENT:      Head: Normocephalic and atraumatic.   Eyes:      Extraocular Movements: Extraocular movements intact.      Pupils: Pupils are equal, round, and reactive to light.   Pulmonary:      Effort: Pulmonary effort is normal.   Skin:     General: Skin is warm.      Capillary Refill: Capillary refill takes less than 2 seconds.   Neurological:      Mental Status: She is alert and oriented to person, place, and time.      Sensory: No sensory deficit.      Motor: Weakness present. No abnormal muscle tone.      Gait: Gait normal.      Deep Tendon Reflexes: Babinski sign absent on the right side. Babinski sign " absent on the left side.      Reflex Scores:       Patellar reflexes are 2+ on the right side and 2+ on the left side.       Achilles reflexes are 2+ on the right side and 1+ on the left side.     Comments: 4/5 strength in left dorsiflexion and left plantar flexion   Psychiatric:         Mood and Affect: Mood normal.         Behavior: Behavior normal.         Thought Content: Thought content normal.       Musculoskeletal:  Lumbar Exam  Incision: no  Pain with Flexion: yes  Pain with Extension: yes  ROM:  Decreased  Paraspinous TTP:  Positive on left  Facet TTP:  L5-S1  Facet Loading:  Positive on the left  SLR:  Positive on the left  SIJ TTP:  Negative bilaterally  JEREMI:  Negative bilaterally                  ASSESSMENT:     40 y.o. year old female with lower back pain, consistent with     1. Left lumbar radiculopathy  Case Request-RAD/Other Procedure Area: Left L5/S1 + S1 TF PRISCILLA      2. Degeneration of intervertebral disc of lumbar region with discogenic back pain and lower extremity pain  pregabalin (LYRICA) 100 MG capsule      3. Degenerative lumbar spinal stenosis        4. Lumbar radiculopathy, acute  pregabalin (LYRICA) 100 MG capsule      5. Lumbar spondylosis  pregabalin (LYRICA) 100 MG capsule      6. Dorsalgia, unspecified  pregabalin (LYRICA) 100 MG capsule      7. Lumbar radiculopathy, chronic  pregabalin (LYRICA) 100 MG capsule          Left lumbar radiculopathy  -     Case Request-RAD/Other Procedure Area: Left L5/S1 + S1 TF PRISCILLA    Degeneration of intervertebral disc of lumbar region with discogenic back pain and lower extremity pain  -     pregabalin (LYRICA) 100 MG capsule; Take 1 capsule (100 mg total) by mouth 2 (two) times daily.  Dispense: 60 capsule; Refill: 1    Degenerative lumbar spinal stenosis    Lumbar radiculopathy, acute  -     pregabalin (LYRICA) 100 MG capsule; Take 1 capsule (100 mg total) by mouth 2 (two) times daily.  Dispense: 60 capsule; Refill: 1    Lumbar spondylosis  -      pregabalin (LYRICA) 100 MG capsule; Take 1 capsule (100 mg total) by mouth 2 (two) times daily.  Dispense: 60 capsule; Refill: 1    Dorsalgia, unspecified  -     pregabalin (LYRICA) 100 MG capsule; Take 1 capsule (100 mg total) by mouth 2 (two) times daily.  Dispense: 60 capsule; Refill: 1    Lumbar radiculopathy, chronic  -     pregabalin (LYRICA) 100 MG capsule; Take 1 capsule (100 mg total) by mouth 2 (two) times daily.  Dispense: 60 capsule; Refill: 1               PLAN:   - Interventions:   - Schedule left L5/S1 + S1 TF PRISCILLA. Patient is not taking prescription blood thinners or ASA.     - Anticoagulation use:   No no anticoagulation    - Medications:  - Increase pregabalin 50 mg to Lyrica (pregabalin) 100mg BID.  Tolerating well, although providing minimal benefit at this time.  - Continue meloxicam 15 mg QD PRN.     - LA  reviewed and appropriate.      - Therapy:   - Patient has completed 6 weeks of physician lead home formal physical therapy with the last 3 months with mild relief.  Also completed formal physical therapy in February of 2025 with minimal relief.    - Imaging/Diagnostic:  - Lumbar MRI (2025) reviewed.  - BLE EMG/NCS (12/18/2024)  reveals evidence of left acute on chronic S1 radiculopathy and mild chronic radiculopathy of the left l L5 nerve root    - Consults:   - None at this time.    - Follow up visit: 4 weeks post-procedure - in clinic (per pt request)       Future Appointments   Date Time Provider Department Center   4/23/2025 12:40 PM Meg Ma, [facility] HG INT [facility] [place]       Visit today included increased complexity associated with the care of the episodic problem of chronic pain which was addressed and continue to manage the longitudinal care of the patient due to the serious and/or complex managed problem(s) listed above.    - Patient Questions: Answered all of the patient's questions regarding diagnosis, therapy, and treatment.    - This condition does not require this  patient to take time off of work, and the primary goal of our Pain Management services is to improve the patient's functional capacity.   - I discussed the risks, benefits, and alternatives to potential treatment options. All questions and concerns were fully addressed today in clinic.         Meg Ma PA-C  Interventional Pain Management - [facility]    Disclaimer:  This note was prepared using voice recognition system and is likely to have sound alike errors that may have been overlooked even after proof reading.  Please call me with any questions.

## 2025-03-10 NOTE — PATIENT INSTRUCTIONS
______________________________________________________________________     Here are some brief explanations of conditions on MRI:    When the disc bulge is large enough and herniates out toward the spinal canal, it puts pressure on the sensitive spinal nerves, causing pain - including lumbar radiculopathy/ sciatica, a sharp, often shooting pain that extends from the buttocks down the back of one leg [or cervical radiculopathy when shooting from the neck into the arm]. Numbness, tingling, and weakness in one extremity is also common.  If the herniated disc is not pressing on a nerve, the patient may experience a neck or back ache or even no pain at all.       Spinal stenosis is a narrowing of the spaces within your spine, which can put pressure on the nerves that travel through the spine.  - central stenosis (area around the spinal cord) - can cause leg pain with walking, along with low back pain [or in terms of cervical issues: arm pain, along with neck pain]  - neural foraminal stenosis (area on the sides where nerves come out to come together to form large nerve that goes down your arm or leg) - can cause leg pain [or neck pain] as well.                   Facet joint syndrome is an arthritis-like condition of the spine that can be a significant source of back and neck pain. It is caused by degenerative changes to the joints between the spine bones. The cartilage inside the facet joint can break down and become inflamed, triggering pain signals in nearby nerve endings.

## 2025-03-24 NOTE — PRE-PROCEDURE INSTRUCTIONS
Spoke with patient regarding procedure scheduled on 3.27     Arrival time 1245     Has patient been sick with fever or on antibiotics within the last 7 days? No     Does the patient have any open wounds, sores or rashes? no     Does the patient have any recent fractures? no     Has patient received a vaccination within the last 7 days? No     Received the COVID vaccination?      Has the patient stopped all medications as directed? na      Does patient have a pacemaker, defibrillator, or implantable stimulator? No     Does the patient have a ride to and from procedure and someone reliable to remain with patient?  unsure, educated     Is the patient diabetic? no      Does the patient have sleep apnea? Or use O2 at home? no     Is the patient receiving sedation?      Is the patient instructed to remain NPO beginning at midnight the night before their procedure? yes     Procedure location confirmed with patient? Yes     Covid- Denies signs/symptoms. Instructed to notify PAT/MD if any changes.

## 2025-03-27 ENCOUNTER — HOSPITAL ENCOUNTER (OUTPATIENT)
Facility: HOSPITAL | Age: 40
Discharge: HOME | End: 2025-03-27
Attending: ANESTHESIOLOGY | Admitting: ANESTHESIOLOGY
Payer: COMMERCIAL

## 2025-03-27 VITALS
DIASTOLIC BLOOD PRESSURE: 92 MMHG | WEIGHT: 230.06 LBS | RESPIRATION RATE: 10 BRPM | OXYGEN SATURATION: 99 % | TEMPERATURE: 98 F | HEIGHT: 65 IN | BODY MASS INDEX: 38.33 KG/M2 | SYSTOLIC BLOOD PRESSURE: 154 MMHG | HEART RATE: 64 BPM

## 2025-03-27 DIAGNOSIS — M54.16 LUMBAR RADICULOPATHY: Primary | ICD-10-CM

## 2025-03-27 LAB
B-HCG UR QL: NEGATIVE
CTP QC/QA: YES

## 2025-03-27 PROCEDURE — 64484 NJX AA&/STRD TFRM EPI L/S EA: CPT | Mod: LT | Performed by: ANESTHESIOLOGY

## 2025-03-27 PROCEDURE — 64484 NJX AA&/STRD TFRM EPI L/S EA: CPT | Mod: LT,,, | Performed by: ANESTHESIOLOGY

## 2025-03-27 PROCEDURE — 64483 NJX AA&/STRD TFRM EPI L/S 1: CPT | Mod: LT | Performed by: ANESTHESIOLOGY

## 2025-03-27 PROCEDURE — 25500020 PHARM REV CODE 255: Performed by: ANESTHESIOLOGY

## 2025-03-27 PROCEDURE — 64483 NJX AA&/STRD TFRM EPI L/S 1: CPT | Mod: LT,,, | Performed by: ANESTHESIOLOGY

## 2025-03-27 PROCEDURE — 63600175 PHARM REV CODE 636 W HCPCS: Performed by: ANESTHESIOLOGY

## 2025-03-27 PROCEDURE — 81025 URINE PREGNANCY TEST: CPT | Performed by: ANESTHESIOLOGY

## 2025-03-27 RX ORDER — LIDOCAINE HYDROCHLORIDE 10 MG/ML
INJECTION, SOLUTION EPIDURAL; INFILTRATION; INTRACAUDAL; PERINEURAL
Status: DISCONTINUED | OUTPATIENT
Start: 2025-03-27 | End: 2025-03-27 | Stop reason: HOSPADM

## 2025-03-27 RX ORDER — BETAMETHASONE SODIUM PHOSPHATE AND BETAMETHASONE ACETATE 3; 3 MG/ML; MG/ML
INJECTION, SUSPENSION INTRA-ARTICULAR; INTRALESIONAL; INTRAMUSCULAR; SOFT TISSUE
Status: DISCONTINUED | OUTPATIENT
Start: 2025-03-27 | End: 2025-03-27 | Stop reason: HOSPADM

## 2025-03-27 RX ORDER — MIDAZOLAM HYDROCHLORIDE 1 MG/ML
INJECTION, SOLUTION INTRAMUSCULAR; INTRAVENOUS
Status: DISCONTINUED | OUTPATIENT
Start: 2025-03-27 | End: 2025-03-27 | Stop reason: HOSPADM

## 2025-03-27 RX ORDER — ONDANSETRON HYDROCHLORIDE 2 MG/ML
4 INJECTION, SOLUTION INTRAVENOUS ONCE AS NEEDED
Status: DISCONTINUED | OUTPATIENT
Start: 2025-03-27 | End: 2025-03-27 | Stop reason: HOSPADM

## 2025-03-27 RX ORDER — FENTANYL CITRATE 50 UG/ML
INJECTION, SOLUTION INTRAMUSCULAR; INTRAVENOUS
Status: DISCONTINUED | OUTPATIENT
Start: 2025-03-27 | End: 2025-03-27 | Stop reason: HOSPADM

## 2025-03-27 NOTE — DISCHARGE INSTRUCTIONS

## 2025-03-27 NOTE — H&P
"HPI  Patient presenting for Procedure(s) (LRB):  Left L5/S1 + S1 TF PRISCILLA (Left)     Patient on Anti-coagulation No    No health changes since previous encounter    Past Medical History:   Diagnosis Date    Asthma     childhood    History of ovarian cyst     Hypertension     Hypokalemia 2014    Leg swelling 2023    Paresthesia and pain of both upper extremities 2019     Past Surgical History:   Procedure Laterality Date    CARPAL TUNNEL RELEASE Bilateral     CERVICAL CERCLAGE N/A 09/10/2019    Procedure: CERCLAGE, CERVIX;  Surgeon: Vandana Smith MD;  Location: [facility] OR;  Service: OB/GYN;  Laterality: N/A;     SECTION       SECTION N/A 2020    Procedure:  SECTION;  Surgeon: Vandana Smith MD;  Location: [facility] L&D;  Service: OB/GYN;  Laterality: N/A;    MOUTH SURGERY      OOPHORECTOMY Right 2010     Review of patient's allergies indicates:  No Known Allergies     Medications Ordered Prior to Encounter[1]     PMHx, PSHx, Allergies, Medications reviewed in epic    ROS negative except pain complaints in HPI    OBJECTIVE:    BP (!) 179/92 (BP Location: Right arm, Patient Position: Sitting)   Pulse 73   Temp 97.7 °F (36.5 °C) (Temporal)   Resp 16   Ht 5' 5" (1.651 m)   Wt 104.3 kg (230 lb 0.8 oz)   LMP 2025   SpO2 99%   Breastfeeding No   BMI 38.28 kg/m²     PHYSICAL EXAMINATION:    GENERAL: Well appearing, in no acute distress, alert and oriented x3.  PSYCH:  Mood and affect appropriate.  SKIN: Skin color, texture, turgor normal, no rashes or lesions which will impact the procedure.  CV: RRR with palpation of the radial artery.  PULM: No evidence of respiratory difficulty, symmetric chest rise. Clear to auscultation.  NEURO: Cranial nerves grossly intact.    Plan:    Proceed with procedure as planned Procedure(s) (LRB):  Left L5/S1 + S1 TF PRISCILLA (Left)    Mac Robert MD  2025                 [1]   No current facility-administered medications on " file prior to encounter.     Current Outpatient Medications on File Prior to Encounter   Medication Sig Dispense Refill    labetaloL (NORMODYNE) 200 MG tablet TAKE 1 TABLET BY MOUTH EVERY 12 HOURS 180 tablet 0    norethindrone (MICRONOR) 0.35 mg tablet Take 1 tablet (0.35 mg total) by mouth once daily. 90 tablet 3    pregabalin (LYRICA) 100 MG capsule Take 1 capsule (100 mg total) by mouth 2 (two) times daily. 60 capsule 1    sertraline (ZOLOFT) 25 MG tablet Take 1 tablet (25 mg total) by mouth once daily. 30 tablet 11    spironolactone (ALDACTONE) 25 MG tablet Take 1 tablet (25 mg total) by mouth once daily. 30 tablet 11    meloxicam (MOBIC) 15 MG tablet Take 1 tablet by mouth once daily 30 tablet 1    potassium chloride (KLOR-CON) 10 MEQ TbSR Take 1 tablet (10 mEq total) by mouth 2 (two) times daily. 60 tablet 3

## 2025-03-27 NOTE — DISCHARGE SUMMARY
Discharge Note  Short Stay      SUMMARY     Admit Date: 3/27/2025    Attending Physician: Mac Robert MD        Discharge Physician: Mac Robert MD        Discharge Date: 3/27/2025 11:45 AM    Procedure(s) (LRB):  Left L5/S1 + S1 TF PRISCILLA (Left)    Final Diagnosis: Left lumbar radiculopathy [M54.16]    Disposition: Home or self care    Patient Instructions:   Current Discharge Medication List        CONTINUE these medications which have NOT CHANGED    Details   labetaloL (NORMODYNE) 200 MG tablet TAKE 1 TABLET BY MOUTH EVERY 12 HOURS  Qty: 180 tablet, Refills: 0    Comments: .  Associated Diagnoses: Essential hypertension      norethindrone (MICRONOR) 0.35 mg tablet Take 1 tablet (0.35 mg total) by mouth once daily.  Qty: 90 tablet, Refills: 3    Associated Diagnoses: Irregular menstrual cycle; Encounter for surveillance of contraceptive pills      pregabalin (LYRICA) 100 MG capsule Take 1 capsule (100 mg total) by mouth 2 (two) times daily.  Qty: 60 capsule, Refills: 1    Associated Diagnoses: Degeneration of intervertebral disc of lumbar region with discogenic back pain and lower extremity pain; Lumbar radiculopathy, acute; Lumbar spondylosis; Dorsalgia, unspecified; Lumbar radiculopathy, chronic      sertraline (ZOLOFT) 25 MG tablet Take 1 tablet (25 mg total) by mouth once daily.  Qty: 30 tablet, Refills: 11    Associated Diagnoses: Current mild episode of major depressive disorder without prior episode      spironolactone (ALDACTONE) 25 MG tablet Take 1 tablet (25 mg total) by mouth once daily.  Qty: 30 tablet, Refills: 11    Comments: .  Associated Diagnoses: Essential hypertension      meloxicam (MOBIC) 15 MG tablet Take 1 tablet by mouth once daily  Qty: 30 tablet, Refills: 1    Associated Diagnoses: Lumbar radiculopathy, acute      potassium chloride (KLOR-CON) 10 MEQ TbSR Take 1 tablet (10 mEq total) by mouth 2 (two) times daily.  Qty: 60 tablet, Refills: 3    Associated Diagnoses: Hypokalemia                  Discharge Diagnosis: Left lumbar radiculopathy [M54.16]  Condition on Discharge: Stable with no complications to procedure   Diet on Discharge: Same as before.  Activity: as per instruction sheet.  Discharge to: Home with a responsible adult.  Follow up: 2-4 weeks       Please call the office at [phone] if you experience any weakness or loss of sensation, fever > 101.5, pain uncontrolled with oral medications, persistent nausea/vomiting/or diarrhea, redness or drainage from the incisions, or any other worrisome concerns. If physician on call was not reached or could not communicate with our office for any reason please go to the nearest emergency department

## 2025-03-27 NOTE — OP NOTE
Transforaminal Lumbar Epidural Steroid Injection    INFORMED CONSENT: The procedure, risks, benefits and options were discussed with patient. There are no contraindications to the procedure. The patient expressed understanding and agreed to proceed. The personnel performing the procedure was discussed.    Date of procedure 03/27/2025    Time-out taken to identify patient and procedure side prior to starting the procedure.                     PROCEDURE:    1)  Left  L4/L5 TRANSFORAMINAL EPIDURAL STEROID INJECTION    2)  Left  L5/S1 TRANSFORAMINAL EPIDURAL STEROID INJECTION    Pre Procedure diagnosis:    Left lumbar radiculopathy [M54.16]  1. Lumbar radiculopathy        Post-Procedure diagnosis:   same    Surgeon: Mac Robert MD    Assistants: None    Medication: 2ml Betamethasone PF 6mg/ml and 2ml Lidocaine PF 1%    Local: 3ml Lidocaine PF 1%    Sedation: Conscious sedation provided by M.D    SEDATION MEDICATIONS: local/IV sedation: Versed 2 mg and fentanyl 75 mcg IV.  Conscious sedation ordered by MD.  Patient reevaluated and sedation administered by MD and monitored by RN.  Total sedation time was less than 10 min.    Total sedation time was <10 min    Complications: None    Specimens: None        TECHNIQUE: The patient was brought to the procedure room. IV access was obtained prior to the procedure. The patient was positioned prone on the fluoroscopy table. Continuous hemodynamic monitoring was initiated including blood pressure, EKG, and pulse oximetry. . The skin was prepped with chlorhexidine and draped in a sterile fashion.   Local Xylocaine was injected by raising a wheel and going down to the periosteum using a 27-gauge hypodermic needle.      The Left  L4/L5 and Left L5/S1 transforaminal spaces were identified with fluoroscopy in the  AP, oblique, and lateral views.  A 22 gauge spinal quinke needle was then advanced into the area of the trans foraminal spaces at the above levels with confirmation of  proper needle position using AP, oblique, and lateral fluoroscopic views. Once the needle tip was in the area of the transforaminal space, and there was no blood, CSF or paraesthesias,  2 mL of Omnipaque 300mg/ml was injected at each level for a total of 4 mL.  Fluoroscopic imaging in the AP and lateral views revealed a clear outline of the spinal nerve with proximal spread of agent through the neural foramen into the epidural space. A total combination of 1 mL of Lidocaine PF 1% and 1ml of Betamethasone PF 6mg/ml was injected into each level for a total of 4mL of injected medications with displacement of the contrast dye confirming that the medication went into the area of the transforaminal spaces at each level. A sterile dressing was applied. Patient tolerated procedure well.        The patient was monitored for approximately 30 minutes after the procedure.  Patient was given post procedure and discharge instructions to follow at home.  The patient was discharged in a stable condition

## 2025-03-31 ENCOUNTER — DOCUMENTATION ONLY (OUTPATIENT)
Dept: REHABILITATION | Facility: HOSPITAL | Age: 40
End: 2025-03-31
Payer: COMMERCIAL

## 2025-03-31 NOTE — PROGRESS NOTES
[facility]  Physical Therapy Discharge Note    Name: Malgorzata Yoder  [facility] Number: [id number]    Therapy Diagnosis:        Encounter Diagnoses   Name Primary?    Decreased strength of lower extremity Yes    Decreased functional mobility and endurance        Physician: Eliz De La Fuente*     Physician Orders: Physical Therapy Evaluation and Treat  Medical Diagnosis from Referral: M54.16 (ICD-10-CM) - Lumbar radiculopathy, acute   Evaluation Date: 12/4/2024      Date of Last visit: 2/18/2025  Total Visits Received: 10    ASSESSMENT      See daily note    Discharge reason: Patient has not attended therapy since 2/18/2025    Discharge FOTO Score: see daily note    Goals: see daily note    PLAN   This patient is discharged from Physical Therapy      Geovanna Watson, PT

## 2025-04-23 ENCOUNTER — OFFICE VISIT (OUTPATIENT)
Dept: PAIN MEDICINE | Facility: CLINIC | Age: 40
End: 2025-04-23
Payer: COMMERCIAL

## 2025-04-23 VITALS
WEIGHT: 225.75 LBS | SYSTOLIC BLOOD PRESSURE: 146 MMHG | HEART RATE: 75 BPM | BODY MASS INDEX: 37.61 KG/M2 | DIASTOLIC BLOOD PRESSURE: 94 MMHG | HEIGHT: 65 IN

## 2025-04-23 DIAGNOSIS — M51.362 DEGENERATION OF INTERVERTEBRAL DISC OF LUMBAR REGION WITH DISCOGENIC BACK PAIN AND LOWER EXTREMITY PAIN: ICD-10-CM

## 2025-04-23 DIAGNOSIS — M47.816 LUMBAR SPONDYLOSIS: ICD-10-CM

## 2025-04-23 DIAGNOSIS — M54.16 LEFT LUMBAR RADICULOPATHY: Primary | ICD-10-CM

## 2025-04-23 DIAGNOSIS — M48.061 DEGENERATIVE LUMBAR SPINAL STENOSIS: ICD-10-CM

## 2025-04-23 PROCEDURE — 3008F BODY MASS INDEX DOCD: CPT | Mod: CPTII,S$GLB,, | Performed by: PHYSICIAN ASSISTANT

## 2025-04-23 PROCEDURE — 99214 OFFICE O/P EST MOD 30 MIN: CPT | Mod: S$GLB,,, | Performed by: PHYSICIAN ASSISTANT

## 2025-04-23 PROCEDURE — 1159F MED LIST DOCD IN RCRD: CPT | Mod: CPTII,S$GLB,, | Performed by: PHYSICIAN ASSISTANT

## 2025-04-23 PROCEDURE — 1160F RVW MEDS BY RX/DR IN RCRD: CPT | Mod: CPTII,S$GLB,, | Performed by: PHYSICIAN ASSISTANT

## 2025-04-23 PROCEDURE — 99999 PR PBB SHADOW E&M-EST. PATIENT-LVL III: CPT | Mod: PBBFAC,,, | Performed by: PHYSICIAN ASSISTANT

## 2025-04-23 PROCEDURE — 3077F SYST BP >= 140 MM HG: CPT | Mod: CPTII,S$GLB,, | Performed by: PHYSICIAN ASSISTANT

## 2025-04-23 PROCEDURE — 3080F DIAST BP >= 90 MM HG: CPT | Mod: CPTII,S$GLB,, | Performed by: PHYSICIAN ASSISTANT

## 2025-04-23 PROCEDURE — 3044F HG A1C LEVEL LT 7.0%: CPT | Mod: CPTII,S$GLB,, | Performed by: PHYSICIAN ASSISTANT

## 2025-04-23 NOTE — PROGRESS NOTES
Chronic Pain -- Established Patient (Follow-up visit)    Referring Physician: Eliz De La Fuente*    PCP: Sofia Abdul MD      Chief complaint:  Back Pain (Throbbing lower back pain)            SUBJECTIVE:    Interval History (4/23/2025): Malgorzata Yoder presents today for follow-up after pain management injection.  she underwent left L5/S1 + S1 TF PRISCILLA on 3/27/25.  The patient reports that she is/was better following the procedure.  she reports 100% pain relief.  The changes lasted 4 weeks so far.  The changes have continued through this visit.  Patient reports pain as 5/10 today.    Patient reports that her previous injection has been helpful. Her work performance has been good. She is currently taking Lyrica 50mg BID; she does have the Rx for bottle of 100mg tablets but has not started taking it. She plans to finish the 50mg dose before switching to the 100mg tablets. She also takes meloxicam daily, prescribed by Dr. Abdul, which she takes regularly.  She has a 5-year-old son.    Interval History (3/10/2025):  Patient presents today for follow-up visit to review lumbar MRI.  Since the last visit, she has completed physical therapy.  She found minimal relief, although it did help with core strengthening and overall strengthening.  She continues to have pain in the low back radiating down the left leg.  She feels a pins and needles sensation.   Patient reports pain as 8/10 today.  She continues to take Lyrica 50 mg twice a day, which does not cause drowsiness, but overall does not seem to be providing adequate pain relief.    Initial HPI (2/11/2025):  Malgorzata Yoder is a 39 y.o. female who presents to the clinic for the evaluation of lower back pain.   Patient reports 12 year history of waxing and waning lower back pain that has recently increased 2 months ago.  Patient reports having episode of significantly increased lower back pain at work in 2013.  Patient denies any previous surgeries in  her lumbar spine or bilateral lower extremities.    Lower back pain described as an aching burning pain that starts in the left side of her lower back.  This pain then radiates down her left lower extremity along the posterior and lateral aspect to the lateral aspect of the left foot.  Patient denies any significant right-sided pain.  Pain is worse with prolonged standing and lifting, better with lying supine and rest.  Pain is currently rated an 8/10.  Patient denies any fevers, chills, changes in gait, saddle anesthesia, or bowel and bladder incontinence.      Non-Pharmacologic Treatments:  Physical Therapy/Home Exercise: yes  Ice/Heat:yes  TENS: no  Acupuncture: no  Massage: yes  Chiropractic: no        Previous Pain Medications:  Tylenol, ibuprofen, meloxicam, Flexeril, topicals      Pain Procedures:   -3/27/25: left L5/S1 + S1 TF PRISCILLA, 100% pain relief             Pain Disability Index (PDI) Score Review:      2/11/2025     9:10 AM   Last 3 PDI Scores   Pain Disability Index (PDI) 52           Imaging/ Diagnostic Studies/ Labs (Reviewed on 4/23/2025):    2/22/25 MRI Lumbar Spine Without Contrast  COMPARISON: X-ray November 12, 2024    FINDINGS:  The distal cord and conus reveal normal signal and morphology.    Lumbar vertebra are normal in alignment.  There are prominent type 2 endplate signal changes at L5-S1.    T12-L1: Unremarkable.    L1-2:     Unremarkable.    L2-3:     Minor hypertrophic facet arthrosis.    L3-4:     Mild disc degeneration with disc desiccation disc bulge and small broad-based central disc protrusion contributing to ventral sac impression.  Mild hypertrophic facet arthrosis with mild central stenosis.    L4-5:     Mild disc degeneration with disc desiccation and generalized annular bulge.  Mild to moderate hypertrophic facet arthrosis left greater than right.  Mild central and minor right foraminal stenosis.    L5-S1:    Moderate disc degeneration with disc bulge and osteophyte.  Focal  "left lateral recess disc protrusion best seen on axial image 36 of series 6 with left lateral recess stenosis and probable left S1 nerve root impingement.  Mild facet arthrosis with mild central stenosis.  Mild left foraminal stenosis.  Impression:   Focal left L5-S1 lateral recess disc protrusion.    Central L3-4 disc protrusion.    L3-4 through L5-S1 degenerative disc disease as above.        11/12/24 X-Ray Lumbar Spine AP And Lateral  COMPARISON: 11/10/2015    FINDINGS:  Vertebral body heights maintained.  No spondylolisthesis.  L5-S1 degenerative disc height loss.  Multilevel facet arthropathy.  No acute abnormality.    Impression  Degenerative changes        BLE EMG/NCS (12/18/2024)  left acute on chronic S1 radiculopathy and mild chronic radiculopathy of the left l L5 nerve root            Review of Systems:   Constitutional:  Negative for chills, diaphoresis, fatigue and fever.   Respiratory:  Negative for chest tightness, shortness of breath, wheezing and stridor.    Cardiovascular:  Negative for chest pain and leg swelling.   Gastrointestinal:  Negative for blood in stool, diarrhea, nausea and vomiting.   Endocrine: Negative for cold intolerance and heat intolerance.   Genitourinary:  Negative for dysuria, hematuria and urgency.   Musculoskeletal:  Positive for arthralgias, back pain and myalgias. Negative for gait problem, joint swelling, neck pain and neck stiffness.   Skin:  Negative for rash.   Neurological:  Positive for weakness and numbness. Negative for tremors, seizures, speech difficulty, light-headedness and headaches.   Hematological:  Does not bruise/bleed easily.   Psychiatric/Behavioral:  Negative for agitation, confusion and suicidal ideas.             OBJECTIVE:    Physical Exam  Vitals:    04/23/25 1212   BP: (!) 146/94   Pulse: 75   Weight: 102.4 kg (225 lb 12 oz)   Height: 5' 5" (1.651 m)   PainSc:   5   PainLoc: Back      Body mass index is 37.57 kg/m².   (reviewed on " 4/23/2025)    Constitutional:       Appearance: Normal appearance.   HENT:      Head: Normocephalic and atraumatic.   Eyes:      Extraocular Movements: Extraocular movements intact.   Pulmonary:      Effort: Pulmonary effort is normal.   Skin:     General: Skin is warm.   Neurological:      Mental Status: She is alert and oriented to person, place, and time.      Sensory: No sensory deficit.      Motor: Weakness present. No abnormal muscle tone.      Gait: Gait normal.      Deep Tendon Reflexes: Babinski sign absent on the right side. Babinski sign absent on the left side.      Reflex Scores:       Patellar reflexes are 2+ on the right side and 2+ on the left side.       Achilles reflexes are 2+ on the right side and 1+ on the left side.     Comments: 4/5 strength in left dorsiflexion and left plantar flexion   Psychiatric:         Mood and Affect: Mood normal.         Behavior: Behavior normal.         Thought Content: Thought content normal.       Musculoskeletal: Lumbar Exam:  Incision: no  Pain with Flexion: Present, mild, improved since procedure   Pain with Extension: Present, mild, improved since procedure   ROM improved since PRISCILLA   Paraspinous TTP:  Positive on left  Facet TTP:  L5-S1  Facet Loading:  Positive on the left - improved   SLR:  Positive on the left  SIJ TTP:  Negative bilaterally  JEREMI:  Negative bilaterally                  ASSESSMENT:     40 y.o. year old female with lower back pain, consistent with     1. Left lumbar radiculopathy        2. Degenerative lumbar spinal stenosis        3. Lumbar spondylosis        4. Degeneration of intervertebral disc of lumbar region with discogenic back pain and lower extremity pain          Left lumbar radiculopathy    Degenerative lumbar spinal stenosis    Lumbar spondylosis    Degeneration of intervertebral disc of lumbar region with discogenic back pain and lower extremity pain          PLAN:   - Interventions:   - S/p left L5/S1 + S1 TF PRISCILLA on 3/27/25  with 100% pain relief.    - Anticoagulation use:   No no anticoagulation    - Medications:  - Refill Lyrica (pregabalin) 100mg BID; currently taking 50mg BID. Can continue whichever dose she feels more comfortable with.   - Continue meloxicam 15mg QD PRN - from PCP.     - LA  reviewed and appropriate.      - Therapy:   - Patient has completed 6 weeks of physician lead home formal physical therapy with the last 3 months with mild relief.  Also completed formal physical therapy in February of 2025 with minimal relief.    - Diagnostic/ Imaging: No new imaging ordered. Previous imaging reviewed: Lumbar MRI (2025), BLE EMG/NCS (12/18/2024): reveals evidence of left acute on chronic S1 radiculopathy and mild chronic radiculopathy of the left l L5 nerve root    - Consults:   - None at this time.    - Follow up visit: 3 months follow-up  - in clinic (per pt request)       Future Appointments   Date Time Provider Department Center   7/16/2025  8:40 AM Meg Ma PA-C [facility]       - Patient Questions: Answered all of the patient's questions regarding diagnosis, therapy, and treatment.    - This condition does not require this patient to take time off of work, and the primary goal of our Pain Management services is to improve the patient's functional capacity.   - I discussed the risks, benefits, and alternatives to potential treatment options. All questions and concerns were fully addressed today in clinic.         Meg Ma PA-C  Interventional Pain Management - [facility]    Disclaimer:  This note was prepared using voice recognition system and is likely to have sound alike errors that may have been overlooked even after proof reading.  Please call me with any questions.     This note was generated with the assistance of ambient listening technology. Verbal consent was obtained by the patient and accompanying visitor(s) for the recording of patient appointment to facilitate  this note. I attest to having reviewed and edited the generated note for accuracy, though some syntax or spelling errors may persist. Please contact the author of this note for any clarification.

## 2025-05-30 DIAGNOSIS — E87.6 HYPOKALEMIA: ICD-10-CM

## 2025-05-30 RX ORDER — POTASSIUM CHLORIDE 750 MG/1
10 TABLET, EXTENDED RELEASE ORAL 2 TIMES DAILY
Qty: 180 TABLET | Refills: 2 | Status: SHIPPED | OUTPATIENT
Start: 2025-05-30

## 2025-05-30 NOTE — TELEPHONE ENCOUNTER
No care due was identified.  [facility] Embedded Care Due Messages. Reference number: [id number].   5/30/2025 12:16:23 PM CDT

## 2025-05-30 NOTE — TELEPHONE ENCOUNTER
Refill Decision Note   Yonasia Paresh  is requesting a refill authorization.  Brief Assessment and Rationale for Refill:  Approve     Medication Therapy Plan:        Comments:     Note composed:12:18 PM 05/30/2025

## 2025-07-15 NOTE — PROGRESS NOTES
Chronic Pain -- Established Patient (Follow-up visit)    Referring Physician: Eliz De La Fuente*    PCP: Sofia Abdul MD      Chief complaint:  Follow-up     Lower back pain with radiation down the left leg        SUBJECTIVE:    Interval History (7/16/2025): Patient presents for follow-up regarding chronic pain management, last seen on 4/23/2025. She reports ongoing left leg pain radiating down from her back, accompanied by spasms. Pain is described as severe. She received an injection in March, which provided relief but has since worn off, with pain returning to its previous level. She has been prescribed Lyrica (pregabalin) 100mg twice daily and meloxicam as needed. She reports Lyrica is helping somewhat but indicates it may not be at a high enough dose as she is still having radicular pain. Upon further questioning, it was revealed that she may not have been taking Lyrica regularly, with the last Rx fill being in March. She confirms recognizing the medication when described as the capsule. She mentions needing a refill on one of her medications.  Patient reports pain as 6/10 today.    Interval History (4/23/2025): Malgorzata Yoder presents today for follow-up after pain management injection.  she underwent left L5/S1 + S1 TF PRISCILLA on 3/27/25.  The patient reports that she is/was better following the procedure.  she reports 100% pain relief.  The changes lasted 4 weeks so far.  The changes have continued through this visit.  Patient reports pain as 5/10 today.    Patient reports that her previous injection has been helpful. Her work performance has been good. She is currently taking Lyrica 50mg BID; she does have the Rx for bottle of 100mg tablets but has not started taking it. She plans to finish the 50mg dose before switching to the 100mg tablets. She also takes meloxicam daily, prescribed by Dr. Abdul, which she takes regularly.  She has a 5-year-old son.    Interval History (3/10/2025):  Patient  presents today for follow-up visit to review lumbar MRI.  Since the last visit, she has completed physical therapy.  She found minimal relief, although it did help with core strengthening and overall strengthening.  She continues to have pain in the low back radiating down the left leg.  She feels a pins and needles sensation.   Patient reports pain as 8/10 today.  She continues to take Lyrica 50 mg twice a day, which does not cause drowsiness, but overall does not seem to be providing adequate pain relief.    Initial HPI (2/11/2025):  Malgorzata Yoder is a 39 y.o. female who presents to the clinic for the evaluation of lower back pain.   Patient reports 12 year history of waxing and waning lower back pain that has recently increased 2 months ago.  Patient reports having episode of significantly increased lower back pain at work in 2013.  Patient denies any previous surgeries in her lumbar spine or bilateral lower extremities.    Lower back pain described as an aching burning pain that starts in the left side of her lower back.  This pain then radiates down her left lower extremity along the posterior and lateral aspect to the lateral aspect of the left foot.  Patient denies any significant right-sided pain.  Pain is worse with prolonged standing and lifting, better with lying supine and rest.  Pain is currently rated an 8/10.  Patient denies any fevers, chills, changes in gait, saddle anesthesia, or bowel and bladder incontinence.      Non-Pharmacologic Treatments:  Physical Therapy/Home Exercise: yes  Ice/Heat:yes  TENS: no  Acupuncture: no  Massage: yes  Chiropractic: no        Previous Pain Medications:  Tylenol, ibuprofen, meloxicam, Flexeril, topicals      Pain Procedures:   -3/27/25: left L5/S1 + S1 TF PRISCILLA, 100% pain relief             Pain Disability Index (PDI) Score Review:      7/16/2025     8:40 AM 2/11/2025     9:10 AM   Last 3 PDI Scores   Pain Disability Index (PDI) 42 52           Imaging/  Diagnostic Studies/ Labs (Reviewed on 7/17/2025):    2/22/25 MRI Lumbar Spine Without Contrast  COMPARISON: X-ray November 12, 2024    FINDINGS:  The distal cord and conus reveal normal signal and morphology.    Lumbar vertebra are normal in alignment.  There are prominent type 2 endplate signal changes at L5-S1.    T12-L1: Unremarkable.    L1-2:     Unremarkable.    L2-3:     Minor hypertrophic facet arthrosis.    L3-4:     Mild disc degeneration with disc desiccation disc bulge and small broad-based central disc protrusion contributing to ventral sac impression.  Mild hypertrophic facet arthrosis with mild central stenosis.    L4-5:     Mild disc degeneration with disc desiccation and generalized annular bulge.  Mild to moderate hypertrophic facet arthrosis left greater than right.  Mild central and minor right foraminal stenosis.    L5-S1:    Moderate disc degeneration with disc bulge and osteophyte.  Focal left lateral recess disc protrusion best seen on axial image 36 of series 6 with left lateral recess stenosis and probable left S1 nerve root impingement.  Mild facet arthrosis with mild central stenosis.  Mild left foraminal stenosis.  Impression:   Focal left L5-S1 lateral recess disc protrusion.    Central L3-4 disc protrusion.    L3-4 through L5-S1 degenerative disc disease as above.        11/12/24 X-Ray Lumbar Spine AP And Lateral  COMPARISON: 11/10/2015    FINDINGS:  Vertebral body heights maintained.  No spondylolisthesis.  L5-S1 degenerative disc height loss.  Multilevel facet arthropathy.  No acute abnormality.    Impression  Degenerative changes        BLE EMG/NCS (12/18/2024)  left acute on chronic S1 radiculopathy and mild chronic radiculopathy of the left l L5 nerve root            Review of Systems:   Constitutional:  Negative for chills, diaphoresis, fatigue and fever.   Respiratory:  Negative for chest tightness, shortness of breath, wheezing and stridor.    Cardiovascular:  Negative for chest  "pain and leg swelling.   Gastrointestinal:  Negative for blood in stool, diarrhea, nausea and vomiting.   Endocrine: Negative for cold intolerance and heat intolerance.   Genitourinary:  Negative for dysuria, hematuria and urgency.   Musculoskeletal:  Positive for arthralgias, back pain and myalgias. Negative for gait problem, joint swelling, neck pain and neck stiffness.   Skin:  Negative for rash.   Neurological:  Positive for weakness and numbness. Negative for tremors, seizures, speech difficulty, light-headedness and headaches.   Hematological:  Does not bruise/bleed easily.   Psychiatric/Behavioral:  Negative for agitation, confusion and suicidal ideas.             OBJECTIVE:    Physical Exam  Vitals:    07/16/25 0838   BP: (!) 142/86   Pulse: 96   Resp: 17   Weight: 103 kg (227 lb 1.2 oz)   Height: 5' 5" (1.651 m)   PainSc:   6   PainLoc: Back      Body mass index is 37.79 kg/m².   (reviewed on 7/17/2025)    Constitutional:       Appearance: Normal appearance.   HENT:      Head: Normocephalic and atraumatic.   Eyes:      Extraocular Movements: Extraocular movements intact.   Pulmonary:      Effort: Pulmonary effort is normal.   Skin:     General: Skin is warm.   GI:       no obvious distention.   Neurological:      Mental Status: She is alert and oriented to person, place, and time.      Sensory: No sensory deficit.      Motor: Weakness present. No abnormal muscle tone.      Gait: Gait normal.      Deep Tendon Reflexes: Babinski sign absent on the right side. Babinski sign absent on the left side.      Reflex Scores:       Patellar reflexes are 2+ on the right side and 2+ on the left side.       Achilles reflexes are 2+ on the right side and 1+ on the left side.     Comments: 4/5 strength in left dorsiflexion and left plantar flexion   Psychiatric:         Mood and Affect: Mood normal.         Behavior: Behavior normal.         Thought Content: Thought content normal.       Musculoskeletal: Lumbar " Exam:  Incision: no  Pain with Flexion: Present  Pain with Extension: Present  Limited ROM secondary to pain reproduction   Paraspinous TTP:  Positive on left  Facet TTP:  L5-S1  Facet Loading:  Positive on the left  SLR:  Positive on the left  SIJ TTP:  Negative bilaterally  JEREMI:  Negative bilaterally                  ASSESSMENT:     40 y.o. year old female with lower back pain, consistent with     1. Degenerative lumbar spinal stenosis        2. Left lumbosacral radiculopathy  meloxicam (MOBIC) 15 MG tablet    Case Request-RAD/Other Procedure Area: left L5/S1 + S1 TF PRISCILLA      3. Degeneration of intervertebral disc of lumbar region with discogenic back pain and lower extremity pain  pregabalin (LYRICA) 100 MG capsule      4. Lumbar radiculopathy, acute        5. Lumbar spondylosis        6. Dorsalgia, unspecified        7. Lumbar radiculopathy, chronic        8. Acute exacerbation of chronic low back pain  ketorolac injection 30 mg          Degenerative lumbar spinal stenosis    Left lumbosacral radiculopathy  -     meloxicam (MOBIC) 15 MG tablet; Take 1 tablet (15 mg total) by mouth daily as needed for Pain. Take with food.  Avoid other OTC NSAIDs (ex. Ibuprofen, naproxen) while taking this medication.  Dispense: 30 tablet; Refill: 0  -     Case Request-RAD/Other Procedure Area: left L5/S1 + S1 TF PRISCILLA    Degeneration of intervertebral disc of lumbar region with discogenic back pain and lower extremity pain  -     pregabalin (LYRICA) 100 MG capsule; Take 1 capsule (100 mg total) by mouth 2 (two) times daily.  Dispense: 60 capsule; Refill: 1    Lumbar radiculopathy, acute    Lumbar spondylosis    Dorsalgia, unspecified    Lumbar radiculopathy, chronic    Acute exacerbation of chronic low back pain  -     ketorolac injection 30 mg          PLAN:   - Interventions:   - S/p left L5/S1 + S1 TF PRISCILLA on 3/27/25 with 100% pain relief.    - Anticoagulation use:   No no anticoagulation    - Medications:  - Procedure note:  An IM injection of (ketolorac 30mg/1mL) was administered during clinic visit by our medical assistant.  This was well tolerated.  - Restart Lyrica (pregabalin) 100mg BID.  She has not been taking Pregabalin regularly, with the last fill in March. Consider increasing once taking regularly to: Lyrica (pregabalin) 150 mg twice daily.  - Refill meloxicam 15mg QD PRN - originally from PCP.     - LA  reviewed and appropriate.      - Therapy:   - Patient has completed 6 weeks of physician lead home formal physical therapy with mild relief.  Also completed formal physical therapy in February of 2025 with minimal relief.    - Diagnostic/ Imaging: No new imaging ordered. Previous imaging reviewed: Lumbar MRI (2025), BLE EMG/NCS (12/18/2024): reveals evidence of left acute on chronic S1 radiculopathy and mild chronic radiculopathy of the left l L5 nerve root    - Consults:   - None at this time.    - Follow up visit: 4 weeks post-procedure - in clinic (per pt request)       Future Appointments   Date Time Provider Department Center   8/26/2025 10:00 AM Meg Ma PA-C [facility]         - Patient Questions: Answered all of the patient's questions regarding diagnosis, therapy, and treatment.    - This condition does not require this patient to take time off of work, and the primary goal of our Pain Management services is to improve the patient's functional capacity.   - I discussed the risks, benefits, and alternatives to potential treatment options. All questions and concerns were fully addressed today in clinic.         Meg Ma PA-C  Interventional Pain Management - [facility]    Disclaimer:  This note was prepared using voice recognition system and is likely to have sound alike errors that may have been overlooked even after proof reading.  Please call me with any questions.     This note was generated with the assistance of ambient listening technology to support clinical  documentation. The content has been reviewed and edited for accuracy by the author, though minor syntax or spelling errors may remain. Please contact the author of this note for any clarification.

## 2025-07-16 ENCOUNTER — OFFICE VISIT (OUTPATIENT)
Dept: PAIN MEDICINE | Facility: CLINIC | Age: 40
End: 2025-07-16
Payer: COMMERCIAL

## 2025-07-16 VITALS
BODY MASS INDEX: 37.83 KG/M2 | RESPIRATION RATE: 17 BRPM | HEART RATE: 96 BPM | WEIGHT: 227.06 LBS | DIASTOLIC BLOOD PRESSURE: 86 MMHG | SYSTOLIC BLOOD PRESSURE: 142 MMHG | HEIGHT: 65 IN

## 2025-07-16 DIAGNOSIS — M54.17 LEFT LUMBOSACRAL RADICULOPATHY: ICD-10-CM

## 2025-07-16 DIAGNOSIS — M54.50 ACUTE EXACERBATION OF CHRONIC LOW BACK PAIN: ICD-10-CM

## 2025-07-16 DIAGNOSIS — M54.16 LUMBAR RADICULOPATHY, ACUTE: ICD-10-CM

## 2025-07-16 DIAGNOSIS — M48.061 DEGENERATIVE LUMBAR SPINAL STENOSIS: Primary | ICD-10-CM

## 2025-07-16 DIAGNOSIS — M51.362 DEGENERATION OF INTERVERTEBRAL DISC OF LUMBAR REGION WITH DISCOGENIC BACK PAIN AND LOWER EXTREMITY PAIN: ICD-10-CM

## 2025-07-16 DIAGNOSIS — M54.16 LUMBAR RADICULOPATHY, CHRONIC: ICD-10-CM

## 2025-07-16 DIAGNOSIS — M54.9 DORSALGIA, UNSPECIFIED: ICD-10-CM

## 2025-07-16 DIAGNOSIS — G89.29 ACUTE EXACERBATION OF CHRONIC LOW BACK PAIN: ICD-10-CM

## 2025-07-16 DIAGNOSIS — M47.816 LUMBAR SPONDYLOSIS: ICD-10-CM

## 2025-07-16 PROCEDURE — 99999 PR PBB SHADOW E&M-EST. PATIENT-LVL III: CPT | Mod: PBBFAC,,, | Performed by: PHYSICIAN ASSISTANT

## 2025-07-16 RX ORDER — MELOXICAM 15 MG/1
15 TABLET ORAL DAILY PRN
Qty: 30 TABLET | Refills: 0 | Status: SHIPPED | OUTPATIENT
Start: 2025-07-16

## 2025-07-16 RX ORDER — PREGABALIN 100 MG/1
100 CAPSULE ORAL 2 TIMES DAILY
Qty: 60 CAPSULE | Refills: 1 | Status: SHIPPED | OUTPATIENT
Start: 2025-07-16

## 2025-07-16 RX ORDER — KETOROLAC TROMETHAMINE 30 MG/ML
30 INJECTION, SOLUTION INTRAMUSCULAR; INTRAVENOUS
Status: COMPLETED | OUTPATIENT
Start: 2025-07-16 | End: 2025-07-16

## 2025-07-16 RX ADMIN — KETOROLAC TROMETHAMINE 30 MG: 30 INJECTION, SOLUTION INTRAMUSCULAR; INTRAVENOUS at 09:07

## 2025-07-18 ENCOUNTER — PATIENT MESSAGE (OUTPATIENT)
Dept: PAIN MEDICINE | Facility: HOSPITAL | Age: 40
End: 2025-07-18
Payer: COMMERCIAL

## 2025-08-14 ENCOUNTER — HOSPITAL ENCOUNTER (OUTPATIENT)
Facility: HOSPITAL | Age: 40
Discharge: HOME | End: 2025-08-14
Attending: PHYSICAL MEDICINE & REHABILITATION | Admitting: PHYSICAL MEDICINE & REHABILITATION
Payer: COMMERCIAL

## 2025-08-14 VITALS
SYSTOLIC BLOOD PRESSURE: 156 MMHG | TEMPERATURE: 98 F | HEIGHT: 65 IN | DIASTOLIC BLOOD PRESSURE: 91 MMHG | OXYGEN SATURATION: 99 % | BODY MASS INDEX: 38.12 KG/M2 | WEIGHT: 228.81 LBS | RESPIRATION RATE: 17 BRPM | HEART RATE: 78 BPM

## 2025-08-14 DIAGNOSIS — G89.4 CHRONIC PAIN SYNDROME: ICD-10-CM

## 2025-08-14 DIAGNOSIS — M54.16 LUMBAR RADICULOPATHY, ACUTE: Primary | ICD-10-CM

## 2025-08-14 LAB
B-HCG UR QL: NEGATIVE
CTP QC/QA: YES

## 2025-08-14 PROCEDURE — 25500020 PHARM REV CODE 255: Performed by: PHYSICAL MEDICINE & REHABILITATION

## 2025-08-14 PROCEDURE — 63600175 PHARM REV CODE 636 W HCPCS: Mod: JZ,TB | Performed by: PHYSICAL MEDICINE & REHABILITATION

## 2025-08-14 PROCEDURE — 64483 NJX AA&/STRD TFRM EPI L/S 1: CPT | Mod: LT | Performed by: PHYSICAL MEDICINE & REHABILITATION

## 2025-08-14 PROCEDURE — 64484 NJX AA&/STRD TFRM EPI L/S EA: CPT | Mod: LT | Performed by: PHYSICAL MEDICINE & REHABILITATION

## 2025-08-14 PROCEDURE — 81025 URINE PREGNANCY TEST: CPT | Performed by: PHYSICAL MEDICINE & REHABILITATION

## 2025-08-14 PROCEDURE — 64483 NJX AA&/STRD TFRM EPI L/S 1: CPT | Mod: LT,,, | Performed by: PHYSICAL MEDICINE & REHABILITATION

## 2025-08-14 PROCEDURE — 64484 NJX AA&/STRD TFRM EPI L/S EA: CPT | Mod: LT,,, | Performed by: PHYSICAL MEDICINE & REHABILITATION

## 2025-08-14 RX ORDER — METHYLPREDNISOLONE ACETATE 40 MG/ML
INJECTION, SUSPENSION INTRA-ARTICULAR; INTRALESIONAL; INTRAMUSCULAR; SOFT TISSUE
Status: DISCONTINUED | OUTPATIENT
Start: 2025-08-14 | End: 2025-08-14 | Stop reason: HOSPADM

## 2025-08-14 RX ORDER — MIDAZOLAM HYDROCHLORIDE 1 MG/ML
INJECTION, SOLUTION INTRAMUSCULAR; INTRAVENOUS
Status: DISCONTINUED | OUTPATIENT
Start: 2025-08-14 | End: 2025-08-14 | Stop reason: HOSPADM

## 2025-08-14 RX ORDER — ONDANSETRON HYDROCHLORIDE 2 MG/ML
4 INJECTION, SOLUTION INTRAVENOUS ONCE AS NEEDED
Status: DISCONTINUED | OUTPATIENT
Start: 2025-08-14 | End: 2025-08-14 | Stop reason: HOSPADM

## 2025-08-14 RX ORDER — BUPIVACAINE HYDROCHLORIDE 2.5 MG/ML
INJECTION, SOLUTION EPIDURAL; INFILTRATION; INTRACAUDAL; PERINEURAL
Status: DISCONTINUED | OUTPATIENT
Start: 2025-08-14 | End: 2025-08-14 | Stop reason: HOSPADM

## 2025-08-14 RX ORDER — FENTANYL CITRATE 50 UG/ML
INJECTION, SOLUTION INTRAMUSCULAR; INTRAVENOUS
Status: DISCONTINUED | OUTPATIENT
Start: 2025-08-14 | End: 2025-08-14 | Stop reason: HOSPADM

## 2025-08-25 ENCOUNTER — TELEPHONE (OUTPATIENT)
Dept: PAIN MEDICINE | Facility: CLINIC | Age: 40
End: 2025-08-25
Payer: COMMERCIAL

## (undated) DEVICE — SYR 10CC LUER LOCK

## (undated) DEVICE — CONTAINER SPECIMEN STRL 4OZ

## (undated) DEVICE — MANIFOLD 4 PORT

## (undated) DEVICE — CATH 16FR URETHRL RED RUB

## (undated) DEVICE — SEE MEDLINE ITEM 152622

## (undated) DEVICE — PAD ABD 8X10 STERILE

## (undated) DEVICE — SUT 2/0 18IN SILK BLK BRAID

## (undated) DEVICE — SEE MEDLINE ITEM 157027

## (undated) DEVICE — DRESSING AQUACEL ADH 4X10IN

## (undated) DEVICE — SOL NS 1000CC

## (undated) DEVICE — PACK DRAPE PERI/GYN TIBURON

## (undated) DEVICE — COVER OVERHEAD SURG LT BLUE

## (undated) DEVICE — SEE MEDLINE ITEM 152739

## (undated) DEVICE — SUT MERSILENE 5-0 RS22

## (undated) DEVICE — GLOVE SURG BIOGEL LATEX SZ 7.5

## (undated) DEVICE — DRESSING HYPAFIX 2X10YDS

## (undated) DEVICE — SEE MEDLINE ITEM 157181

## (undated) DEVICE — SEE L#152161